# Patient Record
Sex: MALE | Race: WHITE | NOT HISPANIC OR LATINO | Employment: FULL TIME | URBAN - METROPOLITAN AREA
[De-identification: names, ages, dates, MRNs, and addresses within clinical notes are randomized per-mention and may not be internally consistent; named-entity substitution may affect disease eponyms.]

---

## 2017-01-16 NOTE — TELEPHONE ENCOUNTER
Pending Prescriptions:                       Disp   Refills    clonazePAM (KLONOPIN) 0.5 MG tablet       20 tab*0            Sig: Take one half to one tab before bed prn    Last ov was on 08/30/2016, start date 08/30/2016, Dispense #20, Refills 0    [facility]

## 2017-01-17 ENCOUNTER — MYC REFILL (OUTPATIENT)
Dept: SLEEP MEDICINE | Facility: CLINIC | Age: 47
End: 2017-01-17

## 2017-01-18 RX ORDER — CLONAZEPAM 0.5 MG/1
TABLET ORAL
Qty: 20 TABLET | Refills: 0 | OUTPATIENT
Start: 2017-01-18 | End: 2024-08-08

## 2017-01-18 RX ORDER — CLONAZEPAM 0.5 MG/1
TABLET ORAL
Qty: 20 TABLET | Refills: 0 | Status: ON HOLD | OUTPATIENT
Start: 2017-01-18 | End: 2017-11-20

## 2017-01-18 NOTE — TELEPHONE ENCOUNTER
Message from [facility]:  Original authorizing provider: Lynette Antoine MD    Dimitris Carroll would like a refill of the following medications:  clonazePAM (KLONOPIN) 0.5 MG tablet [Lynette Antoine MD]    Preferred pharmacy: [facility] #[id number] - [facility], [address]    Comment:  I am out and should have asked earlier to have this filled. Please send the persription to [facility] in [place]. Is it possible to do this today? Thank you, Lobito [phone]

## 2017-02-06 ENCOUNTER — MYC MEDICAL ADVICE (OUTPATIENT)
Dept: SLEEP MEDICINE | Facility: CLINIC | Age: 47
End: 2017-02-06

## 2017-02-07 ENCOUNTER — MYC MEDICAL ADVICE (OUTPATIENT)
Dept: SLEEP MEDICINE | Facility: CLINIC | Age: 47
End: 2017-02-07

## 2017-02-07 ENCOUNTER — OFFICE VISIT (OUTPATIENT)
Dept: SLEEP MEDICINE | Facility: CLINIC | Age: 47
End: 2017-02-07
Attending: INTERNAL MEDICINE
Payer: COMMERCIAL

## 2017-02-07 VITALS
HEART RATE: 57 BPM | OXYGEN SATURATION: 97 % | WEIGHT: 211.7 LBS | DIASTOLIC BLOOD PRESSURE: 77 MMHG | HEIGHT: 75 IN | SYSTOLIC BLOOD PRESSURE: 115 MMHG | BODY MASS INDEX: 26.32 KG/M2 | RESPIRATION RATE: 16 BRPM

## 2017-02-07 DIAGNOSIS — G47.00 INSOMNIA, UNSPECIFIED TYPE: ICD-10-CM

## 2017-02-07 DIAGNOSIS — G47.33 OSA ON CPAP: Primary | ICD-10-CM

## 2017-02-07 PROCEDURE — 99211 OFF/OP EST MAY X REQ PHY/QHP: CPT | Mod: ZF

## 2017-02-07 RX ORDER — CLONAZEPAM 0.5 MG/1
0.5 TABLET ORAL
Qty: 30 TABLET | Refills: 3 | Status: ON HOLD | OUTPATIENT
Start: 2017-02-07 | End: 2017-11-20

## 2017-02-07 NOTE — NURSING NOTE
"Chief Complaint   Patient presents with     RECHECK     Follow up cpap       Initial /77 mmHg  Pulse 57  Resp 16  Ht 1.905 m (6' 3\")  Wt 96.026 kg (211 lb 11.2 oz)  BMI 26.46 kg/m2  SpO2 97% Estimated body mass index is 26.46 kg/(m^2) as calculated from the following:    Height as of this encounter: 1.905 m (6' 3\").    Weight as of this encounter: 96.026 kg (211 lb 11.2 oz).  Medication Reconciliation: complete     Carli CMA      "

## 2017-02-07 NOTE — PATIENT INSTRUCTIONS

## 2017-02-07 NOTE — MR AVS SNAPSHOT
After Visit Summary   2/7/2017    Dimitris Carroll    MRN: [id number]           Patient Information     Date Of Birth          1970        Visit Information        Provider Department      2/7/2017 9:00 AM Lynette Antoine MD [facility], [place], Sleep Study        Care Instructions      Your BMI is Body mass index is 26.46 kg/(m^2).  Weight management is a personal decision.  If you are interested in exploring weight loss strategies, the following discussion covers the approaches that may be successful. Body mass index (BMI) is one way to tell whether you are at a healthy weight, overweight, or obese. It measures your weight in relation to your height.  A BMI of 18.5 to 24.9 is in the healthy range. A person with a BMI of 25 to 29.9 is considered overweight, and someone with a BMI of 30 or greater is considered obese. More than two-thirds of American adults are considered overweight or obese.  Being overweight or obese increases the risk for further weight gain. Excess weight may lead to heart disease and diabetes.  Creating and following plans for healthy eating and physical activity may help you improve your health.  Weight control is part of healthy lifestyle and includes exercise, emotional health, and healthy eating habits. Careful eating habits lifelong are the mainstay of weight control. Though there are significant health benefits from weight loss, long-term weight loss with diet alone may be very difficult to achieve- studies show long-term success with dietary management in less than 10% of people. Attaining a healthy weight may be especially difficult to achieve in those with severe obesity. In some cases, medications, devices and surgical management might be considered.  What can you do?  If you are overweight or obese and are interested in methods for weight loss, you should discuss this with your provider.     Consider reducing daily calorie intake by 500  calories.     Keep a food journal.     Avoiding skipping meals, consider cutting portions instead.    Diet combined with exercise helps maintain muscle while optimizing fat loss. Strength training is particularly important for building and maintaining muscle mass. Exercise helps reduce stress, increase energy, and improves fitness. Increasing exercise without diet control, however, may not burn enough calories to loose weight.       Start walking three days a week 10-20 minutes at a time    Work towards walking thirty minutes five days a week     Eventually, increase the speed of your walking for 1-2 minutes at time    In addition, we recommend that you review healthy lifestyles and methods for weight loss available through the National Institutes of Health patient information sites:  http://win.niddk.nih.gov/publications/index.htm    And look into health and wellness programs that may be available through your health insurance provider, employer, local community center, or saima club.    Weight management plan: Patient was referred to their PCP to discuss a diet and exercise plan.            Follow-ups after your visit        Who to contact     If you have questions or need follow up information about today's clinic visit or your schedule please contact [facility] directly at [phone].  Normal or non-critical lab and imaging results will be communicated to you by MyChart, letter or phone within 4 business days after the clinic has received the results. If you do not hear from us within 7 days, please contact the clinic through AppLabshart or phone. If you have a critical or abnormal lab result, we will notify you by phone as soon as possible.  Submit refill requests through M87 or call your pharmacy and they will forward the refill request to us. Please allow 3 business days for your refill to be completed.          Additional Information About Your Visit        MyChart Information     OneEyeAntt  "gives you secure access to your electronic health record. If you see a primary care provider, you can also send messages to your care team and make appointments. If you have questions, please call your primary care clinic.  If you do not have a primary care provider, please call [phone] and they will assist you.        Care EveryWhere ID     This is your Care EveryWhere ID. This could be used by other organizations to access your [facility] medical records  [id number]        Your Vitals Were     Pulse Respirations Height BMI (Body Mass Index) Pulse Oximetry       57 16 1.905 m (6' 3\") 26.46 kg/m2 97%        Blood Pressure from Last 3 Encounters:   02/07/17 115/77   08/30/16 113/71   07/18/16 107/59    Weight from Last 3 Encounters:   02/07/17 96.026 kg (211 lb 11.2 oz)   08/30/16 92.987 kg (205 lb)   07/18/16 94.802 kg (209 lb)              Today, you had the following     No orders found for display       Primary Care Provider Office Phone # Fax #    Lenin Bermudez [phone]       [facility] [address]  [place] [address]        Thank you!     Thank you for choosing [facility], [facility]  for your care. Our goal is always to provide you with excellent care. Hearing back from our patients is one way we can continue to improve our services. Please take a few minutes to complete the written survey that you may receive in the mail after your visit with us. Thank you!             Your Updated Medication List - Protect others around you: Learn how to safely use, store and throw away your medicines at www.disposemymeds.org.          This list is accurate as of: 2/7/17  9:50 AM.  Always use your most recent med list.                   Brand Name Dispense Instructions for use    aspirin EC 81 MG EC tablet      Take 1 tablet (81 mg) by mouth daily       * clonazePAM 0.5 MG tablet    klonoPIN    30 tablet    Take one half to one tab before bed       * clonazePAM 0.5 MG " tablet    klonoPIN    20 tablet    Take one half to one tab before bed prn       metoprolol 25 MG 24 hr tablet    TOPROL XL    90 tablet    Take 1 tablet (25 mg) by mouth daily       order for DME      Equipment being ordered: CPAPPatient Dimitris Carroll was set up at [place] on December 17, 2015. Patient received a Resmed AirSense 10 Auto. Pressures were set at Auto 7 - 12 cm H2O.   Patient?s ramp is 5 cm H2O for Auto and FLEX/EPR is A Flex.  Patient received a [employer] Mask name: SIMPLUS FFmask Size Large, heated tubing and heated humidifier.  Patient is enrolled in the STM Program and does need to meet compliance. Patient has a follow up on TBD with Sole Oquendo NP.  Hailey Perdomo       propafenone 300 MG tablet    RYTHMOL    20 tablet    Take 2 tablets (600 mg) by mouth once as needed For recurrence of atrial fibrillation. One dose per 24 hours as needed.       * Notice:  This list has 2 medication(s) that are the same as other medications prescribed for you. Read the directions carefully, and ask your doctor or other care provider to review them with you.

## 2017-02-07 NOTE — PROGRESS NOTES
DICTATED THE SLEEP CLINIC VISIT NOTE FOR TODAY.  Lynette Antoine MD   of Medicine,  Division of Pulmonary/Sleep Medicine  [facility].

## 2017-02-08 NOTE — PROGRESS NOTES
Chief complaint/purpose of visit: F/u MILAGROS and insomnia    HPI: Mr. Carroll is a 46-year-old  male who presents to sleep clinic for f/u of MILAGROS and  insomnia.   He reports using the CPAP device regularly during sleep. There have not been any concerns about snoring with the device. He needs new supplies for his CPAP.  Compliance DL for the past month was reviewed that showed adequate compliance with the device and residual AHI was <5 per hr.  There have not been any recurrent episodes of Atrial fibrillation since he was seen last seen at the sleep clinic.  He denies any concerns about drowsiness while driving.   He has followed up with  Psychologist,   and found the visit was useful.  He reports that he is typically is getting about 6 hours a night currently.  His sleep quality and duration have improved compared to before.  Even though he wakes up approximately 4 hours after falling asleep he is able to fall back asleep and getting couple more hours of sleep. He has work related stress, with the school and some personal stressors. He is worried about his children.   He has been taking Clonazepam 0.5 mg , one tab at bed time which has been helpful. He tolerates the medication well.      Current meds:  Current Outpatient Prescriptions   Medication Sig Dispense Refill     clonazePAM (KLONOPIN) 0.5 MG tablet Take one half to one tab before bed prn 20 tablet 0     clonazePAM (KLONOPIN) 0.5 MG tablet Take one half to one tab before bed 30 tablet 0     propafenone (RYTHMOL) 300 MG tablet Take 2 tablets (600 mg) by mouth once as needed For recurrence of atrial fibrillation. One dose per 24 hours as needed. 20 tablet 0     metoprolol (TOPROL XL) 25 MG 24 hr tablet Take 1 tablet (25 mg) by mouth daily 90 tablet 3     aspirin EC 81 MG tablet Take 1 tablet (81 mg) by mouth daily       order for DME Equipment being ordered: CPAPPatient Dimitris Carroll was set up at [place] on December 17, 2015. Patient received a  "Resmed AirSense 10 Auto. Pressures were set at Auto 7 - 12 cm H2O.   Patient s ramp is 5 cm H2O for Auto and FLEX/EPR is A Flex.  Patient received a [employer] Mask name: SIMPLUS FFmask Size Large, heated tubing and heated humidifier.  Patient is enrolled in the STM Program and does need to meet compliance. Patient has a follow up on TBD with Sole Oquendo NP.   Hailey Perdomo       Past medical history:  Past Medical History   Diagnosis Date     Atrial fibrillation (H)      Sleep apnea      Patient Active Problem List   Diagnosis     Paroxysmal atrial fibrillation (H)     MILAGROS (obstructive sleep apnea)     Insomnia due to psychological stress       Past surgical history:No past surgical history on file.     Allergies:No Known Allergies     Physical Exam:  /77 mmHg  Pulse 57  Resp 16  Ht 1.905 m (6' 3\")  Wt 96.026 kg (211 lb 11.2 oz)  BMI 26.46 kg/m2  SpO2 97%  General appearance:  in no apparent distress  Pt is dressed casually, cooperative with good eye contact.   Speech is spontaneous with regular rate and volume.   Mood: euthymic; affect congruent with full range and intensity.   Sensorium: awake, alert and oriented to person, place, time, and situation.      ASSESSMENT/PLAN:   1. MILAGROS: Pt reports adequate compliance with CPAP and reports positive benefits from continued CPAP usage and based on compliance DL, the MILAGROS is adequately controlled with the CPAP at the current pressure settings. Recommended him to continue using the CPAP regularly during sleep and getting the supplies for the CPAP replaced regularly. He was provided with replacement supplies for his CPAP by our DME provider today.    2. Chronic insomnia. We discussed f/u with Dr. Grisgby and practice relaxation techniques including meditation and yoga, before bed. We also discussed exercise regularly. Prescription refill was provided for Clonazepam which has been helpful in control of anxiety as well as promoting sleep, which he has been " tolerating well.      He was instructed not to drive if drowsy or sleepy and to  pull over if drowsy.    He will f/u at Sleep clinic in one year or sooner if any concerns.    Twenty five minutes spent with patient> 50 % spent counseling and coordinating plan of care.  Lynette Antoine MD   of Medicine,  Division of Pulmonary/Sleep Medicine  [facility].

## 2017-03-28 ENCOUNTER — MYC MEDICAL ADVICE (OUTPATIENT)
Dept: CARDIOLOGY | Facility: CLINIC | Age: 47
End: 2017-03-28

## 2017-03-28 DIAGNOSIS — I48.0 PAROXYSMAL ATRIAL FIBRILLATION (H): ICD-10-CM

## 2017-03-28 RX ORDER — METOPROLOL SUCCINATE 25 MG/1
25 TABLET, EXTENDED RELEASE ORAL DAILY
Qty: 90 TABLET | Refills: 0 | Status: SHIPPED | OUTPATIENT
Start: 2017-03-28 | End: 2017-04-28

## 2017-04-27 ENCOUNTER — MYC MEDICAL ADVICE (OUTPATIENT)
Dept: CARDIOLOGY | Facility: CLINIC | Age: 47
End: 2017-04-27

## 2017-04-27 DIAGNOSIS — I48.0 PAROXYSMAL ATRIAL FIBRILLATION (H): ICD-10-CM

## 2017-04-28 RX ORDER — METOPROLOL SUCCINATE 25 MG/1
25 TABLET, EXTENDED RELEASE ORAL DAILY
Qty: 30 TABLET | Refills: 0 | Status: SHIPPED | OUTPATIENT
Start: 2017-04-28 | End: 2017-05-31

## 2017-04-28 NOTE — TELEPHONE ENCOUNTER
Called and spoke with patient. He has not read past [employer] messages requesting him to see the nurse practitioner for additional refills. Will refill for another month. Scheduling him to see Ami Jones NP on 5/24 at 10:30 for annual follow up and refill authorizations.    Patient verbalized understanding and is in agreement to the plan.

## 2017-05-30 ENCOUNTER — PRE VISIT (OUTPATIENT)
Dept: CARDIOLOGY | Facility: CLINIC | Age: 47
End: 2017-05-30

## 2017-05-30 DIAGNOSIS — I48.0 PAROXYSMAL ATRIAL FIBRILLATION (H): Primary | ICD-10-CM

## 2017-05-31 ENCOUNTER — OFFICE VISIT (OUTPATIENT)
Dept: CARDIOLOGY | Facility: CLINIC | Age: 47
End: 2017-05-31
Attending: NURSE PRACTITIONER
Payer: COMMERCIAL

## 2017-05-31 VITALS
OXYGEN SATURATION: 95 % | WEIGHT: 212.5 LBS | HEART RATE: 63 BPM | DIASTOLIC BLOOD PRESSURE: 68 MMHG | HEIGHT: 75 IN | SYSTOLIC BLOOD PRESSURE: 102 MMHG | BODY MASS INDEX: 26.42 KG/M2

## 2017-05-31 DIAGNOSIS — I48.0 PAROXYSMAL ATRIAL FIBRILLATION (H): ICD-10-CM

## 2017-05-31 PROCEDURE — 99212 OFFICE O/P EST SF 10 MIN: CPT | Mod: ZF

## 2017-05-31 PROCEDURE — 93010 ELECTROCARDIOGRAM REPORT: CPT | Mod: ZP | Performed by: INTERNAL MEDICINE

## 2017-05-31 PROCEDURE — 93005 ELECTROCARDIOGRAM TRACING: CPT | Mod: ZF

## 2017-05-31 PROCEDURE — 99213 OFFICE O/P EST LOW 20 MIN: CPT | Mod: 25 | Performed by: NURSE PRACTITIONER

## 2017-05-31 RX ORDER — METOPROLOL SUCCINATE 25 MG/1
25 TABLET, EXTENDED RELEASE ORAL DAILY
Qty: 30 TABLET | Refills: 11 | Status: SHIPPED | OUTPATIENT
Start: 2017-05-31 | End: 2018-07-13

## 2017-05-31 ASSESSMENT — ENCOUNTER SYMPTOMS
DISTURBANCES IN COORDINATION: 0
TINGLING: 0
HALLUCINATIONS: 0
DECREASED APPETITE: 0
SEIZURES: 0
NIGHT SWEATS: 0
NERVOUS/ANXIOUS: 0
POLYDIPSIA: 0
ALTERED TEMPERATURE REGULATION: 0
DEPRESSION: 0
FATIGUE: 1
WEIGHT LOSS: 0
PANIC: 0
DECREASED CONCENTRATION: 1
FEVER: 0
SPEECH CHANGE: 0
TREMORS: 0
HEADACHES: 0
INCREASED ENERGY: 0
NUMBNESS: 1
MEMORY LOSS: 1
INSOMNIA: 1
DIZZINESS: 0
PARALYSIS: 0
CHILLS: 0
WEAKNESS: 0
WEIGHT GAIN: 0
POLYPHAGIA: 0
LOSS OF CONSCIOUSNESS: 0

## 2017-05-31 ASSESSMENT — PAIN SCALES - GENERAL: PAINLEVEL: NO PAIN (0)

## 2017-05-31 NOTE — LETTER
5/31/2017      RE: Dimitris Carroll  [address] [facility] [address]       Dear Colleague,    Thank you for the opportunity to participate in the care of your patient, Dimitris Carroll, at the [facility]. Please see a copy of my visit note below.    HPI:   Mr. Carroll is a 47 year old male who has a past medical history significant for PAF (CHADSVASC 0) and MILAGROS (uses CPAP). He was first diagnosed with A.fib in October 2015 when he suddenly developed palpitations, dizziness, and dyspnea on 10/10/15. He presented to the urgent care and ultimately was sent to the ER on 10/12/15 where he was diagnosed with atrial fibrillation and underwent DCCV x4. The first 3 shocks (200 J) were followed by return of AF and he was subsequently loaded with 300 mg IV Amiodarone and cardioverted again which resulted in sustained NSR. His initial Echo showed LVEF 40-45%. He was started on Pradaxa, Metoprolol XL 25 mg,and a short course of amiodarone.He was also noted to have elevated TSH He was referred to EP for further management.   EP Visit 11/18/15: presents for further management. His amiodarone was discontinued on 10/28/15. He denied any recurrence of atrial fibrillation. He offers that he snores considerably and has had poor sleep and daytime fatigue. He attributes some of these symptoms to stress at work. He reports increased fatigue since starting metoprolol.He was sent for MILAGROS evaluation, cardiac MRI to evaluate for structural heart disease, and cardiac event monitor.   Ep Visit 1/28/16: He presents today for follow up. His cardiac MRI done in November 2015 showed mild non-ischemic cardiomyopathy with LVEF of 53% and no DE. A stress echo done in December 2015 showed LVEF 55-60%. Cardiac event monitoring showed sinus throughout with rare PACs and PVCs. 9 triggered events all showed NSR some was PACs.  He underwent a sleep study which demonstrated sleep apnea for  which he is now treating with home CPAP.Patient reports feeling well with no further A.fib episodes. Presenting 12 lead ECG shows SB Vent Rate 47 bpm,  ms, QRS 96 ms, QTc 385 ms. Current cardiac medication include: Metoprolol XL and ASA. \    He had ER visit on 5/13/16 for AF with RVR. He was given 600 mg Rythmol and offered DCCV; however, patient declined. He was started on Xarelto with plan for outpatient DCCV if AF did not self convert. He did eventually convert and did not require DCCV.     He presents today for follow. He reports feeling well. He denies any known episodes of AF since 5/13/16. He has since stopped Xarelto given no need for intervention. He denies any chest pain, dizziness, lightheadedness, shortness of breath, palpitations, or syncopal symptoms. Presenting 12 lead ECG shows SB Vent Rate 55 bpm, Pr 156 ms, QRS 86 ms, QTc 397 ms. Current cardiac medications include: Toprol XL, ASA, and Propafenone pill-in-the pocket. He is requesting refill on his Toprol XL.   PAST MEDICAL HISTORY:  Past Medical History:   Diagnosis Date     Atrial fibrillation (H)      Sleep apnea        CURRENT MEDICATIONS:  Current Outpatient Prescriptions   Medication Sig Dispense Refill     metoprolol (TOPROL XL) 25 MG 24 hr tablet Take 1 tablet (25 mg) by mouth daily 30 tablet 11     [DISCONTINUED] metoprolol (TOPROL XL) 25 MG 24 hr tablet Take 1 tablet (25 mg) by mouth daily 30 tablet 0     clonazePAM (KLONOPIN) 0.5 MG tablet Take 1 tablet (0.5 mg) by mouth nightly as needed for anxiety (Patient not taking: Reported on 5/31/2017) 30 tablet 3     clonazePAM (KLONOPIN) 0.5 MG tablet Take one half to one tab before bed prn (Patient not taking: Reported on 5/31/2017) 20 tablet 0     clonazePAM (KLONOPIN) 0.5 MG tablet Take one half to one tab before bed (Patient not taking: Reported on 5/31/2017) 30 tablet 0     propafenone (RYTHMOL) 300 MG tablet Take 2 tablets (600 mg) by mouth once as needed For recurrence of atrial  "fibrillation. One dose per 24 hours as needed. (Patient not taking: Reported on 5/31/2017) 20 tablet 0     aspirin EC 81 MG tablet Take 1 tablet (81 mg) by mouth daily       order for DME Equipment being ordered: CPAPPatient Dimitris Carroll was set up at [place] on December 17, 2015. Patient received a Resmed AirSense 10 Auto. Pressures were set at Auto 7 - 12 cm H2O.   Patient s ramp is 5 cm H2O for Auto and FLEX/EPR is A Flex.  Patient received a [employer] Mask name: SIMPLUS FFmask Size Large, heated tubing and heated humidifier.  Patient is enrolled in the STM Program and does need to meet compliance. Patient has a follow up on TBD with Sole Oquendo NP.   Hailey Perdomo         PAST SURGICAL HISTORY:  No past surgical history on file.    ALLERGIES:   No Known Allergies    FAMILY HISTORY:  No family history on file.    SOCIAL HISTORY:  Social History   Substance Use Topics     Smoking status: Never Smoker     Smokeless tobacco: Not on file     Alcohol use No       ROS:   A comprehensive 10 point review of systems other than as mentioned in HPI.  Exam:  /68 (BP Location: Left arm, Patient Position: Chair, Cuff Size: Adult Large)  Pulse 63  Ht 1.905 m (6' 3\")  Wt 96.4 kg (212 lb 8 oz)  SpO2 95%  BMI 26.56 kg/m2  GENERAL APPEARANCE: healthy, alert and no distress  HEENT: no icterus, no xanthelasmas, normal pupil size and reaction, normal palate, mucosa moist, no central cyanosis  NECK: no adenopathy, no asymmetry, masses, or scars, thyroid normal to palpation and no bruits, JVP not elevated  RESPIRATORY: lungs clear to auscultation - no rales, rhonchi or wheezes, no use of accessory muscles, no retractions, respirations are unlabored, normal respiratory rate  CARDIOVASCULAR: regular rhythm, normal S1 with physiologic split S2, no S3 or S4 and no murmur, click or rub, precordium quiet with normal PMI.  ABDOMEN: soft, non tender, without hepatosplenomegaly, no masses palpable, bowel sounds " normal, aorta not enlarged by palpation, no abdominal bruits  EXTREMITIES: peripheral pulses normal, no edema, no bruits  NEURO: alert and oriented to person/place/time, normal speech, gait and affect  VASC: Radial, femoral, dorsalis pedis and posterior tibialis pulses are normal in volumes and symmetric bilaterally. No bruits are heard.  SKIN: no ecchymoses, no rashes    Labs:  Reviewed.     Procedures:  12/11/15 STRESS ECHOCARDIOGRAM:   Interpretation Summary  There is 1 mm ST segment depression in the Inferior leads.  No stress induced regional wall motion abnormalities. Normal resting LV  function with EF of approximately 60-65%; normal response to exercise with  increase to approximately 70-75%. Normal functional capacity.   11/2015 CARDIAC MRI:   1. The left ventricle is mildly dilated. The wall thickness is normal.  The global systolic function is mildly reduced. The quantitative LV  ejection fraction is 53%. There is mild global hypokinesis.  LV volumes absolute and indexed to BSA with age and gender-matched  normal values in parentheses are listed below:  ED volume: 223 ml (101 - 236 ml)  ED volume index: 104 ml/m2 (52 - 112 ml/m2)  ES volume: 105 ml (28 - 93 ml)  ES volume index: 49 ml/m2  2. The right ventricle is mildly dilated in cavity size with mildly  reduced global systolic function. The quantitative RV ejection  fraction is 51%.  RV volumes absolute and indexed to BSA with age and gender-matched  normal values in parentheses are listed below:  ED volume: 274 ml (110 - 243 ml)  ED volume index: 128 ml/m2 (58 - 115 ml/m2)  ES volume: 134 ml (46 - 112 ml)  ES volume index: 63 ml/m2  3. There is moderate biatrial enlargement.  4. The aortic valve is trileaflet in morphology. There is no  significant aortic valve stenosis or regurgitation.   5. There is no other significant valvular disease.   6. Delayed enhancement imaging demonstrates no evidence of myocardial  infarction, fibrosis or infiltrative  disease. This is consistent with  a non-ischemic cardiomyopathy.  IMPRESSION:  Mild non-ischemic cardiomyopathy with LVEF of 53%.    Assessment and Plan:   Mr. Carroll is a 47 year old male who has a past medical history significant for PAF (CHADSVASC 0) and MILAGROS. He presents today for follow up. He presents today for follow. He reports feeling well. He denies any known episodes of AF since 16. Presenting 12 lead ECG shows SB Vent Rate 55 bpm, Pr 156 ms, QRS 86 ms, QTc 397 ms. Current cardiac medications include: Toprol XL, ASA, and Propafenone pill-in-the pocket. He is requesting refill on his Toprol XL.     We discussed in detail with the patient management/treatment options for A.fib includin. Stroke Prophylaxis:  CHADSVASC=  0, corresponding to a 0.2-0.5% annual stroke / systemic emolism event rate. Indicating no need for long term oral anticoagulation.    2. Rate Control: Continue Toprol XL 25 mg daily.   3. Rhythm Control: Cardioversion, Antiarrhythmics and/or ablation are options for rhythm control. He has not had any further recurrences of A.fib. We would not elect for rhythm control strategy at this time. Can consider addition of AATs for significant recurrences. Ablation would be consider if failure or intolerance of AATs.   4. Risk Factor Management: maintain tight BP control, continued MILAGROS treatment with CPAP.     Follow up in 1 year.  The patient states understanding and is agreeable with plan.   This patient was seen and evaluated with Dr. Stern. The above note reflects our joint assessment and plan.   ARIANE Whitfield CNP  Pager: [phone]      CC  KATHRYN OCASIO

## 2017-05-31 NOTE — MR AVS SNAPSHOT
After Visit Summary   5/31/2017    Dimitris Carroll    MRN: [id number]           Patient Information     Date Of Birth          1970        Visit Information        Provider Department      5/31/2017 10:30 AM Ami Jones APRN [facility]        Today's Diagnoses     Paroxysmal atrial fibrillation (H)          Care Instructions    Cardiology Provider you saw in clinic today: Ami Jones NP    Medication Changes:  We refilled your metoprolol today     Follow-up Visit:  1 year        Please contact us via [employer] for questions or concerns.    Dora Granados RN   Electrophysiology Nurse Coordinator.  [phone]    If your question concerns the schedule/appointment times, contact:  [facility]   [phone]    Device Clinic (Pacemakers, ICD, Loop Recorders)   [phone]      You will receive all normal lab and testing results via [employer] or mail if not reviewed in clinic today.   If you need a medication refill please contact your pharmacy.    As always, thank you for trusting us with your health care needs!            Follow-ups after your visit        Follow-up notes from your care team     Return in about 1 year (around 5/31/2018) for Afib, Ami Jones NP.      Who to contact     If you have questions or need follow up information about today's clinic visit or your schedule please contact [facility] directly at [phone].  Normal or non-critical lab and imaging results will be communicated to you by [employer], letter or phone within 4 business days after the clinic has received the results. If you do not hear from us within 7 days, please contact the clinic through [employer] or phone. If you have a critical or abnormal lab result, we will notify you by phone as soon as possible.  Submit refill requests through [employer] or call your pharmacy and they will forward the refill request to us. Please allow 3 business days for your refill to  "be completed.          Additional Information About Your Visit        [employer] Information     [employer] gives you secure access to your electronic health record. If you see a primary care provider, you can also send messages to your care team and make appointments. If you have questions, please call your primary care clinic.  If you do not have a primary care provider, please call [phone] and they will assist you.        Care EveryWhere ID     This is your Care EveryWhere ID. This could be used by other organizations to access your [place] medical records  [id number]        Your Vitals Were     Pulse Height Pulse Oximetry BMI (Body Mass Index)          63 1.905 m (6' 3\") 95% 26.56 kg/m2         Blood Pressure from Last 3 Encounters:   05/31/17 102/68   02/07/17 115/77   08/30/16 113/71    Weight from Last 3 Encounters:   05/31/17 96.4 kg (212 lb 8 oz)   02/07/17 96 kg (211 lb 11.2 oz)   08/30/16 93 kg (205 lb)              We Performed the Following     EKG 12-lead, tracing only (Future)          Where to get your medicines      These medications were sent to [facility]/pharmacy #[id number] - [place], [place] - [address] [address]     Phone:  [phone]     metoprolol 25 MG 24 hr tablet          Primary Care Provider Office Phone # Fax #    Lenin Bermudez [phone]       [facility] [address]  [facility] [address]        Thank you!     Thank you for choosing [facility]  for your care. Our goal is always to provide you with excellent care. Hearing back from our patients is one way we can continue to improve our services. Please take a few minutes to complete the written survey that you may receive in the mail after your visit with us. Thank you!             Your Updated Medication List - Protect others around you: Learn how to safely use, store and throw away your medicines at www.disposemymeds.org.          This " list is accurate as of: 5/31/17 11:59 PM.  Always use your most recent med list.                   Brand Name Dispense Instructions for use    aspirin EC 81 MG EC tablet      Take 1 tablet (81 mg) by mouth daily       * clonazePAM 0.5 MG tablet    klonoPIN    30 tablet    Take one half to one tab before bed       * clonazePAM 0.5 MG tablet    klonoPIN    20 tablet    Take one half to one tab before bed prn       * clonazePAM 0.5 MG tablet    klonoPIN    30 tablet    Take 1 tablet (0.5 mg) by mouth nightly as needed for anxiety       metoprolol 25 MG 24 hr tablet    TOPROL XL    30 tablet    Take 1 tablet (25 mg) by mouth daily       order for DME      Equipment being ordered: CPAPPatient Dimitris Carroll was set up at [facility] on December 17, 2015. Patient received a [employer] AirSense 10 Auto. Pressures were set at Auto 7 - 12 cm H2O.   Patient?s ramp is 5 cm H2O for Auto and FLEX/EPR is A Flex.  Patient received a [employer] Mask name: SIMPLUS FFmask Size Large, heated tubing and heated humidifier.  Patient is enrolled in the STM Program and does need to meet compliance. Patient has a follow up on TBD with Sole Oquendo NP.  Hailey Perdomo       propafenone 300 MG tablet    RYTHMOL    20 tablet    Take 2 tablets (600 mg) by mouth once as needed For recurrence of atrial fibrillation. One dose per 24 hours as needed.       * Notice:  This list has 3 medication(s) that are the same as other medications prescribed for you. Read the directions carefully, and ask your doctor or other care provider to review them with you.

## 2017-05-31 NOTE — NURSING NOTE
Chief Complaint   Patient presents with     Follow Up For     1.5 yr f/u p. AF. Refill medications. EKG

## 2017-05-31 NOTE — PATIENT INSTRUCTIONS
Cardiology Provider you saw in clinic today: Ami Jones NP    Medication Changes:  We refilled your metoprolol today     Follow-up Visit:  1 year        Please contact us via [employer] for questions or concerns.    Dora Granados RN   Electrophysiology Nurse Coordinator.  [phone]    If your question concerns the schedule/appointment times, contact:  [facility]   [phone]    Device Clinic (Pacemakers, ICD, Loop Recorders)   [phone]      You will receive all normal lab and testing results via [employer] or mail if not reviewed in clinic today.   If you need a medication refill please contact your pharmacy.    As always, thank you for trusting us with your health care needs!

## 2017-05-31 NOTE — PROGRESS NOTES
HPI:   Mr. Carroll is a 47 year old male who has a past medical history significant for PAF (CHADSVASC 0) and MILAGROS (uses CPAP). He was first diagnosed with A.fib in October 2015 when he suddenly developed palpitations, dizziness, and dyspnea on 10/10/15. He presented to the urgent care and ultimately was sent to the ER on 10/12/15 where he was diagnosed with atrial fibrillation and underwent DCCV x4. The first 3 shocks (200 J) were followed by return of AF and he was subsequently loaded with 300 mg IV Amiodarone and cardioverted again which resulted in sustained NSR. His initial Echo showed LVEF 40-45%. He was started on Pradaxa, Metoprolol XL 25 mg,and a short course of amiodarone.He was also noted to have elevated TSH He was referred to EP for further management.   EP Visit 11/18/15: presents for further management. His amiodarone was discontinued on 10/28/15. He denied any recurrence of atrial fibrillation. He offers that he snores considerably and has had poor sleep and daytime fatigue. He attributes some of these symptoms to stress at work. He reports increased fatigue since starting metoprolol.He was sent for MILAGROS evaluation, cardiac MRI to evaluate for structural heart disease, and cardiac event monitor.   Ep Visit 1/28/16: He presents today for follow up. His cardiac MRI done in November 2015 showed mild non-ischemic cardiomyopathy with LVEF of 53% and no DE. A stress echo done in December 2015 showed LVEF 55-60%. Cardiac event monitoring showed sinus throughout with rare PACs and PVCs. 9 triggered events all showed NSR some was PACs.  He underwent a sleep study which demonstrated sleep apnea for which he is now treating with home CPAP.Patient reports feeling well with no further A.fib episodes. Presenting 12 lead ECG shows SB Vent Rate 47 bpm,  ms, QRS 96 ms, QTc 385 ms. Current cardiac medication include: Metoprolol XL and ASA. \    He had ER visit on 5/13/16 for AF with RVR. He was given 600 mg  Rythmol and offered DCCV; however, patient declined. He was started on Xarelto with plan for outpatient DCCV if AF did not self convert. He did eventually convert and did not require DCCV.     He presents today for follow. He reports feeling well. He denies any known episodes of AF since 5/13/16. He has since stopped Xarelto given no need for intervention. He denies any chest pain, dizziness, lightheadedness, shortness of breath, palpitations, or syncopal symptoms. Presenting 12 lead ECG shows SB Vent Rate 55 bpm, Pr 156 ms, QRS 86 ms, QTc 397 ms. Current cardiac medications include: Toprol XL, ASA, and Propafenone pill-in-the pocket. He is requesting refill on his Toprol XL.   PAST MEDICAL HISTORY:  Past Medical History:   Diagnosis Date     Atrial fibrillation (H)      Sleep apnea        CURRENT MEDICATIONS:  Current Outpatient Prescriptions   Medication Sig Dispense Refill     metoprolol (TOPROL XL) 25 MG 24 hr tablet Take 1 tablet (25 mg) by mouth daily 30 tablet 11     [DISCONTINUED] metoprolol (TOPROL XL) 25 MG 24 hr tablet Take 1 tablet (25 mg) by mouth daily 30 tablet 0     clonazePAM (KLONOPIN) 0.5 MG tablet Take 1 tablet (0.5 mg) by mouth nightly as needed for anxiety (Patient not taking: Reported on 5/31/2017) 30 tablet 3     clonazePAM (KLONOPIN) 0.5 MG tablet Take one half to one tab before bed prn (Patient not taking: Reported on 5/31/2017) 20 tablet 0     clonazePAM (KLONOPIN) 0.5 MG tablet Take one half to one tab before bed (Patient not taking: Reported on 5/31/2017) 30 tablet 0     propafenone (RYTHMOL) 300 MG tablet Take 2 tablets (600 mg) by mouth once as needed For recurrence of atrial fibrillation. One dose per 24 hours as needed. (Patient not taking: Reported on 5/31/2017) 20 tablet 0     aspirin EC 81 MG tablet Take 1 tablet (81 mg) by mouth daily       order for DME Equipment being ordered: CPAPPatient Dimitris Carroll was set up at [facility] on December 17, 2015. Patient received a  "[employer] AirSense 10 Auto. Pressures were set at Auto 7 - 12 cm H2O.   Patient s ramp is 5 cm H2O for Auto and FLEX/EPR is A Flex.  Patient received a [employer] Mask name: SIMPLUS FFmask Size Large, heated tubing and heated humidifier.  Patient is enrolled in the STM Program and does need to meet compliance. Patient has a follow up on TBD with Sole Oquendo NP.   Hailey Perdomo         PAST SURGICAL HISTORY:  No past surgical history on file.    ALLERGIES:   No Known Allergies    FAMILY HISTORY:  No family history on file.    SOCIAL HISTORY:  Social History   Substance Use Topics     Smoking status: Never Smoker     Smokeless tobacco: Not on file     Alcohol use No       ROS:   A comprehensive 10 point review of systems other than as mentioned in HPI.  Exam:  /68 (BP Location: Left arm, Patient Position: Chair, Cuff Size: Adult Large)  Pulse 63  Ht 1.905 m (6' 3\")  Wt 96.4 kg (212 lb 8 oz)  SpO2 95%  BMI 26.56 kg/m2  GENERAL APPEARANCE: healthy, alert and no distress  HEENT: no icterus, no xanthelasmas, normal pupil size and reaction, normal palate, mucosa moist, no central cyanosis  NECK: no adenopathy, no asymmetry, masses, or scars, thyroid normal to palpation and no bruits, JVP not elevated  RESPIRATORY: lungs clear to auscultation - no rales, rhonchi or wheezes, no use of accessory muscles, no retractions, respirations are unlabored, normal respiratory rate  CARDIOVASCULAR: regular rhythm, normal S1 with physiologic split S2, no S3 or S4 and no murmur, click or rub, precordium quiet with normal PMI.  ABDOMEN: soft, non tender, without hepatosplenomegaly, no masses palpable, bowel sounds normal, aorta not enlarged by palpation, no abdominal bruits  EXTREMITIES: peripheral pulses normal, no edema, no bruits  NEURO: alert and oriented to person/place/time, normal speech, gait and affect  VASC: Radial, femoral, dorsalis pedis and posterior tibialis pulses are normal in volumes and symmetric " bilaterally. No bruits are heard.  SKIN: no ecchymoses, no rashes    Labs:  Reviewed.     Procedures:  12/11/15 STRESS ECHOCARDIOGRAM:   Interpretation Summary  There is 1 mm ST segment depression in the Inferior leads.  No stress induced regional wall motion abnormalities. Normal resting LV  function with EF of approximately 60-65%; normal response to exercise with  increase to approximately 70-75%. Normal functional capacity.   11/2015 CARDIAC MRI:   1. The left ventricle is mildly dilated. The wall thickness is normal.  The global systolic function is mildly reduced. The quantitative LV  ejection fraction is 53%. There is mild global hypokinesis.  LV volumes absolute and indexed to BSA with age and gender-matched  normal values in parentheses are listed below:  ED volume: 223 ml (101 - 236 ml)  ED volume index: 104 ml/m2 (52 - 112 ml/m2)  ES volume: 105 ml (28 - 93 ml)  ES volume index: 49 ml/m2  2. The right ventricle is mildly dilated in cavity size with mildly  reduced global systolic function. The quantitative RV ejection  fraction is 51%.  RV volumes absolute and indexed to BSA with age and gender-matched  normal values in parentheses are listed below:  ED volume: 274 ml (110 - 243 ml)  ED volume index: 128 ml/m2 (58 - 115 ml/m2)  ES volume: 134 ml (46 - 112 ml)  ES volume index: 63 ml/m2  3. There is moderate biatrial enlargement.  4. The aortic valve is trileaflet in morphology. There is no  significant aortic valve stenosis or regurgitation.   5. There is no other significant valvular disease.   6. Delayed enhancement imaging demonstrates no evidence of myocardial  infarction, fibrosis or infiltrative disease. This is consistent with  a non-ischemic cardiomyopathy.  IMPRESSION:  Mild non-ischemic cardiomyopathy with LVEF of 53%.    Assessment and Plan:   Mr. Carroll is a 47 year old male who has a past medical history significant for PAF (CHADSVASC 0) and MILAGROS. He presents today for follow up. He presents  today for follow. He reports feeling well. He denies any known episodes of AF since 16. Presenting 12 lead ECG shows SB Vent Rate 55 bpm, Pr 156 ms, QRS 86 ms, QTc 397 ms. Current cardiac medications include: Toprol XL, ASA, and Propafenone pill-in-the pocket. He is requesting refill on his Toprol XL.     We discussed in detail with the patient management/treatment options for A.fib includin. Stroke Prophylaxis:  CHADSVASC=  0, corresponding to a 0.2-0.5% annual stroke / systemic emolism event rate. Indicating no need for long term oral anticoagulation.    2. Rate Control: Continue Toprol XL 25 mg daily.   3. Rhythm Control: Cardioversion, Antiarrhythmics and/or ablation are options for rhythm control. He has not had any further recurrences of A.fib. We would not elect for rhythm control strategy at this time. Can consider addition of AATs for significant recurrences. Ablation would be consider if failure or intolerance of AATs.   4. Risk Factor Management: maintain tight BP control, continued MILAGROS treatment with CPAP.     Follow up in 1 year.  The patient states understanding and is agreeable with plan.   This patient was seen and evaluated with Dr. Stern. The above note reflects our joint assessment and plan.   ARIANE Whitfield CNP  Pager: [phone]      CC  KATHRYN OCASIO

## 2017-06-01 LAB — INTERPRETATION ECG - MUSE: NORMAL

## 2017-11-19 ENCOUNTER — HOSPITAL ENCOUNTER (INPATIENT)
Facility: CLINIC | Age: 47
LOS: 1 days | Discharge: HOME | End: 2017-11-20
Attending: EMERGENCY MEDICINE | Admitting: INTERNAL MEDICINE
Payer: COMMERCIAL

## 2017-11-19 DIAGNOSIS — I48.0 PAROXYSMAL ATRIAL FIBRILLATION (H): ICD-10-CM

## 2017-11-19 DIAGNOSIS — I48.91 A-FIB (H): ICD-10-CM

## 2017-11-19 LAB
ANION GAP SERPL CALCULATED.3IONS-SCNC: 8 MMOL/L (ref 3–14)
BASOPHILS # BLD AUTO: 0 10E9/L (ref 0–0.2)
BASOPHILS NFR BLD AUTO: 0.3 %
BUN SERPL-MCNC: 22 MG/DL (ref 7–30)
CALCIUM SERPL-MCNC: 8.3 MG/DL (ref 8.5–10.1)
CHLORIDE SERPL-SCNC: 109 MMOL/L (ref 94–109)
CO2 SERPL-SCNC: 26 MMOL/L (ref 20–32)
CREAT SERPL-MCNC: 1.1 MG/DL (ref 0.66–1.25)
DIFFERENTIAL METHOD BLD: NORMAL
EOSINOPHIL # BLD AUTO: 0.2 10E9/L (ref 0–0.7)
EOSINOPHIL NFR BLD AUTO: 3 %
ERYTHROCYTE [DISTWIDTH] IN BLOOD BY AUTOMATED COUNT: 12.7 % (ref 10–15)
GFR SERPL CREATININE-BSD FRML MDRD: 71 ML/MIN/1.7M2
GLUCOSE SERPL-MCNC: 145 MG/DL (ref 70–99)
HCT VFR BLD AUTO: 46.1 % (ref 40–53)
HGB BLD-MCNC: 15.7 G/DL (ref 13.3–17.7)
IMM GRANULOCYTES # BLD: 0 10E9/L (ref 0–0.4)
IMM GRANULOCYTES NFR BLD: 0.1 %
LYMPHOCYTES # BLD AUTO: 2.9 10E9/L (ref 0.8–5.3)
LYMPHOCYTES NFR BLD AUTO: 36.8 %
MAGNESIUM SERPL-MCNC: 2.1 MG/DL (ref 1.6–2.3)
MCH RBC QN AUTO: 31.6 PG (ref 26.5–33)
MCHC RBC AUTO-ENTMCNC: 34.1 G/DL (ref 31.5–36.5)
MCV RBC AUTO: 93 FL (ref 78–100)
MONOCYTES # BLD AUTO: 0.6 10E9/L (ref 0–1.3)
MONOCYTES NFR BLD AUTO: 7.1 %
NEUTROPHILS # BLD AUTO: 4.1 10E9/L (ref 1.6–8.3)
NEUTROPHILS NFR BLD AUTO: 52.7 %
NRBC # BLD AUTO: 0 10*3/UL
NRBC BLD AUTO-RTO: 0 /100
PLATELET # BLD AUTO: 266 10E9/L (ref 150–450)
POTASSIUM SERPL-SCNC: 3.9 MMOL/L (ref 3.4–5.3)
RBC # BLD AUTO: 4.97 10E12/L (ref 4.4–5.9)
SODIUM SERPL-SCNC: 143 MMOL/L (ref 133–144)
WBC # BLD AUTO: 7.8 10E9/L (ref 4–11)

## 2017-11-19 PROCEDURE — 93005 ELECTROCARDIOGRAM TRACING: CPT | Performed by: EMERGENCY MEDICINE

## 2017-11-19 PROCEDURE — 99223 1ST HOSP IP/OBS HIGH 75: CPT | Mod: GC | Performed by: INTERNAL MEDICINE

## 2017-11-19 PROCEDURE — 80048 BASIC METABOLIC PNL TOTAL CA: CPT | Performed by: EMERGENCY MEDICINE

## 2017-11-19 PROCEDURE — 93010 ELECTROCARDIOGRAM REPORT: CPT | Mod: Z6 | Performed by: EMERGENCY MEDICINE

## 2017-11-19 PROCEDURE — 83735 ASSAY OF MAGNESIUM: CPT | Performed by: EMERGENCY MEDICINE

## 2017-11-19 PROCEDURE — 85025 COMPLETE CBC W/AUTO DIFF WBC: CPT | Performed by: EMERGENCY MEDICINE

## 2017-11-19 PROCEDURE — 99285 EMERGENCY DEPT VISIT HI MDM: CPT | Mod: 25 | Performed by: EMERGENCY MEDICINE

## 2017-11-19 PROCEDURE — 99285 EMERGENCY DEPT VISIT HI MDM: CPT | Performed by: EMERGENCY MEDICINE

## 2017-11-19 ASSESSMENT — ENCOUNTER SYMPTOMS
PALPITATIONS: 1
SHORTNESS OF BREATH: 1

## 2017-11-19 NOTE — LETTER
Transition Communication Hand-off for Care Transitions to Next Level of Care Provider    Name: Dimitris Carroll  MRN #: [id number]  Primary Care Provider: Lenin Bermudez     Primary Clinic: [address] [address]     Reason for Hospitalization:  A-fib (H) [I48.91]  Admit Date/Time: 11/19/2017  9:13 PM  Discharge Date: 11/20/17  Payor Source: Payor: BCBS / Plan: [employer]BS [place] / Product Type: Indemnity   Readmission Assessment Measure (MELISSA) Risk Score/category: low  Reason for Communication Hand-off Referral: Multiple providers/specialties  Discharge Plan:    New Xarelto. S/P successful direct current cardioversion with 150J biphasic shock  Concern for non-adherence with plan of care: no  Discharge Needs Assessment:  Follow-up specialty is recommended: Yes             Key Recommendations:  Post hospitalization follow up.     BETO ROB RN CC

## 2017-11-19 NOTE — IP AVS SNAPSHOT
MRN:[id number]                      After Visit Summary   11/19/2017    Dimitris Carroll    MRN: [id number]           Thank you!     Thank you for choosing [facility] for your care. Our goal is always to provide you with excellent care. Hearing back from our patients is one way we can continue to improve our services. Please take a few minutes to complete the written survey that you may receive in the mail after you visit with us. Thank you!        Patient Information     Date Of Birth          1970        Designated Caregiver       Most Recent Value    Caregiver    Will someone help with your care after discharge? no      About your hospital stay     You were admitted on:  November 20, 2017 You last received care in the:  [address] [facility]    You were discharged on:  November 20, 2017        Reason for your hospital stay       You were hospitalized due to your atrial fibrillation. Since you are so uncomfortable the abnormal rhythm you received a cardioversion (a shock) to restore your hearts normal rhythm. Since you had been in the abnormal rhythm for some time you received a trans-esophageal echo (RYDER) to ensure you did not have any clots in your heart prior to the shock. Everything on the RYDER looked fine and you responded very well to the cardioversion. You should continue to take anticoagulation for appoximately least two months and follow-up with electrophysiology in a month to discuss on-going plans for this arrhythmia.                  Who to Call     For medical emergencies, please call 911.  For non-urgent questions about your medical care, please call your primary care provider or clinic, [phone]  For questions related to your surgery, please call your surgery clinic        Attending Provider     Provider Specialty    Loc Ridley MD Emergency Medicine    [facility]       Primary Care Provider Office Phone # Fax #    Lenin Bermudez,  [phone]       When to contact your care team       If you develop recurrent symptoms of A fib, or have any significant trauma while on warfarin (particularly any head trauma).                  After Care Instructions     Activity       Your activity upon discharge: activity as tolerated            Diet       Follow this diet upon discharge: Orders Placed This Encounter      Regular Diet Adult            Discharge Instructions       Please continue to take the xarelto (blood-thinner) for two months. You will follow-up with electrophysiology in approximately one month.                  Follow-up Appointments     Adult [facility] Follow-up and recommended labs and tests       Follow-up with cardiology (electrophysiology).     Appointments on [place] and/or [facility] (with [facility] or [facility] provider or service). Call [phone] if you haven't heard regarding these appointments within 7 days of discharge.                  Pending Results     Date and Time Order Name Status Description    11/20/2017 1309 EKG 12-lead, complete Preliminary     11/20/2017 0949 EKG 12-lead, tracing only Preliminary     11/20/2017 0941 Cardioversion In process             Statement of Approval     Ordered          11/20/17 1044  I have reviewed and agree with all the recommendations and orders detailed in this document.  EFFECTIVE NOW     Approved and electronically signed by:  Siddharth Baca MD             Admission Information     Date & Time Provider Department Dept. Phone    11/19/2017 Siddharth Baca MD [address] [facility] [phone]      Your Vitals Were     Blood Pressure Pulse Temperature Respirations Height Weight    109/76 68 97.3  F (36.3  C) (Oral) 18 1.905 m (6' 3\") 97.2 kg (214 lb 4.8 oz)    Pulse Oximetry BMI (Body Mass Index)                96% 26.79 kg/m2          MyChart Information     [email or web address] gives you secure access to your electronic health record. If you see a primary care " provider, you can also send messages to your care team and make appointments. If you have questions, please call your primary care clinic.  If you do not have a primary care provider, please call [phone] and they will assist you.        Care EveryWhere ID     This is your Care EveryWhere ID. This could be used by other organizations to access your [facility] medical records  [id number]        Equal Access to Services     ROLANDO FRAZIER : Hadii aad ku hadasho Soomaali, waaxda luqadaha, qaybta kaalmada adeegyada, waxay idiin hayaan adeeg drew laariana . So [facility] [phone].    ATENCIÓN: Si habla español, tiene a meza disposición servicios gratuitos de asistencia lingüística. Patrick al [phone].    We comply with applicable federal civil rights laws and [place] laws. We do not discriminate on the basis of race, color, national origin, age, disability, sex, sexual orientation, or gender identity.               Review of your medicines      START taking        Dose / Directions    rivaroxaban ANTICOAGULANT 20 MG Tabs tablet   Commonly known as:  XARELTO   Used for:  Paroxysmal atrial fibrillation (H)        Dose:  20 mg   Take 1 tablet (20 mg) by mouth daily (with dinner)   Quantity:  60 tablet   Refills:  0         CONTINUE these medicines which have NOT CHANGED        Dose / Directions    metoprolol 25 MG 24 hr tablet   Commonly known as:  TOPROL XL   Used for:  Paroxysmal atrial fibrillation (H)        Dose:  25 mg   Take 1 tablet (25 mg) by mouth daily   Quantity:  30 tablet   Refills:  11       order for DME        Equipment being ordered: CPAPPatient Dimitris Carroll was set up at [facility] on December 17, 2015. Patient received a Resmed AirSense 10 Auto. Pressures were set at Auto 7 - 12 cm H2O.   Patient?s ramp is 5 cm H2O for Auto and FLEX/EPR is A Flex.  Patient received a [employer] Mask name: SIMPLUS FFmask Size Large, heated tubing and heated humidifier.  Patient is enrolled in the [facility]  Program and does need to meet compliance. Patient has a follow up on TBD with Sole Oquendo NP.  [facility] Perdomo   Refills:  0       propafenone 300 MG tablet   Commonly known as:  RYTHMOL   Used for:  Paroxysmal atrial fibrillation (H)        Dose:  600 mg   Take 2 tablets (600 mg) by mouth once as needed For recurrence of atrial fibrillation. One dose per 24 hours as needed.   Quantity:  20 tablet   Refills:  0         STOP taking     AMIODARONE HCL PO           aspirin EC 81 MG EC tablet           clonazePAM 0.5 MG tablet   Commonly known as:  klonoPIN                Where to get your medicines      These medications [id number] [place]  [address] [facility]  [address] [place] [address]     Phone:  [phone]     rivaroxaban ANTICOAGULANT 20 MG Tabs tablet                Protect others around you: Learn how to safely use, store and throw away your medicines at www.disposemymeds.org.             Medication List: This is a list of all your medications and when to take them. Check marks below indicate your daily home schedule. Keep this list as a reference.      Medications           Morning Afternoon Evening Bedtime As Needed    metoprolol 25 MG 24 hr tablet   Commonly known as:  TOPROL XL   Take 1 tablet (25 mg) by mouth daily   Last time this was given:  25 mg on 11/20/2017  7:55 AM                                order for DME   Equipment being ordered: CPAPPatient Dimitris Carroll was set up at [facility] on December 17, 2015. Patient received a Resmed AirSense 10 Auto. Pressures were set at Auto 7 - 12 cm H2O.   Patient?s ramp is 5 cm H2O for Auto and FLEX/EPR is A Flex.  Patient received a [employer] Mask name: SIMPLUS FFmask Size Large, heated tubing and heated humidifier.  Patient is enrolled in the [facility] Program and does need to meet compliance. Patient has a follow up on TBD with Sole Oquendo NP.  [facility] Perdomo                                 propafenone 300 MG tablet   Commonly known as:  RYTHMOL   Take 2 tablets (600 mg) by mouth once as needed For recurrence of atrial fibrillation. One dose per 24 hours as needed.                                rivaroxaban ANTICOAGULANT 20 MG Tabs tablet   Commonly known as:  XARELTO   Take 1 tablet (20 mg) by mouth daily (with dinner)   Last time this was given:  20 mg on 11/20/2017 11:27 AM

## 2017-11-19 NOTE — IP AVS SNAPSHOT
[address] [address]    Phone:  [phone]                                       After Visit Summary   11/19/2017    Dimitris Carroll    MRN: [id number]           After Visit Summary Signature Page     I have received my discharge instructions, and my questions have been answered. I have discussed any challenges I see with this plan with the nurse or doctor.    ..........................................................................................................................................  Patient/Patient Representative Signature      ..........................................................................................................................................  Patient Representative Print Name and Relationship to Patient    ..................................................               ................................................  Date                                            Time    ..........................................................................................................................................  Reviewed by Signature/Title    ...................................................              ..............................................  Date                                                            Time

## 2017-11-20 ENCOUNTER — APPOINTMENT (OUTPATIENT)
Dept: CARDIOLOGY | Facility: CLINIC | Age: 47
End: 2017-11-20
Attending: NURSE PRACTITIONER
Payer: COMMERCIAL

## 2017-11-20 ENCOUNTER — APPOINTMENT (OUTPATIENT)
Dept: CARDIOLOGY | Facility: CLINIC | Age: 47
End: 2017-11-20
Payer: COMMERCIAL

## 2017-11-20 ENCOUNTER — ANESTHESIA (OUTPATIENT)
Dept: SURGERY | Facility: CLINIC | Age: 47
End: 2017-11-20
Payer: COMMERCIAL

## 2017-11-20 ENCOUNTER — ANESTHESIA EVENT (OUTPATIENT)
Dept: SURGERY | Facility: CLINIC | Age: 47
End: 2017-11-20
Payer: COMMERCIAL

## 2017-11-20 VITALS
DIASTOLIC BLOOD PRESSURE: 76 MMHG | TEMPERATURE: 97.3 F | HEIGHT: 75 IN | HEART RATE: 68 BPM | WEIGHT: 214.3 LBS | BODY MASS INDEX: 26.64 KG/M2 | SYSTOLIC BLOOD PRESSURE: 109 MMHG | OXYGEN SATURATION: 96 % | RESPIRATION RATE: 18 BRPM

## 2017-11-20 PROBLEM — I48.91 ATRIAL FIBRILLATION (H): Status: ACTIVE | Noted: 2017-11-20

## 2017-11-20 LAB
INR PPP: 0.96 (ref 0.86–1.14)
INTERPRETATION ECG - MUSE: NORMAL
MAGNESIUM SERPL-MCNC: 2.1 MG/DL (ref 1.6–2.3)
POTASSIUM SERPL-SCNC: 4.3 MMOL/L (ref 3.4–5.3)

## 2017-11-20 PROCEDURE — 93325 DOPPLER ECHO COLOR FLOW MAPG: CPT | Mod: 26 | Performed by: INTERNAL MEDICINE

## 2017-11-20 PROCEDURE — 92960 CARDIOVERSION ELECTRIC EXT: CPT | Mod: 59 | Performed by: NURSE PRACTITIONER

## 2017-11-20 PROCEDURE — 93325 DOPPLER ECHO COLOR FLOW MAPG: CPT

## 2017-11-20 PROCEDURE — 25000125 ZZHC RX 250: Performed by: INTERNAL MEDICINE

## 2017-11-20 PROCEDURE — 92960 CARDIOVERSION ELECTRIC EXT: CPT

## 2017-11-20 PROCEDURE — 83735 ASSAY OF MAGNESIUM: CPT | Performed by: INTERNAL MEDICINE

## 2017-11-20 PROCEDURE — 84132 ASSAY OF SERUM POTASSIUM: CPT | Performed by: INTERNAL MEDICINE

## 2017-11-20 PROCEDURE — 36415 COLL VENOUS BLD VENIPUNCTURE: CPT | Performed by: INTERNAL MEDICINE

## 2017-11-20 PROCEDURE — 93312 ECHO TRANSESOPHAGEAL: CPT | Mod: 26 | Performed by: INTERNAL MEDICINE

## 2017-11-20 PROCEDURE — 5A2204Z RESTORATION OF CARDIAC RHYTHM, SINGLE: ICD-10-PCS | Performed by: NURSE PRACTITIONER

## 2017-11-20 PROCEDURE — 25000128 H RX IP 250 OP 636: Performed by: INTERNAL MEDICINE

## 2017-11-20 PROCEDURE — 25000125 ZZHC RX 250: Performed by: NURSE ANESTHETIST, CERTIFIED REGISTERED

## 2017-11-20 PROCEDURE — 21400006 ZZH R&B CCU INTERMEDIATE UMMC

## 2017-11-20 PROCEDURE — 99222 1ST HOSP IP/OBS MODERATE 55: CPT | Performed by: INTERNAL MEDICINE

## 2017-11-20 PROCEDURE — 25000132 ZZH RX MED GY IP 250 OP 250 PS 637: Performed by: INTERNAL MEDICINE

## 2017-11-20 PROCEDURE — 37000008 ZZH ANESTHESIA TECHNICAL FEE, 1ST 30 MIN

## 2017-11-20 PROCEDURE — 93005 ELECTROCARDIOGRAM TRACING: CPT

## 2017-11-20 PROCEDURE — 93320 DOPPLER ECHO COMPLETE: CPT | Mod: 26 | Performed by: INTERNAL MEDICINE

## 2017-11-20 PROCEDURE — 93010 ELECTROCARDIOGRAM REPORT: CPT | Mod: 76 | Performed by: INTERNAL MEDICINE

## 2017-11-20 PROCEDURE — 85610 PROTHROMBIN TIME: CPT | Performed by: INTERNAL MEDICINE

## 2017-11-20 PROCEDURE — 99238 HOSP IP/OBS DSCHRG MGMT 30/<: CPT | Mod: 25 | Performed by: INTERNAL MEDICINE

## 2017-11-20 PROCEDURE — 99152 MOD SED SAME PHYS/QHP 5/>YRS: CPT

## 2017-11-20 RX ORDER — PROPAFENONE HYDROCHLORIDE 300 MG/1
600 TABLET, COATED ORAL
Qty: 20 TABLET | Refills: 0 | Status: SHIPPED | OUTPATIENT
Start: 2017-11-20 | End: 2021-01-05

## 2017-11-20 RX ORDER — LIDOCAINE 40 MG/G
CREAM TOPICAL
Status: DISCONTINUED | OUTPATIENT
Start: 2017-11-20 | End: 2017-11-20 | Stop reason: HOSPADM

## 2017-11-20 RX ORDER — SODIUM CHLORIDE 9 MG/ML
INJECTION, SOLUTION INTRAVENOUS CONTINUOUS PRN
Status: DISCONTINUED | OUTPATIENT
Start: 2017-11-20 | End: 2017-11-20 | Stop reason: HOSPADM

## 2017-11-20 RX ORDER — POTASSIUM CHLORIDE 750 MG/1
20-40 TABLET, EXTENDED RELEASE ORAL
Status: DISCONTINUED | OUTPATIENT
Start: 2017-11-20 | End: 2017-11-20 | Stop reason: HOSPADM

## 2017-11-20 RX ORDER — ASPIRIN 81 MG/1
81 TABLET ORAL DAILY
Status: DISCONTINUED | OUTPATIENT
Start: 2017-11-20 | End: 2017-11-20 | Stop reason: HOSPADM

## 2017-11-20 RX ORDER — POTASSIUM CHLORIDE 7.45 MG/ML
10 INJECTION INTRAVENOUS
Status: DISCONTINUED | OUTPATIENT
Start: 2017-11-20 | End: 2017-11-20 | Stop reason: HOSPADM

## 2017-11-20 RX ORDER — POTASSIUM CHLORIDE 750 MG/1
40 TABLET, EXTENDED RELEASE ORAL
Status: DISCONTINUED | OUTPATIENT
Start: 2017-11-20 | End: 2017-11-20 | Stop reason: HOSPADM

## 2017-11-20 RX ORDER — FLUMAZENIL 0.1 MG/ML
0.2 INJECTION, SOLUTION INTRAVENOUS
Status: DISCONTINUED | OUTPATIENT
Start: 2017-11-20 | End: 2017-11-20 | Stop reason: HOSPADM

## 2017-11-20 RX ORDER — ACETAMINOPHEN 325 MG/1
650 TABLET ORAL EVERY 4 HOURS PRN
Status: DISCONTINUED | OUTPATIENT
Start: 2017-11-20 | End: 2017-11-20 | Stop reason: HOSPADM

## 2017-11-20 RX ORDER — POTASSIUM CHLORIDE 750 MG/1
20 TABLET, EXTENDED RELEASE ORAL
Status: DISCONTINUED | OUTPATIENT
Start: 2017-11-20 | End: 2017-11-20 | Stop reason: HOSPADM

## 2017-11-20 RX ORDER — ACETAMINOPHEN 650 MG/1
650 SUPPOSITORY RECTAL EVERY 4 HOURS PRN
Status: DISCONTINUED | OUTPATIENT
Start: 2017-11-20 | End: 2017-11-20 | Stop reason: HOSPADM

## 2017-11-20 RX ORDER — POTASSIUM CHLORIDE 1.5 G/1.58G
20-40 POWDER, FOR SOLUTION ORAL
Status: DISCONTINUED | OUTPATIENT
Start: 2017-11-20 | End: 2017-11-20 | Stop reason: HOSPADM

## 2017-11-20 RX ORDER — ALUMINA, MAGNESIA, AND SIMETHICONE 2400; 2400; 240 MG/30ML; MG/30ML; MG/30ML
30 SUSPENSION ORAL EVERY 4 HOURS PRN
Status: DISCONTINUED | OUTPATIENT
Start: 2017-11-20 | End: 2017-11-20 | Stop reason: HOSPADM

## 2017-11-20 RX ORDER — METOPROLOL SUCCINATE 25 MG/1
25 TABLET, EXTENDED RELEASE ORAL DAILY
Status: DISCONTINUED | OUTPATIENT
Start: 2017-11-20 | End: 2017-11-20 | Stop reason: HOSPADM

## 2017-11-20 RX ORDER — DOCUSATE SODIUM 100 MG/1
100 CAPSULE, LIQUID FILLED ORAL 2 TIMES DAILY
Status: DISCONTINUED | OUTPATIENT
Start: 2017-11-20 | End: 2017-11-20 | Stop reason: HOSPADM

## 2017-11-20 RX ORDER — LANOLIN ALCOHOL/MO/W.PET/CERES
3-6 CREAM (GRAM) TOPICAL
Status: DISCONTINUED | OUTPATIENT
Start: 2017-11-20 | End: 2017-11-20 | Stop reason: HOSPADM

## 2017-11-20 RX ORDER — MAGNESIUM SULFATE HEPTAHYDRATE 40 MG/ML
4 INJECTION, SOLUTION INTRAVENOUS EVERY 4 HOURS PRN
Status: DISCONTINUED | OUTPATIENT
Start: 2017-11-20 | End: 2017-11-20 | Stop reason: HOSPADM

## 2017-11-20 RX ORDER — POTASSIUM CHLORIDE 29.8 MG/ML
20 INJECTION INTRAVENOUS
Status: DISCONTINUED | OUTPATIENT
Start: 2017-11-20 | End: 2017-11-20 | Stop reason: HOSPADM

## 2017-11-20 RX ORDER — FENTANYL CITRATE 50 UG/ML
25-50 INJECTION, SOLUTION INTRAMUSCULAR; INTRAVENOUS
Status: DISCONTINUED | OUTPATIENT
Start: 2017-11-20 | End: 2017-11-20 | Stop reason: HOSPADM

## 2017-11-20 RX ORDER — NALOXONE HYDROCHLORIDE 0.4 MG/ML
.1-.4 INJECTION, SOLUTION INTRAMUSCULAR; INTRAVENOUS; SUBCUTANEOUS
Status: DISCONTINUED | OUTPATIENT
Start: 2017-11-20 | End: 2017-11-20 | Stop reason: HOSPADM

## 2017-11-20 RX ORDER — POTASSIUM CL/LIDO/0.9 % NACL 10MEQ/0.1L
10 INTRAVENOUS SOLUTION, PIGGYBACK (ML) INTRAVENOUS
Status: DISCONTINUED | OUTPATIENT
Start: 2017-11-20 | End: 2017-11-20 | Stop reason: HOSPADM

## 2017-11-20 RX ORDER — FENTANYL CITRATE 50 UG/ML
50-100 INJECTION, SOLUTION INTRAMUSCULAR; INTRAVENOUS
Status: DISCONTINUED | OUTPATIENT
Start: 2017-11-20 | End: 2017-11-20 | Stop reason: HOSPADM

## 2017-11-20 RX ADMIN — FENTANYL CITRATE 25 MCG: 50 INJECTION, SOLUTION INTRAMUSCULAR; INTRAVENOUS at 12:19

## 2017-11-20 RX ADMIN — MIDAZOLAM 1.5 MG: 1 INJECTION INTRAMUSCULAR; INTRAVENOUS at 12:24

## 2017-11-20 RX ADMIN — METOPROLOL SUCCINATE 25 MG: 25 TABLET, EXTENDED RELEASE ORAL at 07:55

## 2017-11-20 RX ADMIN — LIDOCAINE HYDROCHLORIDE 30 ML: 20 SOLUTION ORAL; TOPICAL at 12:09

## 2017-11-20 RX ADMIN — METHOHEXITAL SODIUM 50 MG: 500 INJECTION, POWDER, LYOPHILIZED, FOR SOLUTION INTRAMUSCULAR; INTRAVENOUS; RECTAL at 12:53

## 2017-11-20 RX ADMIN — SODIUM CHLORIDE 350 ML: 9 INJECTION, SOLUTION INTRAVENOUS at 13:11

## 2017-11-20 RX ADMIN — ASPIRIN 81 MG: 81 TABLET, COATED ORAL at 07:55

## 2017-11-20 RX ADMIN — POTASSIUM CHLORIDE 20 MEQ: 750 TABLET, EXTENDED RELEASE ORAL at 07:55

## 2017-11-20 RX ADMIN — RIVAROXABAN 20 MG: 20 TABLET, FILM COATED ORAL at 11:27

## 2017-11-20 RX ADMIN — TOPICAL ANESTHETIC 0.5 ML: 200 SPRAY DENTAL; PERIODONTAL at 12:09

## 2017-11-20 ASSESSMENT — ENCOUNTER SYMPTOMS: DYSRHYTHMIAS: 1

## 2017-11-20 NOTE — PLAN OF CARE
Problem: Patient Care Overview  Goal: Plan of Care/Patient Progress Review  Outcome: No Change    D: Pt with hx paroxysmal a fib admitted 11/19 with palpitations, SOB- found to be in A fib.  I/A: Rates varied 's, longest R-R 2.3 sec. Other VSS. Pt denies SOB, angina, lightheadedness, nausea, diaphoresis. Resting between cares without complaint. NPO after MN for possible RYDER/DCCV or other procedures. Educated pt on pathphys of A fib and possible treatment plan options.  P: Awaiting primary team POC and EP consult. Keep NPO for tests. Monitor and assess pt condition and contact treatment team with questions or concerns.

## 2017-11-20 NOTE — PROGRESS NOTES
Pt arrived in ECHO lab for scheduled TEEand cardioversion    Procedure explained, consent form signed by pt and MD, and discharge instructions discussed with patient.     Throat numbed at 1210, pt informed to stay NPO until 4 hours after     Pt given 1.5 mg IV versed and 25mcg IV fentanyl for conscious sedation. Also 50mg IV Brevitol per anesthesia given.    Probe number 51 used for procedure    Patient cardioverted and shocked at 150 joules. Patient tolerated well. Post ekg ordered    Pt denied chest pain and throat pain after procedure completed. Post monitoring completed and vitals stable throughout.     Pt discharged instructions reviewed    Report called to Charlotte PORTILLO

## 2017-11-20 NOTE — ED PROVIDER NOTES
"  History     Chief Complaint   Patient presents with     Irregular Heart Beat     HPI  Dimitris Carroll is a 47 year old male with a history of paroxysmal  atrial fibrillation (on metoprolol and aspirin) who presents today with irregular heart beat. Per review of his chart, he was last seen in Cardiology on 05/31/17 for atrial fibrillation, and it was noted his last episode of a fib was on 05/13/16. Patient states he first noticed his irregular heart beat last night. He states it feels similar to previous episodes of atrial fibrillation. He states he has been taking amiodarone instead of his metoprolol, 2 tabs 200 mg. He does complain of some shortness of breath, but otherwise currently denies chest pain.     PAST MEDICAL HISTORY  Past Medical History:   Diagnosis Date     Atrial fibrillation (H)      Sleep apnea      PAST SURGICAL HISTORY  No past surgical history on file.  FAMILY HISTORY  No family history on file.  SOCIAL HISTORY  Social History   Substance Use Topics     Smoking status: Never Smoker     Smokeless tobacco: Not on file     Alcohol use No     MEDICATIONS  No current facility-administered medications for this encounter.      Current Outpatient Prescriptions   Medication     AMIODARONE HCL PO     metoprolol (TOPROL XL) 25 MG 24 hr tablet     clonazePAM (KLONOPIN) 0.5 MG tablet     clonazePAM (KLONOPIN) 0.5 MG tablet     clonazePAM (KLONOPIN) 0.5 MG tablet     propafenone (RYTHMOL) 300 MG tablet     aspirin EC 81 MG tablet     order for DME     ALLERGIES  No Known Allergies    I have reviewed the Medications, Allergies, Past Medical and Surgical History, and Social History in the Epic system.    Review of Systems   Respiratory: Positive for shortness of breath.    Cardiovascular: Positive for palpitations. Negative for chest pain.   All other systems reviewed and are negative.      Physical Exam   BP: 117/79  Pulse: 71  Heart Rate: 99  Temp: 98  F (36.7  C)  Resp: 18  Height: 190.5 cm (6' 3\")  SpO2: " 97 %      Physical Exam   Constitutional: He is oriented to person, place, and time. He appears well-developed and well-nourished. No distress.   HENT:   Head: Normocephalic and atraumatic.   Eyes: No scleral icterus.   Neck: Normal range of motion. Neck supple.   Cardiovascular: Normal rate.  An irregularly irregular rhythm present.   Neurological: He is alert and oriented to person, place, and time.   Skin: Skin is warm and dry. No rash noted. He is not diaphoretic. No erythema. No pallor.       ED Course     ED Course     Procedures   9:22 PM  The patient was seen and examined by Dr. Ridley in Room 4.                EKG Interpretation:      Interpreted by Dr. Ridley  Time reviewed: 9:24 PM  Symptoms at time of EKG: Irregular heart beat  Rhythm: atrial fibrillation - controlled  Rate: Tachycardia  Axis: Normal  Ectopy: none  Conduction: normal  ST Segments/ T Waves: No ST-T wave changes  Q Waves: none  Comparison to prior: 05/31/17 Sinus bradycardia instead of atrial fibrillation.     Clinical Impression: Known atrial fibrillation.         Critical Care time:  none           Labs Ordered and Resulted from Time of ED Arrival Up to the Time of Departure from the ED   BASIC METABOLIC PANEL - Abnormal; Notable for the following:        Result Value    Glucose 145 (*)     Calcium 8.3 (*)     All other components within normal limits   CBC WITH PLATELETS DIFFERENTIAL   MAGNESIUM            Assessments & Plan (with Medical Decision Making)   This is 47-year-old male coming into emergency room.  With sudden onset of tachycardia and the feeling of going into atrial fibrillation.  He has a history of this.  He took some amiodarone with no improvement of his symptoms.  Currently he is hemodynamically stable.  EKG does show atrial fibrillation with a rate of 80-90.  His symptomatic at this time.  He denies any chest pain.  I did review his previous cardiology note which shows that he is currently on aspirin and metoprolol.  I  discussed the case with on-call cardiology and at this time they will admit to their service for continued care and management.  There is no need for additional cardioversion here in the emergency room.  Patient understands and agrees with admission.    I have reviewed the nursing notes.    I have reviewed the findings, diagnosis, plan and need for follow up with the patient.    New Prescriptions    No medications on file       Final diagnoses:   A-fib (H)     IHardy, am serving as a trained medical scribe to document services personally performed by Loc Ridley MD, based on the provider's statements to me.      ILoc MD, was physically present and have reviewed and verified the accuracy of this note documented by Hardy Ellsworth.     11/19/2017   [facility], [place], EMERGENCY DEPARTMENT     Loc Ridley MD  11/20/17 0055

## 2017-11-20 NOTE — OP NOTE
CARDIOVERSION    PROCEDURE:  direct current cardioversion  PROCEDURE DATE: 11/20/2017     Pre-procedure diagnosis:  Atrial fibrillation  Post-procedure diagnosis: s/p direct current cardioversion.  Complications:  none    BRIEF CLINICAL HISTORY:  Please refer to EP consult note from today for details. Mr. Carroll is a 47 year old male who has a past medical history significant for PAF (CHADSVASC 0) s/p DCCV 10/2015 and recurrent episode 5/16 that self converted after Rythmol, and MILAGROS (uses CPAP). He presented with recurrent AF. He has been restarted on Xarelto.      PROCEDURE:  The patient arrived at the Echo Laboratory in a fasting, non-sedated state.  Informed consent was previously obtained from patient, who understood indications, risks, and benefits of the procedure. He was started on Xarelto today ~2hrs prior to DCCV. Transesophageal echo was performed by the echo lab team to rule out intracardiac thrombus.  With help from Anesthesia, patient was deeply sedated.  150J of synchronized biphasic shock was delivered through the cardiac monitor, and the patient was successfully converted to normal sinus rhythm.  After sedation wore off, patient awoke and remained neurologically intact.  The patient tolerated the procedure well from a hemodynamic and respiratory standpoint without any complications.  He was observed in the Echo Laboratory and then transferred back to inpatient unit after sedation wore off and he was ambulatory.    IMPRESSION:  1.  Successful direct current cardioversion with 150J biphasic shock.    RECOMMENDATIONS/PLANS:  1.   Transfer back to inpatient unit   2.   Continue anticoagulation for 1 month post DCCV then ok to stop    ARIANE Whitfield CNP  Electrophysiology Consult Service  Pager: [phone]

## 2017-11-20 NOTE — ANESTHESIA CARE TRANSFER NOTE
Patient: Dimitris Carroll    Procedure(s):  Cardioversion     Diagnosis: Atrial Fibrillation   Diagnosis Additional Information: No value filed.    Anesthesia Type:   No value filed.     Note:  Airway :Nasal Cannula  Patient transferred to:Telemetry/Step Down Unit  Comments: Pt cardioverted x1 Afib to SR.  Pt alert and breathing comfortably.  VSS.  Report to Echo RN with care transfer.Handoff Report: Identifed the Patient, Identified the Reponsible Provider, Reviewed the pertinent medical history, Discussed the surgical course, Reviewed Intra-OP anesthesia mangement and issues during anesthesia, Set expectations for post-procedure period and Allowed opportunity for questions and acknowledgement of understanding      Vitals: (Last set prior to Anesthesia Care Transfer)    CRNA VITALS  11/20/2017 1233 - 11/20/2017 1303      11/20/2017             NIBP: 97/66    Pulse: 71    SpO2: 97 %    Resp Rate (observed): 10    EKG: Sinus rhythm                Electronically Signed By: ARIANE Chavez CRNA  November 20, 2017  1:03 PM

## 2017-11-20 NOTE — CONSULTS
Electrophysiology Consultation Note   EP Attending: .   Reason for consultation: AF.   Provider requesting consultation: , Cardiology I Service.  Date of Service: 11/20/2017      HPI:   Mr. Carroll is a 47 year old male who has a past medical history significant for PAF (CHADSVASC 0) s/p DCCV 10/2015 and MILAGROS (uses CPAP). He was first diagnosed with A.fib in October 2015 when he suddenly developed palpitations, dizziness, and dyspnea on 10/10/15. He presented to the urgent care and ultimately was sent to the ER on 10/12/15 where he was diagnosed with atrial fibrillation and underwent DCCV x4. The first 3 shocks (200 J) were followed by return of AF and he was subsequently loaded with 300 mg IV Amiodarone and cardioverted again which resulted in sustained NSR. His initial Echo showed LVEF 40-45%. He was started on Pradaxa, Metoprolol XL 25 mg,and a short course of amiodarone.He was also noted to have elevated TSH He was referred to EP for further management. He saw EP in 11/2015. His amiodarone had already been discontinued on 10/28/15. He denied any recurrence of atrial fibrillation. He had a CMRI done in November 2015 showed mild non-ischemic cardiomyopathy with LVEF of 53% and no DE. A stress echo done in December 2015 showed LVEF 55-60%. Cardiac event monitoring showed sinus throughout with rare PACs and PVCs. 9 triggered events all showed NSR some was PACs.  He underwent a sleep study which demonstrated sleep apnea for which he was started on home CPAP. He then had ER visit on 5/13/16 for AF with RVR. He was given 600 mg Rythmol and offered DCCV; however, patient declined. He was started on Xarelto with plan for outpatient DCCV if AF did not self convert. He did eventually convert and did not require DCCV. He then stopped his Xarelto given no need for intervention.      He then presented to [facility] on 11/19/17 with his AF symptoms (palpitations, dizziness, and SANCHEZ). He reports onset of symptoms on  11/18/17 in the afternoon/evening. He was on a pill-in-the-pocket approach with Rythmol; however, he accidentally thought it was amiodarone and took an amiodarone pill. He took another one the morning on 11/19/17 and when symptoms persisted he came into [facility] for evaluation. He was found in AF and was admitted for further management. He continues to have palpitations and SANCHEZ. He reports having more alcohol than usual this weekend about 5 drinks in a 6 hour period. He also feels like he is coming down with a cold. He denies any chest pain/pressures, dizziness, lightheadedness, leg/ankle swelling, PND, orthopnea,or syncopal symptoms. Telemetry shows AF 's. Presenting 12 lead ECG shows AF Vent Rate 99 bpm, QRS 86 ms, QTc 431 ms. Stable renal function and electrolytes. Current cardiac medications include: Toprol XL, ASA, and Propafenone pill-in-the pocket.  Past Medical History:   Past Medical History:   Diagnosis Date     Atrial fibrillation (H)      Sleep apnea      Past Surgical History:   No past surgical history on file.  Allergies: Per MAR     Allergies   Allergen Reactions     Milk Protein Extract GI Disturbance     Wheat Gluten [Gluten Meal] GI Disturbance     Medications:   Per MAR current outpatient cardiovascular medications include: Prescriptions Prior to Admission   Medication Sig Dispense Refill Last Dose     AMIODARONE HCL PO Take 200 mg by mouth 2 times daily as needed   11/19/2017 at Unknown time     metoprolol (TOPROL XL) 25 MG 24 hr tablet Take 1 tablet (25 mg) by mouth daily 30 tablet 11      clonazePAM (KLONOPIN) 0.5 MG tablet Take 1 tablet (0.5 mg) by mouth nightly as needed for anxiety (Patient not taking: Reported on 5/31/2017) 30 tablet 3 Not Taking     clonazePAM (KLONOPIN) 0.5 MG tablet Take one half to one tab before bed prn (Patient not taking: Reported on 5/31/2017) 20 tablet 0 Not Taking     clonazePAM (KLONOPIN) 0.5 MG tablet Take one half to one tab before bed (Patient not taking:  "Reported on 5/31/2017) 30 tablet 0 Not Taking     propafenone (RYTHMOL) 300 MG tablet Take 2 tablets (600 mg) by mouth once as needed For recurrence of atrial fibrillation. One dose per 24 hours as needed. (Patient not taking: Reported on 5/31/2017) 20 tablet 0 Not Taking     aspirin EC 81 MG tablet Take 1 tablet (81 mg) by mouth daily   Not Taking     order for DME Equipment being ordered: CPAPPatient Dimitris Carroll was set up at [place] on December 17, 2015. Patient received a Resmed AirSense 10 Auto. Pressures were set at Auto 7 - 12 cm H2O.   Patient s ramp is 5 cm H2O for Auto and FLEX/EPR is A Flex.  Patient received a [employer] Mask name: SIMPLUS FFmask Size Large, heated tubing and heated humidifier.  Patient is enrolled in the STM Program and does need to meet compliance. Patient has a follow up on TBD with Sole Oquendo NP.   Hailey Perdomo   Not Taking     No current outpatient prescriptions on file.     Current Facility-Administered Medications   Medication Dose Route Frequency     aspirin EC  81 mg Oral Daily     metoprolol  25 mg Oral Daily     sodium chloride (PF)  3 mL Intracatheter Q8H     docusate sodium  100 mg Oral BID     Family History:   No family history on file.  Social History:   Social History   Substance Use Topics     Smoking status: Never Smoker     Smokeless tobacco: Not on file     Alcohol use No       ROS:   A comprehensive 10 point ROS was negative other than as mentioned in HPI.    Physical Examination:   VITALS: /79  Pulse 60  Temp 97.5  F (36.4  C) (Oral)  Resp 16  Ht 1.905 m (6' 3\")  Wt 97.2 kg (214 lb 4.8 oz)  SpO2 95%  BMI 26.79 kg/m2  GENERAL APPEARANCE: AxO, NAD   HEENT: NCAT, EOMI, MMM.   NECK: Supple.  CHEST: CTAB   CARDIOVASCULAR: Irregularly irregular, no murmur.    ABDOMEN: BS+, soft, NT, ND.   EXTREMITIES: No pedal edema. Distal pulses intact.   NEURO: Grossly nonfocal.   PSYCH: Normal affect.  SKIN: Warm and dry.   Data:   Labs:  BMP  Recent " Labs  Lab 17  2204      POTASSIUM 3.9   CHLORIDE 109   CLIFF 8.3*   CO2 26   BUN 22   CR 1.10   *     CBC  Recent Labs  Lab 17   WBC 7.8   RBC 4.97   HGB 15.7   HCT 46.1   MCV 93   MCH 31.6   MCHC 34.1   RDW 12.7        INRNo lab results found in last 7 days.  No results found for: CKTOTAL, CKMB, TROPN  No results found for: CHOL, CHOLHDLRATIO, HDL, LDL  EK/2015 CMRI:  1. The left ventricle is mildly dilated. The wall thickness is normal.  The global systolic function is mildly reduced. The quantitative LV  ejection fraction is 53%. There is mild global hypokinesis.     LV volumes absolute and indexed to BSA with age and gender-matched  normal values in parentheses are listed below:     ED volume: 223 ml (101 - 236 ml)  ED volume index: 104 ml/m2 (52 - 112 ml/m2)  ES volume: 105 ml (28 - 93 ml)  ES volume index: 49 ml/m2     2. The right ventricle is mildly dilated in cavity size with mildly  reduced global systolic function. The quantitative RV ejection  fraction is 51%.     RV volumes absolute and indexed to BSA with age and gender-matched  normal values in parentheses are listed below:     ED volume: 274 ml (110 - 243 ml)  ED volume index: 128 ml/m2 (58 - 115 ml/m2)  ES volume: 134 ml (46 - 112 ml)  ES volume index: 63 ml/m2     3. There is moderate biatrial enlargement.     4. The aortic valve is trileaflet in morphology. There is no  significant aortic valve stenosis or regurgitation.      5. There is no other significant valvular disease.      6. Delayed enhancement imaging demonstrates no evidence of myocardial  infarction, fibrosis or infiltrative disease. This is consistent with  a non-ischemic cardiomyopathy.     IMPRESSION:     Mild non-ischemic cardiomyopathy with LVEF of 53%.    2015 STRESS ECHO:   Interpretation Summary  There is 1 mm ST segment depression in the Inferior leads.  No stress induced regional wall motion abnormalities. Normal resting  LV  function with EF of approximately 60-65%; normal response to exercise with  increase to approximately 70-75%. Normal functional capacity.  Assessment:   Mr. Carroll is a 47 year old male who has a past medical history significant for PAF (CHADSVASC 0) s/p DCCV 10/2015 and MILAGROS (uses CPAP). He was first diagnosed with A.fib in 2015 and ultimately underwent DCCV. He had recurrent AF in May 2016 and was given 600 mg Rythmol and offered DCCV; however, patient declined. He was started on Xarelto with plan for outpatient DCCV if AF did not self convert. He did eventually convert and did not require DCCV. He then stopped his Xarelto given no need for intervention. He is now readmitted with recurrent AF that onset 17 in the afternoon/evening. He was on a pill-in-the-pocket approach with Rythmol; however, he accidentally thought it was amiodarone and took an amiodarone pill. He took another one the morning on 17 and when symptoms persisted he came into [facility] for evaluation. He was found in AF and was admitted for further management. He continues to have palpitations and SANCHEZ. He does report feeling like coming down with a cold and having increased ETOH consumption this weekend.     EP Recommendations:  We discussed in detail with the patient management/treatment options for A.fib includin. Stroke Prophylaxis:  CHADSVASC=  0, corresponding to a 0.2-0.5% annual stroke / systemic emolism event rate. indicating no need for long term oral anticoagulation. We would start him on Xarelto for 1 month in order to do DCCV (as below).  2. Rate Control: Continue Toprol XL.   3. Rhythm Control: Cardioversion, Antiarrhythmics and/or ablation are options for rhythm control. He had DCCV in 10/2015, recurrence in 2016 and was given Rythmol and eventually converted without DCCV. He has been instructed to use Rythmol as pill-in-the-pocket for any AF. He mistakenly took amiodarone instead of Rythmol. We have  re-educated him on Rythmol for pill-in-the-pocket approach. He can take Propafenone 600 mg at onset of AF symptoms.  He has been instructed to call if he remains in AF for 24 hours or come in for evaluation immediately if he is unwell. Given he is now been in AF for >48 hours, we would recommend RYDER/DCCV with 1 month course of anticoagulation. If he develops more frequent AF episodes, we would consider use of daily AAT and ablation for refractory AF.   4. Risk Factor Management: maintain tight BP control and continued MILAGROS treatment.    Follow up as outpatient.     The patient states understanding and is agreeable with plan.   Thank you for allowing us to participate in the care of this patient.     The patient was discussed w/ Dr. Stern.  The above note reflects our joint plan.    ARIANE Whitfield CNP  Electrophysiology Consult Service  Pager: [phone]    EP STAFF NOTE  I have seen and examined the patient as part of a shared visit with ALL Whitfield NP.  I agree with the note above. I reviewed today's vital signs and medications. I have reviewed and discussed with the advanced practice provider their physical examination, assessment, and plan   Briefly, PAF. First recurrence in a long time after some ETOH.  My key history/exam findings are: Afib.   The key management decisions made by me: DCCV. F/u as outpatient.    Calvin Stern MD [facility]  Cardiology - Electrophysiology

## 2017-11-20 NOTE — ANESTHESIA PREPROCEDURE EVALUATION
Anesthesia Evaluation     . Pt has had prior anesthetic.     No history of anesthetic complications          ROS/MED HX    ENT/Pulmonary:     (+)sleep apnea, uses CPAP , . .    Neurologic:       Cardiovascular: Comment: S/p cardioversion x4 unsuccessfully. Amio loaded and on metoprolol with successful cardioversion. Now for repeat due to reverting back to a-fib again.    (+) ----. : . . . :. dysrhythmias (paroxysmal) a-fib, .       METS/Exercise Tolerance:     Hematologic:         Musculoskeletal:         GI/Hepatic:         Renal/Genitourinary:         Endo:         Psychiatric:         Infectious Disease:         Malignancy:         Other:                   Procedure: Procedure(s):  Cardioversion     HPI: Dimitris Carroll is a 47 year old male with a-fib for above procedure    PMHx/PSHx:  Past Medical History:   Diagnosis Date     Atrial fibrillation (H)      Sleep apnea        No past surgical history on file.      No current facility-administered medications on file prior to encounter.   Current Outpatient Prescriptions on File Prior to Encounter:  metoprolol (TOPROL XL) 25 MG 24 hr tablet Take 1 tablet (25 mg) by mouth daily   clonazePAM (KLONOPIN) 0.5 MG tablet Take 1 tablet (0.5 mg) by mouth nightly as needed for anxiety (Patient not taking: Reported on 5/31/2017)   clonazePAM (KLONOPIN) 0.5 MG tablet Take one half to one tab before bed prn (Patient not taking: Reported on 5/31/2017)   clonazePAM (KLONOPIN) 0.5 MG tablet Take one half to one tab before bed (Patient not taking: Reported on 5/31/2017)   propafenone (RYTHMOL) 300 MG tablet Take 2 tablets (600 mg) by mouth once as needed For recurrence of atrial fibrillation. One dose per 24 hours as needed. (Patient not taking: Reported on 5/31/2017)   aspirin EC 81 MG tablet Take 1 tablet (81 mg) by mouth daily   order for DME Equipment being ordered: CPAPPatient Dimitris Carroll was set up at [place] on December 17, 2015. Patient received a Resmed  AirSense 10 Auto. Pressures were set at Auto 7 - 12 cm H2O.   Patient s ramp is 5 cm H2O for Auto and FLEX/EPR is A Flex.  Patient received a [employer] Mask name: SIMPLUS FFmask Size Large, heated tubing and heated humidifier.  Patient is enrolled in the STM Program and does need to meet compliance. Patient has a follow up on TBD with Sole Oquendo NP. Hailey Perdomo       Social Hx:   Social History   Substance Use Topics     Smoking status: Never Smoker     Smokeless tobacco: Not on file     Alcohol use No       Allergies:   Allergies   Allergen Reactions     Milk Protein Extract GI Disturbance     Wheat Gluten [Gluten Meal] GI Disturbance         NPO Status: Per ASA Guidelines    Labs:    Blood Bank:  No results found for: ABO, RH, AS  BMP:  Recent Labs   Lab Test  11/20/17   1007  11/19/17   2204   NA   --   143   POTASSIUM  4.3  3.9   CHLORIDE   --   109   CO2   --   26   BUN   --   22   CR   --   1.10   GLC   --   145*   CLIFF   --   8.3*     CBC:   Recent Labs   Lab Test  11/19/17   2204   WBC  7.8   RBC  4.97   HGB  15.7   HCT  46.1   MCV  93   MCH  31.6   MCHC  34.1   RDW  12.7   PLT  266     Coags:  Recent Labs   Lab Test  11/20/17   1007  05/13/16   0759   INR  0.96  1.07   PTT   --   30                        Anesthesia Plan      History & Physical Review  History and physical reviewed and following examination; no interval change.    ASA Status:  2 .    NPO Status:  > 6 hours    Plan for General with Intravenous induction.     Cardioversion under general anesthesia with brevital      Postoperative Care      Consents  Anesthetic plan, risks, benefits and alternatives discussed with:  Patient..            History and physical assessed; Patient examined.   Risks and alternatives presented and discussed. Patient and family agree. All questions answered.      Jasper Blancas MD  Staff Anesthesiologist  *[phone]

## 2017-11-20 NOTE — ED NOTES
Patient presents to triage c/o possible afib starting last night. Patient took two PRN amiodarone doses, one last night and one today (200 mg each) w/o relief. Denies CP, slight SOB. Heart rate ranges in triage from .

## 2017-11-20 NOTE — PROGRESS NOTES
D: Admit 11/19 for dyspnea and palpitations dx: A Fib  I/A: VSS. Denies pain. A Fib 130s, rhythm slowed following am Metoprolol dose. Patient reports not taking Metoprolol dose at set times; education provided about medications given at scheduled times throughout daytime. Up ind. NPO for RYDER and cardiovert today; completed and returned to unit at 1315. Instructions provided by Echo RN for: okay to eat at 1500, needs someone with patient when discharges for 24 hours, and no alcohol consumption. Writer reviewed instructions with patient; per CARDS 1 provider, will ensure no concerns with eating and potential discharge after 1500.   P: Update CARDS 1 if questions/concerns.   Charlotte Melendez RN

## 2017-11-20 NOTE — ANESTHESIA POSTPROCEDURE EVALUATION
Patient: Dimitris Carroll    Procedure(s):  Cardioversion     Diagnosis:Atrial Fibrillation   Diagnosis Additional Information: No value filed.    Anesthesia Type:  No value filed.    Note:  Anesthesia Post Evaluation    Patient location during evaluation: Bedside  Patient participation: Able to fully participate in evaluation  Level of consciousness: awake and alert  Pain management: adequate  Airway patency: patent  Cardiovascular status: acceptable  Respiratory status: acceptable  Hydration status: acceptable  PONV: none     Anesthetic complications: None          Last vitals:  Vitals:    11/20/17 1303 11/20/17 1306 11/20/17 1312   BP: 98/72 106/72 103/71   Pulse: 68 68 68   Resp: 19 18 18   Temp:      SpO2: 94% 96% 98%         Electronically Signed By: Jasper Blancas MD  November 20, 2017  1:31 PM

## 2017-11-20 NOTE — PROGRESS NOTES
Care Coordinator- Discharge Planning     Admission Date/Time:  11/19/2017  Attending MD:  Siddharth Baca   Data  Chart reviewed, discussed with interdisciplinary team.   Patient was admitted for:   1. A-fib (H)    2. Paroxysmal atrial fibrillation (H)    Assessment  Concerns with insurance coverage for discharge needs: None.  Current Living Situation: Patient lives with spouse. Pt said that he is independent with his own care.   Support System: Supportive and Involved wife.   Services Involved: none currently.   Transportation: Family or Friend will provide    Coordination of Care and Referrals: No home care needs per pt and pt's wife.       Plan  Anticipated Discharge Date:  11/20/17  Anticipated Discharge Plan:  Discharge     CTS Handoff completed:  YES  BETO ROB RN  BSN  Care Coordinator   [phone]

## 2017-11-20 NOTE — H&P
Cardiology History and Physical  Dimitris Carroll MRN: [id number]  Age: 47 year old, : 1970  Primary care provider: Lenin Bermudez            Assessment and Plan:     Mr. Dimitris Carroll is a 48 yo male patient with a PMH notable for paroxysmal atrial fibrillation (CHADSVASC 0), MILAGROS, insomnia who presents today with complaints of palpitations and dyspnea, found to be in atrial fibrillation.     #Paroxysmal Atrial Fibrillation  First diagnosed in 10/2015. Underwent DCCVx4 on 10/12/2015 which were unsuccessful. He was subsequently loaded with amiodarone and then cardioverted again which resulted in sustained NSR. He was startd on pradaxa, metoprolol, short course of amiodarone. He follows with EP, last seen 2017. At that time, he had not had any episodes of AF since 2016; patient confirms that today.   Patient reports recent URI over past few weeks, increased EtOH consumption this weekend. Otherwise no other health changes. Denies chest pain, hemodynamically stable and rate controlled on admission.   - continue toprol XL 25mg daily  - continue PTA ASA 81mg QD   - EP consult placed   - NPO at midnight for possible procedure     #Insomnia clonazepam is on outpatient prescription list, but patient not taking. Per wife, patient has not been sleeping lately. Melatonin PRN available.   #MILAGROS - continue home cpap      FEN:  - NPO at [place]   - replete lytes to K 4, Mg 2  - no mIVF   PPX: SCDs  Code Status: Full      Patient discussed with cardiology fellow Dr. Fraser. To be formally staffed in AM.     Bertha Villarreal MD   Internal Medicine PGY2  Cardiology Service  Pager: [phone]            Chief Complaint:     Palpitations, shortness of breath           History of Present Illness:     History is obtained from the patient     Mr. Dimitris Carroll is a 48 yo male patient with a PMH notable for paroxysmal atrial fibrillation (CHADSVASC 0), MILAGROS, insomnia who presents today with complaints of palpitations.  "    Patient reports he had been in his usual state of health until last night. Noticed sudden onset of palpitations in the evening, can't remember what time specifically. He reports he was told to use amiodarone to \"prevent a trip to the ED.\" He took a dose last night and this morning, but the symptoms persisted so he came in to the ED. He has some mild shortness of breath but otherwise denies dizziness,  chest pain, diaphoresis,orthopnea, LE edema. He has noticed some nasal congestion recently and some general \"cold\" like symptoms -but denies fevers, myalgias, productive cough. Additionally notes he had more alcohol than usual this weekend - reporting ~5 alcoholic beverages over 6h time frame which is more than usual. Otherwise no changes in his diet. Remainder ROS including headaches, vision changes,sore throat, difficulty swallowing, abdominal pain, constipation, diarrhea, dysuria, polyuria, rashes, joint pain.     In the ED, patient was afebrile, HR 71, /79, saturating well on room air. CBC and BMP wnl. EKG was notable for atrial fibrillation. Admitted for further management.           Past Medical History:     Past Medical History:   Diagnosis Date     Atrial fibrillation (H)      Sleep apnea               Past Surgical History:      No past surgical history on file.           Social History:     No history of smoking  Drinks EtOH \"more than he should lately\"- reports increased alcohol consumption this past weekend in particular; wife notes weekly ~2 at baseline  No other drugs           Family History:     Family history reviewed  No family history of heart disease  Father with atrial fibrillation in 80s  Grandmother with brain aneurysm/stroke - sudden death   Sister with brain aneurysm           Allergies:     No Known Allergies           Medications:     No current facility-administered medications for this encounter.      Current Outpatient Prescriptions   Medication Sig     AMIODARONE HCL PO Take 200 " mg by mouth 2 times daily as needed     metoprolol (TOPROL XL) 25 MG 24 hr tablet Take 1 tablet (25 mg) by mouth daily     clonazePAM (KLONOPIN) 0.5 MG tablet Take 1 tablet (0.5 mg) by mouth nightly as needed for anxiety (Patient not taking: Reported on 5/31/2017)     clonazePAM (KLONOPIN) 0.5 MG tablet Take one half to one tab before bed prn (Patient not taking: Reported on 5/31/2017)     clonazePAM (KLONOPIN) 0.5 MG tablet Take one half to one tab before bed (Patient not taking: Reported on 5/31/2017)     propafenone (RYTHMOL) 300 MG tablet Take 2 tablets (600 mg) by mouth once as needed For recurrence of atrial fibrillation. One dose per 24 hours as needed. (Patient not taking: Reported on 5/31/2017)     aspirin EC 81 MG tablet Take 1 tablet (81 mg) by mouth daily     order for DME Equipment being ordered: CPAPPatient Dimitris Carroll was set up at [place] on December 17, 2015. Patient received a Resmed AirSense 10 Auto. Pressures were set at Auto 7 - 12 cm H2O.   Patient s ramp is 5 cm H2O for Auto and FLEX/EPR is A Flex.  Patient received a [employer] Mask name: SIMPLUS FFmask Size Large, heated tubing and heated humidifier.  Patient is enrolled in the STM Program and does need to meet compliance. Patient has a follow up on TBD with Sole Oquendo NP.   Hailey Perdomo                    Physical Exam:     Temp:  [98  F (36.7  C)] 98  F (36.7  C)  Pulse:  [71] 71  Heart Rate:  [87-99] 87  Resp:  [14-18] 14  BP: ()/(78-83) 102/80  SpO2:  [96 %-100 %] 100 %    Gen: Resting comfortably in bed, NAD   HEENT:AT/ NC, PERRL b/l, EOM grossly intact, MMM   PULM/THORAX: Normal effort on RA. Clear to auscultation bilaterally, no rales/rhonchi/wheezes  CV: Irregularly irregular. HR ranging from normal to low 100s during exam. S1 and S2 appreciated, no extra heart sounds, murmurs or rub auscultated. No JVD  ABD: soft, nontender, nondistended. Normoactive bowel sounds.   EXT: Warm, well perfused. No LE edema  noted.  NEURO: AOx4. Speech fluent and articulate. No focal deficits appreciated.             Data:     Na 143   Cl 109     BUN 22  K 3.9      CO2 26     Cr 1.10    WBC 7.8 // Hgb 15.7 // Plt 266        Most Recent Imagin2017 EKG        2015 Echo Stress   Interpretation Summary  There is 1 mm ST segment depression in the Inferior leads.  No stress induced regional wall motion abnormalities. Normal resting LV  function with EF of approximately 60-65%; normal response to exercise with  increase to approximately 70-75%. Normal functional capacity.     2015 [place]      2015 Cardiac MRI  1. The left ventricle is mildly dilated. The wall thickness is normal.  The global systolic function is mildly reduced. The quantitative LV  ejection fraction is 53%. There is mild global hypokinesis.     LV volumes absolute and indexed to BSA with age and gender-matched  normal values in parentheses are listed below:     ED volume: 223 ml (101 - 236 ml)  ED volume index: 104 ml/m2 (52 - 112 ml/m2)  ES volume: 105 ml (28 - 93 ml)  ES volume index: 49 ml/m2     2. The right ventricle is mildly dilated in cavity size with mildly  reduced global systolic function. The quantitative RV ejection  fraction is 51%.     RV volumes absolute and indexed to BSA with age and gender-matched  normal values in parentheses are listed below:     ED volume: 274 ml (110 - 243 ml)  ED volume index: 128 ml/m2 (58 - 115 ml/m2)  ES volume: 134 ml (46 - 112 ml)  ES volume index: 63 ml/m2     3. There is moderate biatrial enlargement.     4. The aortic valve is trileaflet in morphology. There is no  significant aortic valve stenosis or regurgitation.      5. There is no other significant valvular disease.      6. Delayed enhancement imaging demonstrates no evidence of myocardial  infarction, fibrosis or infiltrative disease. This is consistent with  a non-ischemic cardiomyopathy.

## 2017-11-20 NOTE — PROGRESS NOTES
DISCHARGE   Discharged to: Home  Via: Automobile  Accompanied by: Family/Wife  Discharge Instructions: diet, activity, medications, follow up appointments, when to call the MD, and what to watchout for (i.e. s/s of infection, increasing SOB, palpitations, chest pain,).   Patient instructions included to take Metoprolol at same time every morning starting tomorrow (11/21 am) and to start other meds tomorrow, as were previously given. Instructions to also have someone with him for next 24 hours and not to drink alcohol tonight and follow up with primary care provider about new medications and follow up. Follow up with EP set for 1 month.   Prescriptions: To be filled by home pharmacy per pt's request; Rythmol and Xeralto both sent to home pharmacy to  tomorrow. medication list reviewed & sent with pt  Follow Up Appointments: arranged; information given  Belongings: All sent with pt  IV: out  Telemetry: off  Pt exhibits understanding of above discharge instructions; all questions answered.  Charlotte Melnedez RN

## 2017-11-20 NOTE — PROGRESS NOTES
Admission    Pt admitted from ED via litter   Reason for admission: Atrial Fibrillation   Family aware of admission  Belongings: Pt declines sending any belongings/valuables to security at this time. Has clothing, cell phone, shoes, coat in room. Brought metoprolol pills but pt states he will send them home with wife when she arrives.   Access: PIV, SL   Tele placed on pt  Height/weight/VS obtained  Admission profile completed  Teaching: Oriented pt to room, use of call light, unit routine. Educated pt on importance of safety, infection prevention, when to call RN (angina, SOB, lightheadedness/dizziness, etc). Pt verbalized understanding.

## 2017-11-21 ENCOUNTER — CARE COORDINATION (OUTPATIENT)
Dept: CARE COORDINATION | Facility: CLINIC | Age: 47
End: 2017-11-21

## 2017-11-21 LAB
INTERPRETATION ECG - MUSE: NORMAL
INTERPRETATION ECG - MUSE: NORMAL

## 2017-11-22 NOTE — DISCHARGE SUMMARY
[facility] Discharge Summary    Dimitris Carroll MRN# [id number]   Age: 47 year old YOB: 1970     Date of Admission:  11/19/2017  Date of Discharge::  11/20/2017  5:43 PM  Admitting Physician:  Jeremie Correa MD  Discharge Physician:  Hailee Estrella MD            Admission Diagnoses:   A-fib (H) [I48.91]          Discharge Diagnosis:   Patient Active Problem List    Diagnosis Date Noted     Atrial fibrillation (H) 11/20/2017     Priority: Medium     Insomnia due to psychological stress 08/22/2016     Priority: Medium     MILAGROS (obstructive sleep apnea) 01/26/2016     Priority: Medium     Paroxysmal atrial fibrillation (H) 11/17/2015     Priority: Medium             Procedures:     RYDER and cardioversion         Discharge Medications:     Discharge Medication List as of 11/20/2017  4:44 PM      START taking these medications    Details   rivaroxaban ANTICOAGULANT (XARELTO) 20 MG TABS tablet Take 1 tablet (20 mg) by mouth daily (with dinner), Disp-60 tablet, R-0, E-Prescribe         CONTINUE these medications which have NOT CHANGED    Details   metoprolol (TOPROL XL) 25 MG 24 hr tablet Take 1 tablet (25 mg) by mouth daily, Disp-30 tablet, R-11, E-Prescribe      propafenone (RYTHMOL) 300 MG tablet Take 2 tablets (600 mg) by mouth once as needed For recurrence of atrial fibrillation. One dose per 24 hours as needed., Disp-20 tablet, R-0, E-Prescribe      order for DME Equipment being ordered: CPAPPatient Dimitris Carroll was set up at [place] on December 17, 2015. Patient received a Resmed AirSense 10 Auto. Pressures were set at Auto 7 - 12 cm H2O.   Patient s ramp is 5 cm H2O for Auto and FLEX/EPR is A Flex.  Bertin hansen received a [employer] Mask name: SIMPLUS FFmask Size Large, heated tubing and heated humidifier.  Patient is enrolled in the STM Program and does need to meet compliance. Patient has a follow up on TBD with Sole Oquendo NP.   Hailey aldrich        "  STOP taking these medications       AMIODARONE HCL PO Comments:   Reason for Stopping:         clonazePAM (KLONOPIN) 0.5 MG tablet Comments:   Reason for Stopping:         clonazePAM (KLONOPIN) 0.5 MG tablet Comments:   Reason for Stopping:         clonazePAM (KLONOPIN) 0.5 MG tablet Comments:   Reason for Stopping:         aspirin EC 81 MG tablet Comments:   Reason for Stopping:                     Consultations:   Electrophysiology          Brief History of Illness:   Mr. Dimitris Carroll is a 48 yo male patient with a PMH notable for paroxysmal atrial fibrillation (CHADSVASC 0), MILAGROS, insomnia who presents today with complaints of palpitations.      Patient reports he had been in his usual state of health until last night. Noticed sudden onset of palpitations in the evening, can't remember what time specifically. He reports he was told to use amiodarone to \"prevent a trip to the ED.\" He took a dose last night and this morning, but the symptoms persisted so he came in to the ED. He has some mild shortness of breath but otherwise denies dizziness,  chest pain, diaphoresis,orthopnea, LE edema. He has noticed some nasal congestion recently and some general \"cold\" like symptoms -but denies fevers, myalgias, productive cough. Additionally notes he had more alcohol than usual this weekend - reporting ~5 alcoholic beverages over 6h time frame which is more than usual. Otherwise no changes in his diet. Remainder ROS including headaches, vision changes,sore throat, difficulty swallowing, abdominal pain, constipation, diarrhea, dysuria, polyuria, rashes, joint pain.      In the ED, patient was afebrile, HR 71, /79, saturating well on room air. CBC and BMP wnl. EKG was notable for atrial fibrillation. Admitted for further management.           Hospital Course:   #Paroxysmal Atrial Fibrillation  First diagnosed in 10/2015. Underwent DCCVx4 on 10/12/2015 which were unsuccessful. He was subsequently loaded with amiodarone " and then cardioverted again which resulted in sustained NSR. He was started on pradaxa, metoprolol, short course of amiodarone. He follows with EP, last seen 5/2017. At that time, he had not had any episodes of AF since 5/13/2016. He was also started on propafenone as a 'pill in the pocket' therapy, however, he had mistaken amiodarone for this and had therefore taken 2 tablets of amiodarone prior to admission. EP was consulted and since patient's symptoms could not be confirmed to be less than 48 hours he was started on xarelto and underwent RYDER and successful cardioversion. He will be continued on xarelto for at least 8 weeks and plan to follow-up with EP as an outpatient to discuss on-going management if needed. He was advised to not consume any more alcohol. It also appears he was noncompliant with his metoprolol and was encouraged to take this regularly.     Day of discharge exam:   General: Comfortably resting in bed, no apparent distress noted.   Lungs: CTA b/l  CV: RRR following DCCV, no JVD  Extremities: No edema  Neuro: A&O x 4          Discharge Instructions and Follow-Up:   Discharge diet: Regular   Discharge activity: Activity as tolerated   Discharge follow-up: Follow-up with electrophysiology in 4 weeks.            Discharge Disposition:   Discharged to home      Patient discussed with Dr. Irineo Correa the day of discharge.     Hailee Estrella MD

## 2017-11-28 ENCOUNTER — CARE COORDINATION (OUTPATIENT)
Dept: CARDIOLOGY | Facility: CLINIC | Age: 47
End: 2017-11-28

## 2017-11-28 NOTE — PROGRESS NOTES
Patient was inpatient for AF from 11/19/17 to 11/20/17. This is a post hospitalization phone call.     Patient responses in bold. Caller's communications in italics.      STATUS SINCE DISCHARGE  Been doing well, no AF recurrence    MEDICATIONS  **Told patient he only needs to be on Xarelto 4 weeks post cardioversion per post cardioversion guidelines.** He has chadsvasc of 0.            FOLLOW UP  You are scheduled to see Ami Jones NP on 12/20/17 for AF at 1030.        OTHER CONCERNS:  NA

## 2017-12-20 ENCOUNTER — OFFICE VISIT (OUTPATIENT)
Dept: CARDIOLOGY | Facility: CLINIC | Age: 47
End: 2017-12-20
Attending: NURSE PRACTITIONER
Payer: COMMERCIAL

## 2017-12-20 ENCOUNTER — PRE VISIT (OUTPATIENT)
Dept: CARDIOLOGY | Facility: CLINIC | Age: 47
End: 2017-12-20

## 2017-12-20 ENCOUNTER — TELEPHONE (OUTPATIENT)
Dept: SLEEP MEDICINE | Facility: CLINIC | Age: 47
End: 2017-12-20

## 2017-12-20 VITALS
DIASTOLIC BLOOD PRESSURE: 73 MMHG | BODY MASS INDEX: 26.31 KG/M2 | HEIGHT: 75 IN | SYSTOLIC BLOOD PRESSURE: 116 MMHG | HEART RATE: 55 BPM | OXYGEN SATURATION: 98 % | WEIGHT: 211.6 LBS

## 2017-12-20 DIAGNOSIS — I48.0 PAROXYSMAL ATRIAL FIBRILLATION (H): Primary | ICD-10-CM

## 2017-12-20 PROCEDURE — 93010 ELECTROCARDIOGRAM REPORT: CPT | Mod: ZP | Performed by: INTERNAL MEDICINE

## 2017-12-20 PROCEDURE — 99213 OFFICE O/P EST LOW 20 MIN: CPT | Mod: 25 | Performed by: NURSE PRACTITIONER

## 2017-12-20 ASSESSMENT — PAIN SCALES - GENERAL: PAINLEVEL: NO PAIN (0)

## 2017-12-20 NOTE — PATIENT INSTRUCTIONS
Stop Xarelto     Start aspirin 81 mg daily    Can take propafenone as needed if A.fib episode. Please let us know if you develop further A.fib episodes.   Cardiovascular Clinic:   [address] [place] [address]. [place] [address]  Your Care Team:  EP Cardiology   Telephone Number     Ami Jones NP [phone]   Dora Granados RN [phone]     For scheduling appts or procedures:    Little Carranza   [phone]     As always, Thank you for trusting us with your health care needs!

## 2017-12-20 NOTE — MR AVS SNAPSHOT
After Visit Summary   12/20/2017    Dimitris Carroll    MRN: [id number]           Patient Information     Date Of Birth          1970        Visit Information        Provider Department      12/20/2017 10:30 AM Ami Jones APRN CNP [facility]        Today's Diagnoses     Paroxysmal atrial fibrillation (H)    -  1      Care Instructions        Stop Xarelto     Start aspirin 81 mg daily    Can take propafenone as needed if A.fib episode. Please let us know if you develop further A.fib episodes.   Cardiovascular Clinic:   [address] [place] [address]. [place] [address]  Your Care Team:  EP Cardiology   Telephone Number     Ami Jones NP [phone]   Dora Granados RN [phone]     For scheduling appts or procedures:    Little Carranza   [phone]     As always, Thank you for trusting us with your health care needs!            Follow-ups after your visit        Additional Services     Follow-Up with EP Cardiac Advanced Practice Cardiologist - 1 Year                 Future tests that were ordered for you today     Open Future Orders        Priority Expected Expires Ordered    Follow-Up with EP Cardiac Advanced Practice Cardiologist - 1 Year Routine 12/20/2018 12/21/2018 12/20/2017            Who to contact     If you have questions or need follow up information about today's clinic visit or your schedule please contact [facility] directly at [phone].  Normal or non-critical lab and imaging results will be communicated to you by MyChart, letter or phone within 4 business days after the clinic has received the results. If you do not hear from us within 7 days, please contact the clinic through MyChart or phone. If you have a critical or abnormal lab result, we will notify you by phone as soon as possible.  Submit refill requests through [employer] or call your pharmacy and they will forward the refill request to us. Please allow 3 business days for your refill to  "be completed.          Additional Information About Your Visit        [employer] Information     [employer] gives you secure access to your electronic health record. If you see a primary care provider, you can also send messages to your care team and make appointments. If you have questions, please call your primary care clinic.  If you do not have a primary care provider, please call [phone] and they will assist you.        Care EveryWhere ID     This is your Care EveryWhere ID. This could be used by other organizations to access your [facility] medical records  [id number]        Your Vitals Were     Pulse Height Pulse Oximetry BMI (Body Mass Index)          55 1.905 m (6' 3\") 98% 26.45 kg/m2         Blood Pressure from Last 3 Encounters:   12/20/17 116/73   11/20/17 109/76   05/31/17 102/68    Weight from Last 3 Encounters:   12/20/17 96 kg (211 lb 9.6 oz)   11/20/17 97.2 kg (214 lb 4.8 oz)   05/31/17 96.4 kg (212 lb 8 oz)              We Performed the Following     EKG 12-lead, tracing only (Same Day)          Today's Medication Changes          These changes are accurate as of: 12/20/17 11:48 AM.  If you have any questions, ask your nurse or doctor.               Start taking these medicines.        Dose/Directions    aspirin 81 MG tablet   Commonly known as:  ASPIRIN LOW DOSE   Used for:  Paroxysmal atrial fibrillation (H)   Started by:  Ami Sullivan APRN CNP        Dose:  81 mg   Take 1 tablet (81 mg) by mouth daily   Quantity:  30 tablet   Refills:  11         Stop taking these medicines if you haven't already. Please contact your care team if you have questions.     rivaroxaban ANTICOAGULANT 20 MG Tabs tablet   Commonly known as:  XARELTO   Stopped by:  Ami Sullivan APRN CNP                Where to get your medicines      These medications were sent to [facility]/pharmacy #[id number] - [place], [place] - [address] [place] [address]     Phone:  " [phone]     aspirin 81 MG tablet                Primary Care Provider Office Phone # Fax #    Lenin Bermudez [phone]       [facility] [address] [address]        Equal Access to Services     ROLANDO KARIN : Hadii aad ku hadasho Soomaali, waaxda luqadaha, qaybta kaalmada adeegyada, waxay idiin hayaan adegeri khisha lathompsonn . So [facility] [phone].    ATENCIÓN: Si habla español, tiene a meza disposición servicios gratuitos de asistencia lingüística. Llame al [phone].    We comply with applicable federal civil rights laws and [place] laws. We do not discriminate on the basis of race, color, national origin, age, disability, sex, sexual orientation, or gender identity.            Thank you!     Thank you for choosing [facility]  for your care. Our goal is always to provide you with excellent care. Hearing back from our patients is one way we can continue to improve our services. Please take a few minutes to complete the written survey that you may receive in the mail after your visit with us. Thank you!             Your Updated Medication List - Protect others around you: Learn how to safely use, store and throw away your medicines at www.disposemymeds.org.          This list is accurate as of: 12/20/17 11:48 AM.  Always use your most recent med list.                   Brand Name Dispense Instructions for use Diagnosis    aspirin 81 MG tablet    ASPIRIN LOW DOSE    30 tablet    Take 1 tablet (81 mg) by mouth daily    Paroxysmal atrial fibrillation (H)       metoprolol 25 MG 24 hr tablet    TOPROL XL    30 tablet    Take 1 tablet (25 mg) by mouth daily    Paroxysmal atrial fibrillation (H)       order for DME      Equipment being ordered: CPAPPatient Dimitris Paulinoion was set up at [place] on December 17, 2015. Patient received a Resmed AirSense 10 Auto. Pressures were set at Auto 7 - 12 cm H2O.   Patient?s ramp is 5 cm H2O for Auto and FLEX/EPR  is A Flex.  Patient received a [employer] Mask name: SIMPLUS FFmask Size Large, heated tubing and heated humidifier.  Patient is enrolled in the STM Program and does need to meet compliance. Patient has a follow up on TBD with Sole Oquendo NP.  Hailey Perdomo        propafenone 300 MG tablet    RYTHMOL    20 tablet    Take 2 tablets (600 mg) by mouth once as needed For recurrence of atrial fibrillation. One dose per 24 hours as needed.    Paroxysmal atrial fibrillation (H)

## 2017-12-20 NOTE — PROGRESS NOTES
Electrophysiology Clinic Note  HPI:   Mr. Carroll is a 47 year old male who has a past medical history significant for PAF (CHADSVASC 0) s/p DCCV 10/2015 and MILAGROS (uses CPAP). He was first diagnosed with A.fib in October 2015 when he suddenly developed palpitations, dizziness, and dyspnea on 10/10/15. He presented to the urgent care and ultimately was sent to the ER on 10/12/15 where he was diagnosed with atrial fibrillation and underwent DCCV x4. The first 3 shocks (200 J) were followed by return of AF and he was subsequently loaded with 300 mg IV Amiodarone and cardioverted again which resulted in sustained NSR. His initial Echo showed LVEF 40-45%. He was started on Pradaxa, Metoprolol XL 25 mg,and a short course of amiodarone.He was also noted to have elevated TSH He was referred to EP for further management. He saw EP in 11/2015. His amiodarone had already been discontinued on 10/28/15. He denied any recurrence of atrial fibrillation. He had a CMRI done in November 2015 showed mild non-ischemic cardiomyopathy with LVEF of 53% and no DE. A stress echo done in December 2015 showed LVEF 55-60%. Cardiac event monitoring showed sinus throughout with rare PACs and PVCs. 9 triggered events all showed NSR some was PACs.  He underwent a sleep study which demonstrated sleep apnea for which he was started on home CPAP. He then had ER visit on 5/13/16 for AF with RVR. He was given 600 mg Rythmol and offered DCCV; however, patient declined. He was started on Xarelto with plan for outpatient DCCV if AF did not self convert. He did eventually convert and did not require DCCV. He then stopped his Xarelto given no need for intervention.       He then presented to [facility] on 11/19/17 with his AF symptoms (palpitations, dizziness, and SANCHEZ). He reports onset of symptoms on 11/18/17 in the afternoon/evening. He was on a pill-in-the-pocket approach with Rythmol; however, he accidentally thought it was amiodarone and took an amiodarone  pill. He took another amiodarone in the morning on 11/19/17 and when symptoms persisted he came into [facility] for evaluation. He was found in AF and was admitted for further management. He reported having more alcohol than usual this weekend about 5 drinks in a 6 hour period. He was started on Xarelto and underwent RYDER/DCCV on 11/20/17.   He presents today for follow up. He reports feeling well. He has not had any further AF episodes. He denies any chest pain/pressures, dizziness, lightheadedness, leg/ankle swelling, PND, orthopnea,or syncopal symptoms. Presenting 12 lead ECG shows SB Vent rate 47 bpm,  ms, QRS 98 ms,  ms. Current cardiac medications include: Toprol XL, ASA, and Propafenone pill-in-the pocket.    PAST MEDICAL HISTORY:  Past Medical History:   Diagnosis Date     Atrial fibrillation (H)      Sleep apnea        CURRENT MEDICATIONS:  Current Outpatient Prescriptions   Medication Sig Dispense Refill     rivaroxaban ANTICOAGULANT (XARELTO) 20 MG TABS tablet Take 1 tablet (20 mg) by mouth daily (with dinner) 60 tablet 0     propafenone (RYTHMOL) 300 MG tablet Take 2 tablets (600 mg) by mouth once as needed For recurrence of atrial fibrillation. One dose per 24 hours as needed. 20 tablet 0     metoprolol (TOPROL XL) 25 MG 24 hr tablet Take 1 tablet (25 mg) by mouth daily 30 tablet 11     order for DME Equipment being ordered: CPAPPatient Dimitris Carroll was set up at [place] on December 17, 2015. Patient received a [employer] AirSense 10 Auto. Pressures were set at Auto 7 - 12 cm H2O.   Patient s ramp is 5 cm H2O for Auto and FLEX/EPR is A Flex.  Patient received a [employer] Mask name: SIMPLUS FFmask Size Large, heated tubing and heated humidifier.  Patient is enrolled in the STM Program and does need to meet compliance. Patient has a follow up on TBD with Sole Oquendo NP.   Hailey Perdomo         PAST SURGICAL HISTORY:  Past Surgical History:   Procedure Laterality Date     ANESTHESIA  CARDIOVERSION N/A 11/20/2017    Procedure: ANESTHESIA CARDIOVERSION;  Cardioversion ;  Surgeon: GENERIC ANESTHESIA PROVIDER;  Location:  OR       ALLERGIES:     Allergies   Allergen Reactions     Milk Protein Extract GI Disturbance     Wheat Gluten [Gluten Meal] GI Disturbance       FAMILY HISTORY:  No family history on file.    SOCIAL HISTORY:  Social History   Substance Use Topics     Smoking status: Never Smoker     Smokeless tobacco: Not on file     Alcohol use No       ROS:   A comprehensive 10 point review of systems negative other than as mentioned in HPI.  Exam:  There were no vitals taken for this visit.  GENERAL APPEARANCE: alert and no distress  HEENT: no icterus, no xanthelasmas, normal pupil size and reaction, normal palate, mucosa moist, no central cyanosis  NECK: no adenopathy, no asymmetry, masses, or scars, thyroid normal to palpation and no bruits, JVP not elevated  RESPIRATORY: lungs clear to auscultation - no rales, rhonchi or wheezes, no use of accessory muscles, no retractions, respirations are unlabored, normal respiratory rate  CARDIOVASCULAR: regular rhythm, normal S1 with physiologic split S2, no S3 or S4 and no murmur, click or rub, precordium quiet with normal PMI.  ABDOMEN: soft, non tender, bowel sounds normal, non-distended  EXTREMITIES: peripheral pulses normal, no edema  NEURO: alert and oriented to person/place/time, normal speech, gait and affect  SKIN: no ecchymoses, no rashes  PSYCH: normal affect, cooperative    Labs:  Reviewed.     Testing/Procedures:    12/2015 STRESS ECHOCARDIOGRAM:   Interpretation Summary  There is 1 mm ST segment depression in the Inferior leads.  No stress induced regional wall motion abnormalities. Normal resting LV  function with EF of approximately 60-65%; normal response to exercise with  increase to approximately 70-75%. Normal functional capacity.  ______________________________________________________________________________     Stress  Patient  was given 4ml mixture of 1.5ml Definity and 8.5ml saline.  There is 1 mm ST segment depression in the Inferior leads.  Limiting Sympton: fatigue.  Peak MVO2 33 ml/kg/min .  Percent predicted MVO2 112 %.  RPP 21889.  The maximum dose of atropine was 1.2mg.  Maximum workload 225 powers.  Target Heart Rate was achieved.  Exercise was stopped due to fatigue.  The patient did not exhibit any symptoms during exercise.  Normal blood pressure response with stress.  Normal heart rate response to exercise.     Rest  The baseline ECG displays normal sinus rhythm.     Stress Results                                       Maximum Predicted HR:  173 bpm            Target HR: 147 bpm        % Maximum Predicted HR:  89 %                      +--------+--------+----------+------+----+                   :  Stage :Duration:Heart Rate:  BPDos   e:                   :        : (mm:ss):   (bpm)  :      :    :                   +--------+--------+----------+------+----+                   :BASELINE:  0:00 55          :102/71:0.00:                   +--------+--------+----------+------+----+                   :  PEAK  :  15:23 1     54   :174/91:1.20:                   +--------+--------+----------+------+----+                        Stress Duration:  15:21 mm:ss *                    Maximum Stress HR:   154 bpm *        Left Ventricle  Left ventricular systolic function is normal.  Aortic Valve  The aortic valve is normal in structure and function.     Mitral Valve  The mitral valve is normal in structure and function.     Tricuspid Valve  The tricuspid valve is normal in structure and function.     Right Ventricle  The right ventricular systolic function is normal.     Pericardium  There is no pericardial effusion.      Assessment and Plan:   Mr. Carroll is a 47 year old male who has a past medical history significant for PAF (CHADSVASC 0) s/p DCCV 10/2015 and 11/20/2017 and MILAGROS (uses CPAP). He presents today for follow up after recent  DCCV. He is doing well without further AF.     We discussed in detail with the patient management/treatment options for Kenji includin. Stroke Prophylaxis:  CHADSVASC=  0, corresponding to a 0.2-0.5% annual stroke / systemic emolism event rate. indicating no need for long term oral anticoagulation. He has been on Xarleto for 1 month after DCCV. He can now stop given CHADSVASC 0. He will resume .  2. Rate Control: Continue Toprol XL.   3. Rhythm Control: Cardioversion, Antiarrhythmics and/or ablation are options for rhythm control. He had DCCV in 10/2015, recurrence in 2016 and was given Rythmol and eventually converted without DCCV. Then DCCV 17. He has been instructed to use Rythmol as pill-in-the-pocket for any AF.  He can take Propafenone 600 mg at onset of AF symptoms.  He has been instructed to call if he remains in AF for 24 hours or come in for evaluation immediately if he is unwell.   4. Risk Factor Management: maintain tight BP control and continued MILAGROS treatment (he is requesting a new mask which we will help arrange for him).    Follow up in 1 year.       The patient states understanding and is agreeable with plan.   ARIANE Whitfield CNP  Pager: [phone]

## 2017-12-20 NOTE — LETTER
12/20/2017      RE: Dimitris Carroll  [address]  [facility] [address]       Dear Colleague,    Thank you for the opportunity to participate in the care of your patient, Dimitris Carroll, at the [facility]. Please see a copy of my visit note below.    Electrophysiology Clinic Note  HPI:   Mr. Carroll is a 47 year old male who has a past medical history significant for PAF (CHADSVASC 0) s/p DCCV 10/2015 and MILAGROS (uses CPAP). He was first diagnosed with A.fib in October 2015 when he suddenly developed palpitations, dizziness, and dyspnea on 10/10/15. He presented to the urgent care and ultimately was sent to the ER on 10/12/15 where he was diagnosed with atrial fibrillation and underwent DCCV x4. The first 3 shocks (200 J) were followed by return of AF and he was subsequently loaded with 300 mg IV Amiodarone and cardioverted again which resulted in sustained NSR. His initial Echo showed LVEF 40-45%. He was started on Pradaxa, Metoprolol XL 25 mg,and a short course of amiodarone.He was also noted to have elevated TSH He was referred to EP for further management. He saw EP in 11/2015. His amiodarone had already been discontinued on 10/28/15. He denied any recurrence of atrial fibrillation. He had a CMRI done in November 2015 showed mild non-ischemic cardiomyopathy with LVEF of 53% and no DE. A stress echo done in December 2015 showed LVEF 55-60%. Cardiac event monitoring showed sinus throughout with rare PACs and PVCs. 9 triggered events all showed NSR some was PACs.  He underwent a sleep study which demonstrated sleep apnea for which he was started on home CPAP. He then had ER visit on 5/13/16 for AF with RVR. He was given 600 mg Rythmol and offered DCCV; however, patient declined. He was started on Xarelto with plan for outpatient DCCV if AF did not self convert. He did eventually convert and did not require DCCV. He then stopped his Xarelto given  no need for intervention.       He then presented to [facility] on 11/19/17 with his AF symptoms (palpitations, dizziness, and SANCHEZ). He reports onset of symptoms on 11/18/17 in the afternoon/evening. He was on a pill-in-the-pocket approach with Rythmol; however, he accidentally thought it was amiodarone and took an amiodarone pill. He took another amiodarone in the morning on 11/19/17 and when symptoms persisted he came into [facility] for evaluation. He was found in AF and was admitted for further management. He reported having more alcohol than usual this weekend about 5 drinks in a 6 hour period. He was started on Xarelto and underwent RYDER/DCCV on 11/20/17.   He presents today for follow up. He reports feeling well. He has not had any further AF episodes. He denies any chest pain/pressures, dizziness, lightheadedness, leg/ankle swelling, PND, orthopnea,or syncopal symptoms. Presenting 12 lead ECG shows SB Vent rate 47 bpm,  ms, QRS 98 ms,  ms. Current cardiac medications include: Toprol XL, ASA, and Propafenone pill-in-the pocket.    PAST MEDICAL HISTORY:  Past Medical History:   Diagnosis Date     Atrial fibrillation (H)      Sleep apnea        CURRENT MEDICATIONS:  Current Outpatient Prescriptions   Medication Sig Dispense Refill     rivaroxaban ANTICOAGULANT (XARELTO) 20 MG TABS tablet Take 1 tablet (20 mg) by mouth daily (with dinner) 60 tablet 0     propafenone (RYTHMOL) 300 MG tablet Take 2 tablets (600 mg) by mouth once as needed For recurrence of atrial fibrillation. One dose per 24 hours as needed. 20 tablet 0     metoprolol (TOPROL XL) 25 MG 24 hr tablet Take 1 tablet (25 mg) by mouth daily 30 tablet 11     order for DME Equipment being ordered: CPAPPatient Dimitris Carroll was set up at [place] on December 17, 2015. Patient received a [employer] AirSense 10 Auto. Pressures were set at Auto 7 - 12 cm H2O.   Patient s ramp is 5 cm H2O for Auto and FLEX/EPR is A Flex.  Patient received a [place]  Mask name: SIMPLUS FFmask Size Large, heated tubing and heated humidifier.  Patient is enrolled in the STM Program and does need to meet compliance. Patient has a follow up on TBD with Sole Oquendo NP.   Hailey Perdomo         PAST SURGICAL HISTORY:  Past Surgical History:   Procedure Laterality Date     ANESTHESIA CARDIOVERSION N/A 11/20/2017    Procedure: ANESTHESIA CARDIOVERSION;  Cardioversion ;  Surgeon: GENERIC ANESTHESIA PROVIDER;  Location: [facility]       ALLERGIES:     Allergies   Allergen Reactions     Milk Protein Extract GI Disturbance     Wheat Gluten [Gluten Meal] GI Disturbance       FAMILY HISTORY:  No family history on file.    SOCIAL HISTORY:  Social History   Substance Use Topics     Smoking status: Never Smoker     Smokeless tobacco: Not on file     Alcohol use No       ROS:   A comprehensive 10 point review of systems negative other than as mentioned in HPI.  Exam:  There were no vitals taken for this visit.  GENERAL APPEARANCE: alert and no distress  HEENT: no icterus, no xanthelasmas, normal pupil size and reaction, normal palate, mucosa moist, no central cyanosis  NECK: no adenopathy, no asymmetry, masses, or scars, thyroid normal to palpation and no bruits, JVP not elevated  RESPIRATORY: lungs clear to auscultation - no rales, rhonchi or wheezes, no use of accessory muscles, no retractions, respirations are unlabored, normal respiratory rate  CARDIOVASCULAR: regular rhythm, normal S1 with physiologic split S2, no S3 or S4 and no murmur, click or rub, precordium quiet with normal PMI.  ABDOMEN: soft, non tender, bowel sounds normal, non-distended  EXTREMITIES: peripheral pulses normal, no edema  NEURO: alert and oriented to person/place/time, normal speech, gait and affect  SKIN: no ecchymoses, no rashes  PSYCH: normal affect, cooperative    Labs:  Reviewed.     Testing/Procedures:    12/2015 STRESS ECHOCARDIOGRAM:   Interpretation Summary  There is 1 mm ST segment depression in the Inferior  leads.  No stress induced regional wall motion abnormalities. Normal resting LV  function with EF of approximately 60-65%; normal response to exercise with  increase to approximately 70-75%. Normal functional capacity.  ______________________________________________________________________________     Stress  Patient was given 4ml mixture of 1.5ml Definity and 8.5ml saline.  There is 1 mm ST segment depression in the Inferior leads.  Limiting Sympton: fatigue.  Peak MVO2 33 ml/kg/min .  Percent predicted MVO2 112 %.  RPP 29737.  The maximum dose of atropine was 1.2mg.  Maximum workload 225 powers.  Target Heart Rate was achieved.  Exercise was stopped due to fatigue.  The patient did not exhibit any symptoms during exercise.  Normal blood pressure response with stress.  Normal heart rate response to exercise.     Rest  The baseline ECG displays normal sinus rhythm.     Stress Results                                       Maximum Predicted HR:  173 bpm            Target HR: 147 bpm        % Maximum Predicted HR:  89 %                      +--------+--------+----------+------+----+                   :  Stage :Duration:Heart Rate:  BPDos   e:                   :        : (mm:ss):   (bpm)  :      :    :                   +--------+--------+----------+------+----+                   :BASELINE:  0:00 55          :102/71:0.00:                   +--------+--------+----------+------+----+                   :  PEAK  :  15:23 1     54   :174/91:1.20:                   +--------+--------+----------+------+----+                        Stress Duration:  15:21 mm:ss *                    Maximum Stress HR:   154 bpm *        Left Ventricle  Left ventricular systolic function is normal.  Aortic Valve  The aortic valve is normal in structure and function.     Mitral Valve  The mitral valve is normal in structure and function.     Tricuspid Valve  The tricuspid valve is normal in structure and function.     Right Ventricle  The right  ventricular systolic function is normal.     Pericardium  There is no pericardial effusion.      Assessment and Plan:   Mr. Carroll is a 47 year old male who has a past medical history significant for PAF (CHADSVASC 0) s/p DCCV 10/2015 and 2017 and MILAGROS (uses CPAP). He presents today for follow up after recent DCCV. He is doing well without further AF.     We discussed in detail with the patient management/treatment options for Kenji includin. Stroke Prophylaxis:  CHADSVASC=  0, corresponding to a 0.2-0.5% annual stroke / systemic emolism event rate. indicating no need for long term oral anticoagulation. He has been on Xarleto for 1 month after DCCV. He can now stop given CHADSVASC 0. He will resume .  2. Rate Control: Continue Toprol XL.   3. Rhythm Control: Cardioversion, Antiarrhythmics and/or ablation are options for rhythm control. He had DCCV in 10/2015, recurrence in 2016 and was given Rythmol and eventually converted without DCCV. Then DCCV 17. He has been instructed to use Rythmol as pill-in-the-pocket for any AF.  He can take Propafenone 600 mg at onset of AF symptoms.  He has been instructed to call if he remains in AF for 24 hours or come in for evaluation immediately if he is unwell.   4. Risk Factor Management: maintain tight BP control and continued MILAGROS treatment (he is requesting a new mask which we will help arrange for him).    Follow up in 1 year.       The patient states understanding and is agreeable with plan.   Ami Jones, ARIANE CNP  Pager: [phone]

## 2017-12-21 LAB — INTERPRETATION ECG - MUSE: NORMAL

## 2017-12-27 ENCOUNTER — TELEPHONE (OUTPATIENT)
Dept: SLEEP MEDICINE | Facility: CLINIC | Age: 47
End: 2017-12-27

## 2018-01-20 ENCOUNTER — TELEPHONE (OUTPATIENT)
Dept: CARDIOLOGY | Facility: CLINIC | Age: 48
End: 2018-01-20

## 2018-01-20 NOTE — TELEPHONE ENCOUNTER
Pharmacy requesting to refill xarelto 20 mg tab      Not on pt med list      [facility]   [address] [place] [id number]

## 2018-01-21 ENCOUNTER — MYC MEDICAL ADVICE (OUTPATIENT)
Dept: SLEEP MEDICINE | Facility: CLINIC | Age: 48
End: 2018-01-21

## 2018-01-22 NOTE — TELEPHONE ENCOUNTER
Called pt and spoke to him regarding his mask. He is going to contact [facility] to see about exchanging his mask due to high leak and noise.  Pt does not feel the mask leaking but his leak is quite high and he hears it and it keeps him awake.

## 2018-01-25 ENCOUNTER — HOSPITAL ENCOUNTER (OUTPATIENT)
Facility: CLINIC | Age: 48
End: 2018-01-25
Attending: INTERNAL MEDICINE | Admitting: INTERNAL MEDICINE

## 2018-01-25 ENCOUNTER — TELEPHONE (OUTPATIENT)
Dept: CARDIOLOGY | Facility: CLINIC | Age: 48
End: 2018-01-25

## 2018-01-25 ENCOUNTER — CARE COORDINATION (OUTPATIENT)
Dept: CARDIOLOGY | Facility: CLINIC | Age: 48
End: 2018-01-25

## 2018-01-25 DIAGNOSIS — I48.91 ATRIAL FIBRILLATION (H): Primary | ICD-10-CM

## 2018-01-25 DIAGNOSIS — I48.0 PAF (PAROXYSMAL ATRIAL FIBRILLATION) (H): Primary | ICD-10-CM

## 2018-01-25 RX ORDER — POTASSIUM CHLORIDE 1500 MG/1
20 TABLET, EXTENDED RELEASE ORAL
Status: CANCELLED | OUTPATIENT
Start: 2018-01-25

## 2018-01-25 RX ORDER — LIDOCAINE 40 MG/G
CREAM TOPICAL
Status: CANCELLED | OUTPATIENT
Start: 2018-01-25

## 2018-01-25 RX ORDER — MAGNESIUM SULFATE HEPTAHYDRATE 40 MG/ML
2 INJECTION, SOLUTION INTRAVENOUS
Status: CANCELLED | OUTPATIENT
Start: 2018-01-25

## 2018-01-25 RX ORDER — POTASSIUM CHLORIDE 1500 MG/1
40 TABLET, EXTENDED RELEASE ORAL
Status: CANCELLED | OUTPATIENT
Start: 2018-01-25

## 2018-01-25 NOTE — PROGRESS NOTES
Please see My Chart message.  Called and spoke with patient.  Propafenone is not working to convert patient to NSR.  Patient would like to schedule DCCV. Orders placed per Dr. Stern and scheduling. Called and gave patient time and instructions for prep.    Follow these instructions:    1. Report to the GOLD waiting room in the [facility] on: 700 am    2. DO NOT EAT OR DRINK ANYTHING FOR 6 HOURS PRIOR TO ARRIVAL.     3. The morning of your procedure you may take your scheduled medications with a SIP of water. If you take diabetic medications or a diuretic, you may hold these.     4. You will receive medication that makes you sleepy; you will need a  and someone to stay with you for 24 hours following this procedure.    You should not make any legal decisions for 24 hours following discharge.    Citlali Sosa RN, BSN  Cardiology Care Coordinator  [facility]  [email or web address]  [phone]

## 2018-01-25 NOTE — TELEPHONE ENCOUNTER
Patient called stating that he is in A fib and took a dose of Propafenone 600 mg last evening at 9 PM.  He states that when he goes into A fib, he feels his heart racing and a little anxious.  He is wondering if he is able to take Propafenone again.     Date: 1/25/2018    Time of Call: 10:16 AM     Diagnosis:  A fib      [ TORB ] Ordering provider: Ami Jin  Order: Ok to take Propafenone 30-60 min after initial dose     Order received by: Adair Hobson RN     Follow-up/additional notes: Discussed with patient recommendations from Ami Jin.  Instructed to take a dose this AM and call back in one hour with how his is feeling.  Dimitris states that he understands information provided and will call with any further questions or concerns.      Discussed with Ami further, patient may take another dose of Propafenone 30-60 min today after dose.  If he remains in A fib, ideally would like to set up him for a cardioversion 48 hours after onset of A fib.  Otherwise could do a RYDER/cardioversion with anticoagulation.

## 2018-02-08 ENCOUNTER — APPOINTMENT (OUTPATIENT)
Dept: SLEEP MEDICINE | Facility: CLINIC | Age: 48
End: 2018-02-08
Payer: COMMERCIAL

## 2018-03-01 ENCOUNTER — OFFICE VISIT (OUTPATIENT)
Dept: ORTHOPEDICS | Facility: CLINIC | Age: 48
End: 2018-03-01
Payer: COMMERCIAL

## 2018-03-01 ENCOUNTER — OFFICE VISIT (OUTPATIENT)
Dept: UROLOGY | Facility: CLINIC | Age: 48
End: 2018-03-01
Payer: COMMERCIAL

## 2018-03-01 ENCOUNTER — PRE VISIT (OUTPATIENT)
Dept: UROLOGY | Facility: CLINIC | Age: 48
End: 2018-03-01

## 2018-03-01 ENCOUNTER — RADIANT APPOINTMENT (OUTPATIENT)
Dept: GENERAL RADIOLOGY | Facility: CLINIC | Age: 48
End: 2018-03-01
Attending: PREVENTIVE MEDICINE
Payer: COMMERCIAL

## 2018-03-01 ENCOUNTER — RADIANT APPOINTMENT (OUTPATIENT)
Dept: ULTRASOUND IMAGING | Facility: CLINIC | Age: 48
End: 2018-03-01
Attending: UROLOGY
Payer: COMMERCIAL

## 2018-03-01 VITALS
WEIGHT: 210 LBS | BODY MASS INDEX: 26.25 KG/M2 | SYSTOLIC BLOOD PRESSURE: 115 MMHG | HEART RATE: 82 BPM | DIASTOLIC BLOOD PRESSURE: 80 MMHG | RESPIRATION RATE: 16 BRPM

## 2018-03-01 VITALS
BODY MASS INDEX: 26.11 KG/M2 | WEIGHT: 210 LBS | DIASTOLIC BLOOD PRESSURE: 80 MMHG | HEIGHT: 75 IN | SYSTOLIC BLOOD PRESSURE: 115 MMHG

## 2018-03-01 DIAGNOSIS — M54.50 LUMBAGO: ICD-10-CM

## 2018-03-01 DIAGNOSIS — M51.369 LUMBAR DEGENERATIVE DISC DISEASE: ICD-10-CM

## 2018-03-01 DIAGNOSIS — N50.89 TESTICULAR LUMP: ICD-10-CM

## 2018-03-01 DIAGNOSIS — M54.17 RADICULAR PAIN OF LUMBOSACRAL REGION: ICD-10-CM

## 2018-03-01 DIAGNOSIS — N50.89 LUMP IN TESTIS: Primary | ICD-10-CM

## 2018-03-01 DIAGNOSIS — M54.42 MIDLINE LOW BACK PAIN WITH LEFT-SIDED SCIATICA, UNSPECIFIED CHRONICITY: Primary | ICD-10-CM

## 2018-03-01 DIAGNOSIS — N50.89 TESTICULAR LUMP: Primary | ICD-10-CM

## 2018-03-01 RX ORDER — MELOXICAM 15 MG/1
15 TABLET ORAL DAILY
Qty: 30 TABLET | Refills: 1 | Status: SHIPPED | OUTPATIENT
Start: 2018-03-01 | End: 2019-01-16

## 2018-03-01 ASSESSMENT — PAIN SCALES - GENERAL: PAINLEVEL: NO PAIN (1)

## 2018-03-01 NOTE — TELEPHONE ENCOUNTER
Patient with history of testicular lump coming in for consult with Dr. Mccracken. Imaging prior. Patient chart reviewed, no need for call, all records available and ready for appointment.

## 2018-03-01 NOTE — LETTER
"3/1/2018       RE: Dimitris Carroll  [address]  [facility] [address]     Dear Colleague,    Thank you for referring your patient, Dimitris Carroll, to the [facility] UROLOGY AND INST FOR PROSTATE AND UROLOGIC CANCERS at [facility]. Please see a copy of my visit note below.    We are pleased to see . Dimitris Carroll in consultation at the request of Dr. Collazo for the evaluation of chief complaint listed below    Chief Complaint:    Testicular pain          History of Present Illness:   Dimitris Carroll is a(n) 48 year old male w/ PMH of paroxysmal afib (s/p cardioversion in the past, on metoprolol) and MILAGROS who presents for evaluation of a right testicular \"lump\" that he felt on self exam two months ago. It is not painful and not associated with dysuria/hematuria/sx of UTI. He denies history of STIs   Pain resolved now.    Testicular U/S today is read as follows:  Impression:   1. No worrisome intratesticular mass.  2. Small left appendix testis.  3. Small bilateral hydroceles.           Past Medical History:   As above           Past Surgical History:   None         Social History:    with  children  Smoking: never  Alcohol: social   IV Drug Use: None         Family History:   No urologic cancers in the family.         Allergies:     Allergies   Allergen Reactions     Milk Protein Extract GI Disturbance     Wheat Gluten [Gluten Meal] GI Disturbance            Medications:   Aspirin, metoprolol          REVIEW OF SYSTEMS:    See HPI for pertinent details.  Remainder of 10-point ROS negative.         PHYSICAL EXAM   /80 (BP Location: Left arm, Patient Position: Sitting, Cuff Size: Adult Large)  Pulse 82  Resp 16  Wt 95.3 kg (210 lb)  BMI 26.25 kg/m2  GENERAL: No acute distress. Well nourished.   HEENT:  Sclerae anicteric.  Conjunctivae pink.  Moist mucous membranes.  LUNGS:  Non-labored breathing  :  Phallus circumcised, meatus adequate, no " "plaques palpated. Testes descended bilaterally, no intratesticular masses.  Epididymes non-tender.  No varicoceles or inguinal hernias.  SKIN: No rashes.  Dry.     EXTREMITIES:  Warm, well perfused.   NEURO: normal gait, no focal deficits.           LABS AND IMAGING:   Imaging as above          ASSESSMENT:   Dimitris Carroll is a 48 year old male who presents for evaluation of a right testicular 'lump\" that he felt on self exam 2 months ago. US and exam are benign. He is asymptomatic               PLAN:   Reassurance    Jenny Joya MD MS  Urology Resident    Patient was seen and examined with Dr. Mccracken      I saw and examined the patient with the resident today.  I agree with the resident note and plan of care as above.  Reassured that there is no evidence of testis cancer or any pathology.  PRN urology follow-up.      Again, thank you for allowing me to participate in the care of your patient.      Sincerely,    Jamal Mccracken MD      "

## 2018-03-01 NOTE — PROGRESS NOTES
"We are pleased to see Mr. Dimitris Carroll in consultation at the request of Dr. Collazo for the evaluation of chief complaint listed below    Chief Complaint:    Testicular pain          History of Present Illness:   Dimitris Carroll is a(n) 48 year old male w/ PMH of paroxysmal afib (s/p cardioversion in the past, on metoprolol) and MILAGROS who presents for evaluation of a right testicular \"lump\" that he felt on self exam two months ago. It is not painful and not associated with dysuria/hematuria/sx of UTI. He denies history of STIs   Pain resolved now.    Testicular U/S today is read as follows:  Impression:   1. No worrisome intratesticular mass.  2. Small left appendix testis.  3. Small bilateral hydroceles.           Past Medical History:   As above           Past Surgical History:   None         Social History:    with  children  Smoking: never  Alcohol: social   IV Drug Use: None         Family History:   No urologic cancers in the family.         Allergies:     Allergies   Allergen Reactions     Milk Protein Extract GI Disturbance     Wheat Gluten [Gluten Meal] GI Disturbance            Medications:   Aspirin, metoprolol          REVIEW OF SYSTEMS:    See HPI for pertinent details.  Remainder of 10-point ROS negative.         PHYSICAL EXAM   /80 (BP Location: Left arm, Patient Position: Sitting, Cuff Size: Adult Large)  Pulse 82  Resp 16  Wt 95.3 kg (210 lb)  BMI 26.25 kg/m2  GENERAL: No acute distress. Well nourished.   HEENT:  Sclerae anicteric.  Conjunctivae pink.  Moist mucous membranes.  LUNGS:  Non-labored breathing  :  Phallus circumcised, meatus adequate, no plaques palpated. Testes descended bilaterally, no intratesticular masses.  Epididymes non-tender.  No varicoceles or inguinal hernias.  SKIN: No rashes.  Dry.     EXTREMITIES:  Warm, well perfused.   NEURO: normal gait, no focal deficits.           LABS AND IMAGING:   Imaging as above          ASSESSMENT:   Dimitris Carroll is " "a 48 year old male who presents for evaluation of a right testicular 'lump\" that he felt on self exam 2 months ago. US and exam are benign. He is asymptomatic               PLAN:   Reassurance    Jenny Joya MD MS  Urology Resident    Patient was seen and examined with Dr. Mccracken      I saw and examined the patient with the resident today.  I agree with the resident note and plan of care as above.  Reassured that there is no evidence of testis cancer or any pathology.  PRN urology follow-up.    Jamal Mccracken MD  Urology Staff   "

## 2018-03-01 NOTE — PROGRESS NOTES
HISTORY OF PRESENT ILLNESS  Mr. Carroll is a pleasant 48 year old year old male who presents to clinic today with recurrent low back pain and tingling in left foot  Left foot tingling occurs after sitting or standing too long, changes and improves with change in position.  Has a history of spondy at a young age  Dimitris explains that he has never had any imaging of his back, and has no pain in his foot currently  Location: left foot, low back  Quality:  achy pain    Severity: 4/10 at worst    Duration: many years  Timing: occurs intermittently  Modifying factors:  resting and non-use makes it better, movement and use makes it worse  Associated signs & symptoms: tingling in left foot  Previous similar pain: yes  Additional history: as documented    MEDICAL HISTORY  Patient Active Problem List   Diagnosis     Paroxysmal atrial fibrillation (H)     MILAGROS (obstructive sleep apnea)     Insomnia due to psychological stress     Atrial fibrillation (H)       Current Outpatient Prescriptions   Medication Sig Dispense Refill     meloxicam (MOBIC) 15 MG tablet Take 1 tablet (15 mg) by mouth daily 30 tablet 1     aspirin (ASPIRIN LOW DOSE) 81 MG tablet Take 1 tablet (81 mg) by mouth daily 30 tablet 11     propafenone (RYTHMOL) 300 MG tablet Take 2 tablets (600 mg) by mouth once as needed For recurrence of atrial fibrillation. One dose per 24 hours as needed. 20 tablet 0     metoprolol (TOPROL XL) 25 MG 24 hr tablet Take 1 tablet (25 mg) by mouth daily 30 tablet 11     order for DME Equipment being ordered: CPAPPatient Dimitris Carroll was set up at [facility] on December 17, 2015. Patient received a Resmed AirSense 10 Auto. Pressures were set at Auto 7 - 12 cm H2O.   Patient s ramp is 5 cm H2O for Auto and FLEX/EPR is A Flex.  Patient received a [employer] Mask name: SIMPLUS FFmask Size Large, heated tubing and heated humidifier.  Patient is enrolled in the STM Program and does need to meet compliance. Patient has a follow  "up on TBD with Sole Oquendo NP.   Hailey Perdomo         Allergies   Allergen Reactions     Milk Protein Extract GI Disturbance     Wheat Gluten [Gluten Meal] GI Disturbance       No family history on file.    Additional medical/Social/Surgical histories reviewed in [facility] and updated as appropriate.     REVIEW OF SYSTEMS (3/1/2018)  10 point ROS of systems including Constitutional, Eyes, Respiratory, Cardiovascular, Gastroenterology, Genitourinary, Integumentary, Musculoskeletal, Psychiatric were all negative except for pertinent positives noted in my HPI.     PHYSICAL EXAM  Vitals:    03/01/18 1623   BP: 115/80   Weight: 95.3 kg (210 lb)   Height: 1.905 m (6' 3\")     Vital Signs: /80  Ht 1.905 m (6' 3\")  Wt 95.3 kg (210 lb)  BMI 26.25 kg/m2 Patient declined being weighed. Body mass index is 26.25 kg/(m^2).    General  - normal appearance, in no obvious distress  CV  - normal peripheral perfusion  Pulm  - normal respiratory pattern, non-labored  Musculoskeletal - lumbar spine  - stance: normal gait without limp, no obvious leg length discrepancy, normal heel and toe walk  - inspection: normal bone and joint alignment, no obvious scoliosis  - palpation: no paravertebral or bony tenderness  - ROM: flexion exacerbates pain in low back, normal extension, sidebending, rotation all cause some low back pain  - strength: lower extremities 5/5 in all planes  - special tests:  (+) straight leg raise- left side low back pain  (+) slump test- low back pain  Neuro  - patellar and Achilles DTRs 2+ bilaterally, left lower extremity sensory deficit throughout distal L5 distribution, grossly normal coordination, normal muscle tone  Skin  - no ecchymosis, erythema, warmth, or induration, no obvious rash  Psych  - interactive, appropriate, normal mood and affect    ASSESSMENT & PLAN  47 yo male with left foot numbness/tingling due to lumbar ddd, disc herniation  Reviewed xray lumbar shows disc space destruction at L4/5 and " L5/s1  Reviewed pathophysiology, consider MRI if HEP doesn't improve  mobic PRN  F/u PRN    Herson Jenkins MD, CAQSM

## 2018-03-01 NOTE — MR AVS SNAPSHOT
After Visit Summary   3/1/2018    Dimitris Carroll    MRN: [id number]           Patient Information     Date Of Birth          1970        Visit Information        Provider Department      3/1/2018 4:20 PM Herson Jenkins MD [facility]        Today's Diagnoses     Midline low back pain with left-sided sciatica, unspecified chronicity    -  1    Radicular pain of lumbosacral region        Lumbar degenerative disc disease           Follow-ups after your visit        Your next 10 appointments already scheduled     Mar 21, 2018  7:35 AM CDT   (Arrive by 7:20 AM)   New Patient Visit with Herson Manjarrez MD   [facility] ([facility])    [address]  [address] [facility] [address] [phone]              Who to contact     Please call your clinic at [phone] to:    Ask questions about your health    Make or cancel appointments    Discuss your medicines    Learn about your test results    Speak to your doctor            Additional Information About Your Visit        [email or web address] Information     [employer] gives you secure access to your electronic health record. If you see a primary care provider, you can also send messages to your care team and make appointments. If you have questions, please call your primary care clinic.  If you do not have a primary care provider, please call [phone] and they will assist you.      [employer] is an electronic gateway that provides easy, online access to your medical records. With [employer], you can request a clinic appointment, read your test results, renew a prescription or communicate with your care team.     To access your existing account, please contact your [facility] or call [phone] for assistance.        Care EveryWhere ID     This is your Care EveryWhere ID. This could be used by other organizations to access your [place]  "medical records  [id number]        Your Vitals Were     Height BMI (Body Mass Index)                1.905 m (6' 3\") 26.25 kg/m2           Blood Pressure from Last 3 Encounters:   03/01/18 115/80   03/01/18 115/80   12/20/17 116/73    Weight from Last 3 Encounters:   03/01/18 95.3 kg (210 lb)   03/01/18 95.3 kg (210 lb)   12/20/17 96 kg (211 lb 9.6 oz)                 Today's Medication Changes          These changes are accurate as of 3/1/18  5:47 PM.  If you have any questions, ask your nurse or doctor.               Start taking these medicines.        Dose/Directions    meloxicam 15 MG tablet   Commonly known as:  MOBIC   Used for:  Radicular pain of lumbosacral region, Lumbar degenerative disc disease   Started by:  Herson Jenkins MD        Dose:  15 mg   Take 1 tablet (15 mg) by mouth daily   Quantity:  30 tablet   Refills:  1            Where to get your medicines      These medications were sent to [facility] [address] [place] [address] [address] AT [facility] [address]  [address] [facility] [address]     Phone:  [phone]     meloxicam 15 MG tablet                Primary Care Provider Office Phone # Fax #    Lenin Bermudez [phone]       [facility] [address]  [facility] [address]        Equal Access to Services     [facility] KARIN AH: Hadii aad ku hadasho Soomaali, waaxda luqadaha, qaybta kaalmada adeegyada, dai perry. So [facility] [phone].    ATENCIÓN: Si habla keila, tiene a meza disposición servicios gratuitos de asistencia lingüística. Llame al [phone].    We comply with applicable federal civil rights laws and [place] laws. We do not discriminate on the basis of race, color, national origin, age, disability, sex, sexual orientation, or gender identity.            Thank you!     Thank you for choosing [facility]  for your care. Our goal is " always to provide you with excellent care. Hearing back from our patients is one way we can continue to improve our services. Please take a few minutes to complete the written survey that you may receive in the mail after your visit with us. Thank you!             Your Updated Medication List - Protect others around you: Learn how to safely use, store and throw away your medicines at www.disposemymeds.org.          This list is accurate as of 3/1/18  5:47 PM.  Always use your most recent med list.                   Brand Name Dispense Instructions for use Diagnosis    aspirin 81 MG tablet    ASPIRIN LOW DOSE    30 tablet    Take 1 tablet (81 mg) by mouth daily    Paroxysmal atrial fibrillation (H)       meloxicam 15 MG tablet    MOBIC    30 tablet    Take 1 tablet (15 mg) by mouth daily    Radicular pain of lumbosacral region, Lumbar degenerative disc disease       metoprolol succinate 25 MG 24 hr tablet    TOPROL XL    30 tablet    Take 1 tablet (25 mg) by mouth daily    Paroxysmal atrial fibrillation (H)       order for DME      Equipment being ordered: CPAPPatient Dimitris VICTORIA Carly was set up at [facility] on December 17, 2015. Patient received a Resmed AirSense 10 Auto. Pressures were set at Auto 7 - 12 cm H2O.   Patient?s ramp is 5 cm H2O for Auto and FLEX/EPR is A Flex.  Patient received a [employer] Mask name: SIMPLUS FFmask Size Large, heated tubing and heated humidifier.  Patient is enrolled in the STM Program and does need to meet compliance. Patient has a follow up on TBD with Sole Oquendo NP.  Hailey Perdomo        propafenone 300 MG tablet    RYTHMOL    20 tablet    Take 2 tablets (600 mg) by mouth once as needed For recurrence of atrial fibrillation. One dose per 24 hours as needed.    Paroxysmal atrial fibrillation (H)

## 2018-03-01 NOTE — PROGRESS NOTES
SPORTS & ORTHOPEDIC WALK-IN VISIT 3/1/2018    Primary Care Physician: DAQUAN    Reason for visit:     What part of your body is injured / painful?  left foot    What caused the injury /pain? No inciting event     How long ago did your injury occur or pain begin? 1 year    What are your most bothersome symptoms? Pain, Numbness and Tingling    How would you characterize your symptom?  stabbing, sharp and shooting    What makes your symptoms better? Nothing    What makes your symptoms worse? Walking    Have you been previously seen for this problem? Yes, podiatrist RICKY 1/18     Medical History:    Any recent changes to your medical history? No    Any new medication prescribed since last visit? No    Have you had surgery on this body part before? No    Social History:    Occupation: Education     Handedness: Right    Exercise: None    Review of Systems:    Do you have fever, chills, weight loss? No    Do you have any vision problems? No    Do you have any chest pain or edema? No    Do you have any shortness of breath or wheezing?  No    Do you have stomach problems? No    Do you have any numbness or focal weakness? No    Do you have diabetes? No    Do you have problems with bleeding or clotting? No    Do you have an rashes or other skin lesions? No       Patient seen and examined. Agree with above.  Dr Jenkins

## 2018-03-01 NOTE — MR AVS SNAPSHOT
After Visit Summary   3/1/2018    Dimitris Carroll    MRN: [id number]           Patient Information     Date Of Birth          1970        Visit Information        Provider Department      3/1/2018 3:20 PM Jamal Mccracken MD [facility] Urology and [facility] for Prostate and Urologic Cancers        Today's Diagnoses     Lump in testis    -  1       Follow-ups after your visit        Follow-up notes from your care team     Return if symptoms worsen or fail to improve.      Your next 10 appointments already scheduled     Mar 21, 2018  7:35 AM CDT   (Arrive by 7:20 AM)   New Patient Visit with Herson Manjarrez MD   [facility] ([facility])    [address]  [address] [facility] [address] [phone]              Who to contact     Please call your clinic at [phone] to:    Ask questions about your health    Make or cancel appointments    Discuss your medicines    Learn about your test results    Speak to your doctor            Additional Information About Your Visit        [email or web address] Information     [employer] gives you secure access to your electronic health record. If you see a primary care provider, you can also send messages to your care team and make appointments. If you have questions, please call your primary care clinic.  If you do not have a primary care provider, please call [phone] and they will assist you.      [employer] is an electronic gateway that provides easy, online access to your medical records. With [employer], you can request a clinic appointment, read your test results, renew a prescription or communicate with your care team.     To access your existing account, please contact your [facility] or call [phone] for assistance.        Care EveryWhere ID     This is your Care EveryWhere ID. This could be used by other organizations to access your [place] medical records  [id number]         Your Vitals Were     Pulse Respirations BMI (Body Mass Index)             82 16 26.25 kg/m2          Blood Pressure from Last 3 Encounters:   03/01/18 115/80   03/01/18 115/80   12/20/17 116/73    Weight from Last 3 Encounters:   03/01/18 95.3 kg (210 lb)   03/01/18 95.3 kg (210 lb)   12/20/17 96 kg (211 lb 9.6 oz)              Today, you had the following     No orders found for display         Today's Medication Changes          These changes are accurate as of 3/1/18 11:02 PM.  If you have any questions, ask your nurse or doctor.               Start taking these medicines.        Dose/Directions    meloxicam 15 MG tablet   Commonly known as:  MOBIC   Used for:  Radicular pain of lumbosacral region, Lumbar degenerative disc disease   Started by:  Herson Jenkins MD        Dose:  15 mg   Take 1 tablet (15 mg) by mouth daily   Quantity:  30 tablet   Refills:  1            Where to get your medicines      [facility] [address] [place] [address] [address] AT [facility] [address]  [address] [facility] [address]     Phone:  [phone]     meloxicam 15 MG tablet                Primary Care Provider Office Phone # Fax #    Lenin Bermudez [phone]       [facility] [address]  [place] [address]        Equal Access to Services     [facility] AH: Hadii aad ku hadasho Soomaali, waaxda luqadaha, qaybta kaalmada adeegyada, waxay haroon skinner adegeri perry. So [facility] [phone].    ATENCIÓN: Si habla español, tiene a meza disposición servicios gratuitos de asistencia lingüística. Patrick al [phone].    We comply with applicable federal civil rights laws and [place] laws. We do not discriminate on the basis of race, color, national origin, age, disability, sex, sexual orientation, or gender identity.            Thank you!     Thank you for choosing [facility] UROLOGY AND [facility] FOR PROSTATE AND  UROLOGIC CANCERS  for your care. Our goal is always to provide you with excellent care. Hearing back from our patients is one way we can continue to improve our services. Please take a few minutes to complete the written survey that you may receive in the mail after your visit with us. Thank you!             Your Updated Medication List - Protect others around you: Learn how to safely use, store and throw away your medicines at www.disposemymeds.org.          This list is accurate as of 3/1/18 11:02 PM.  Always use your most recent med list.                   Brand Name Dispense Instructions for use Diagnosis    aspirin 81 MG tablet    ASPIRIN LOW DOSE    30 tablet    Take 1 tablet (81 mg) by mouth daily    Paroxysmal atrial fibrillation (H)       meloxicam 15 MG tablet    MOBIC    30 tablet    Take 1 tablet (15 mg) by mouth daily    Radicular pain of lumbosacral region, Lumbar degenerative disc disease       metoprolol succinate 25 MG 24 hr tablet    TOPROL XL    30 tablet    Take 1 tablet (25 mg) by mouth daily    Paroxysmal atrial fibrillation (H)       order for DME      Equipment being ordered: CPAPPatient Dimitris Carroll was set up at [place] on December 17, 2015. Patient received a Resmed AirSense 10 Auto. Pressures were set at Auto 7 - 12 cm H2O.   Patient?s ramp is 5 cm H2O for Auto and FLEX/EPR is A Flex.  Patient received a [employer] Mask name: SIMPLUS FFmask Size Large, heated tubing and heated humidifier.  Patient is enrolled in the STM Program and does need to meet compliance. Patient has a follow up on TBD with Sole Oquendo NP.  Hailey Perdomo        propafenone 300 MG tablet    RYTHMOL    20 tablet    Take 2 tablets (600 mg) by mouth once as needed For recurrence of atrial fibrillation. One dose per 24 hours as needed.    Paroxysmal atrial fibrillation (H)

## 2018-03-01 NOTE — LETTER
3/1/2018       RE: Dimitris Carroll  [address]  [facility] [address]     Dear Colleague,    Thank you for referring your patient, Dimitris Carroll, to the [facility] SPORTS AND ORTHOPAEDIC WALK IN CLINIC at [facility]. Please see a copy of my visit note below.          SPORTS & ORTHOPEDIC WALK-IN VISIT 3/1/2018    Primary Care Physician: DAQUAN    Reason for visit:     What part of your body is injured / painful?  left foot    What caused the injury /pain? No inciting event     How long ago did your injury occur or pain begin? 1 year    What are your most bothersome symptoms? Pain, Numbness and Tingling    How would you characterize your symptom?  stabbing, sharp and shooting    What makes your symptoms better? Nothing    What makes your symptoms worse? Walking    Have you been previously seen for this problem? Yes, podiatrist RICKY 1/18     Medical History:    Any recent changes to your medical history? No    Any new medication prescribed since last visit? No    Have you had surgery on this body part before? No    Social History:    Occupation: Education     Handedness: Right    Exercise: None    Review of Systems:    Do you have fever, chills, weight loss? No    Do you have any vision problems? No    Do you have any chest pain or edema? No    Do you have any shortness of breath or wheezing?  No    Do you have stomach problems? No    Do you have any numbness or focal weakness? No    Do you have diabetes? No    Do you have problems with bleeding or clotting? No    Do you have an rashes or other skin lesions? No       Patient seen and examined. Agree with above.  Dr Jenkins    HISTORY OF PRESENT ILLNESS  Mr. Carroll is a pleasant 48 year old year old male who presents to clinic today with recurrent low back pain and tingling in left foot  Left foot tingling occurs after sitting or standing too long, changes and improves with change in position.  Has a history of spondy at a  young age  Dimitris explains that he has never had any imaging of his back, and has no pain in his foot currently  Location: left foot, low back  Quality:  achy pain    Severity: 4/10 at worst    Duration: many years  Timing: occurs intermittently  Modifying factors:  resting and non-use makes it better, movement and use makes it worse  Associated signs & symptoms: tingling in left foot  Previous similar pain: yes  Additional history: as documented    MEDICAL HISTORY  Patient Active Problem List   Diagnosis     Paroxysmal atrial fibrillation (H)     MILAGROS (obstructive sleep apnea)     Insomnia due to psychological stress     Atrial fibrillation (H)       Current Outpatient Prescriptions   Medication Sig Dispense Refill     meloxicam (MOBIC) 15 MG tablet Take 1 tablet (15 mg) by mouth daily 30 tablet 1     aspirin (ASPIRIN LOW DOSE) 81 MG tablet Take 1 tablet (81 mg) by mouth daily 30 tablet 11     propafenone (RYTHMOL) 300 MG tablet Take 2 tablets (600 mg) by mouth once as needed For recurrence of atrial fibrillation. One dose per 24 hours as needed. 20 tablet 0     metoprolol (TOPROL XL) 25 MG 24 hr tablet Take 1 tablet (25 mg) by mouth daily 30 tablet 11     order for DME Equipment being ordered: CPAPPatient Dimitris Carroll was set up at [place] on December 17, 2015. Patient received a Resmed AirSense 10 Auto. Pressures were set at Auto 7 - 12 cm H2O.   Patient s ramp is 5 cm H2O for Auto and FLEX/EPR is A Flex.  Patient received a [employer] Mask name: SIMPLUS FFmask Size Large, heated tubing and heated humidifier.  Patient is enrolled in the STM Program and does need to meet compliance. Patient has a follow up on TBD with Sole Oquendo NP.   Hailey Perdomo         Allergies   Allergen Reactions     Milk Protein Extract GI Disturbance     Wheat Gluten [Gluten Meal] GI Disturbance       No family history on file.    Additional medical/Social/Surgical histories reviewed in [facility] and updated as appropriate.    "  REVIEW OF SYSTEMS (3/1/2018)  10 point ROS of systems including Constitutional, Eyes, Respiratory, Cardiovascular, Gastroenterology, Genitourinary, Integumentary, Musculoskeletal, Psychiatric were all negative except for pertinent positives noted in my HPI.     PHYSICAL EXAM  Vitals:    03/01/18 1623   BP: 115/80   Weight: 95.3 kg (210 lb)   Height: 1.905 m (6' 3\")     Vital Signs: /80  Ht 1.905 m (6' 3\")  Wt 95.3 kg (210 lb)  BMI 26.25 kg/m2 Patient declined being weighed. Body mass index is 26.25 kg/(m^2).    General  - normal appearance, in no obvious distress  CV  - normal peripheral perfusion  Pulm  - normal respiratory pattern, non-labored  Musculoskeletal - lumbar spine  - stance: normal gait without limp, no obvious leg length discrepancy, normal heel and toe walk  - inspection: normal bone and joint alignment, no obvious scoliosis  - palpation: no paravertebral or bony tenderness  - ROM: flexion exacerbates pain in low back, normal extension, sidebending, rotation all cause some low back pain  - strength: lower extremities 5/5 in all planes  - special tests:  (+) straight leg raise- left side low back pain  (+) slump test- low back pain  Neuro  - patellar and Achilles DTRs 2+ bilaterally, left lower extremity sensory deficit throughout distal L5 distribution, grossly normal coordination, normal muscle tone  Skin  - no ecchymosis, erythema, warmth, or induration, no obvious rash  Psych  - interactive, appropriate, normal mood and affect    ASSESSMENT & PLAN  47 yo male with left foot numbness/tingling due to lumbar ddd, disc herniation  Reviewed xray lumbar shows disc space destruction at L4/5 and L5/s1  Reviewed pathophysiology, consider MRI if HEP doesn't improve  mobic PRN  F/u PRN      Again, thank you for allowing me to participate in the care of your patient.      Sincerely,    Herson Jenkins MD      "

## 2018-03-21 ENCOUNTER — OFFICE VISIT (OUTPATIENT)
Dept: INTERNAL MEDICINE | Facility: CLINIC | Age: 48
End: 2018-03-21
Payer: COMMERCIAL

## 2018-03-21 VITALS
BODY MASS INDEX: 27.01 KG/M2 | WEIGHT: 216.1 LBS | SYSTOLIC BLOOD PRESSURE: 128 MMHG | DIASTOLIC BLOOD PRESSURE: 77 MMHG | HEART RATE: 65 BPM | OXYGEN SATURATION: 96 % | TEMPERATURE: 97.7 F

## 2018-03-21 DIAGNOSIS — I48.91 ATRIAL FIBRILLATION, UNSPECIFIED TYPE (H): Primary | ICD-10-CM

## 2018-03-21 ASSESSMENT — PAIN SCALES - GENERAL: PAINLEVEL: MILD PAIN (2)

## 2018-03-21 NOTE — PROGRESS NOTES
HPI:    Pt. Comes into establish care today. He has h/o PAF seen in Cardiology 12/20/2017 and is on propafenone and metoprolol. No current sxs. Seen by Dr. Mccracken, Urology 3/1/2018 and had unremarkable testicular U/S. Seen by Dr. Jenkins, ortho 3/1/2018  for back and L foot pain. He had some degenerative disc findings lumbar X-ray. He had meeting to attend today and could not wait.    PE:    No exam today    A/P:    I will see him this Saturday at 9 AM 3/24/2018    1. PAF he remains on current medications and no current sxs.  2. L foot and back complaints. He has concerns regarding neuropathy. May get plain X-ray foot and follow up  3. Will clarify colonoscopy status  4. Need to clarify immunization status  5. Future labs fasting lipids and PSA  6. Will discuss need for dermatology next visit    Total time spent 20 minutes.  More than 50% of the time spent with Mr. Carroll on counseling / coordinating his care

## 2018-03-21 NOTE — NURSING NOTE
Chief Complaint   Patient presents with     Establish Care     Patient here to establish care, foot problem.     Elaina Owen LPN at 7:23 AM on 3/21/2018.

## 2018-03-21 NOTE — MR AVS SNAPSHOT
After Visit Summary   3/21/2018    Dimitris Carroll    MRN: [id number]           Patient Information     Date Of Birth          1970        Visit Information        Provider Department      3/21/2018 7:35 AM Herson Manjarrez MD [facility]        Today's Diagnoses     Atrial fibrillation, unspecified type (H)    -  1       Follow-ups after your visit        Your next 10 appointments already scheduled     Mar 24, 2018  9:00 AM CDT   (Arrive by 8:45 AM)   Return Visit with Herson Manjarrez MD   [facility] ([facility])    [address] [place] [id number] [phone]              Who to contact     Please call your clinic at [phone] to:    Ask questions about your health    Make or cancel appointments    Discuss your medicines    Learn about your test results    Speak to your doctor            Additional Information About Your Visit        MyChart Information     [employer] gives you secure access to your electronic health record. If you see a primary care provider, you can also send messages to your care team and make appointments. If you have questions, please call your primary care clinic.  If you do not have a primary care provider, please call [phone] and they will assist you.      [employer] is an electronic gateway that provides easy, online access to your medical records. With [employer], you can request a clinic appointment, read your test results, renew a prescription or communicate with your care team.     To access your existing account, please contact your [facility] or call [phone] for assistance.        Care EveryWhere ID     This is your Care EveryWhere ID. This could be used by other organizations to access your [facility] medical records  [id number]        Your Vitals Were     Pulse Temperature Pulse Oximetry BMI (Body Mass Index)          65 97.7  F (36.5   C) (Oral) 96% 27.01 kg/m2         Blood Pressure from Last 3 Encounters:   03/21/18 128/77   03/01/18 115/80   03/01/18 115/80    Weight from Last 3 Encounters:   03/21/18 98 kg (216 lb 1.6 oz)   03/01/18 95.3 kg (210 lb)   03/01/18 95.3 kg (210 lb)              Today, you had the following     No orders found for display       Primary Care Provider Office Phone # Fax #    Lenin Bermudez [phone]       [facility] [address]  [place] [address]        Equal Access to Services     [facility]: Hadii aad ku hadasho Somonishaali, waaxda luqadaha, qaybta kaalmada adegeriyada, dai chamberlain . So [facility] [place] [phone].    ATENCIÓN: Si habla español, tiene a meza disposición servicios gratuitos de asistencia lingüística. [facility] [phone].    We comply with applicable federal civil rights laws and [place] laws. We do not discriminate on the basis of race, color, national origin, age, disability, sex, sexual orientation, or gender identity.            Thank you!     Thank you for choosing [facility]ARE CLINIC  for your care. Our goal is always to provide you with excellent care. Hearing back from our patients is one way we can continue to improve our services. Please take a few minutes to complete the written survey that you may receive in the mail after your visit with us. Thank you!             Your Updated Medication List - Protect others around you: Learn how to safely use, store and throw away your medicines at www.disposemymeds.org.          This list is accurate as of 3/21/18  5:06 PM.  Always use your most recent med list.                   Brand Name Dispense Instructions for use Diagnosis    aspirin 81 MG tablet    ASPIRIN LOW DOSE    30 tablet    Take 1 tablet (81 mg) by mouth daily    Paroxysmal atrial fibrillation (H)       meloxicam 15 MG tablet    MOBIC    30 tablet    Take 1 tablet (15 mg) by mouth daily    Radicular pain of  lumbosacral region, Lumbar degenerative disc disease       metoprolol succinate 25 MG 24 hr tablet    TOPROL XL    30 tablet    Take 1 tablet (25 mg) by mouth daily    Paroxysmal atrial fibrillation (H)       order for DME      Equipment being ordered: CPAPPatient Dimitris Carroll was set up at [place] on December 17, 2015. Patient received a Resmed AirSense 10 Auto. Pressures were set at Auto 7 - 12 cm H2O.   Patient?s ramp is 5 cm H2O for Auto and FLEX/EPR is A Flex.  Patient received a [employer] Mask name: SIMPLUS FFmask Size Large, heated tubing and heated humidifier.  Patient is enrolled in the STM Program and does need to meet compliance. Patient has a follow up on TBD with Sole Oquendo NP.  Hailey Perdomo        propafenone 300 MG tablet    RYTHMOL    20 tablet    Take 2 tablets (600 mg) by mouth once as needed For recurrence of atrial fibrillation. One dose per 24 hours as needed.    Paroxysmal atrial fibrillation (H)

## 2018-03-24 ENCOUNTER — OFFICE VISIT (OUTPATIENT)
Dept: INTERNAL MEDICINE | Facility: CLINIC | Age: 48
End: 2018-03-24
Payer: COMMERCIAL

## 2018-03-24 DIAGNOSIS — I48.91 ATRIAL FIBRILLATION, UNSPECIFIED TYPE (H): ICD-10-CM

## 2018-03-24 DIAGNOSIS — Z12.5 SCREENING FOR PROSTATE CANCER: Primary | ICD-10-CM

## 2018-03-24 NOTE — PROGRESS NOTES
HPI:    Pt. Comes in for follow today. He has h/o PAF seen in Cardiology 12/20/2017 and  metoprolol. He only takes propafenone if he has sxs. No current sxs. Seen by Dr. Mccracken, Urology 3/1/2018 and had unremarkable testicular U/S. Seen by rosario Man 3/1/2018  for back and L foot pain. He had some degenerative disc findings lumbar X-ray. No other HEENT, cardiopulmonary, abdominal, , neurological, systemic complaints. No skin complaints. He does not smoke nor abuse EtOH. He does exercise.     PE:    No vitals, gen nad cooperative, alert, HEENT oropharynx clear no exudate, neck supple nl rom, LCTA, B, RRR, S1, S2, no MRG, abdomen, benign, No tenderness to L foot. Good B tibialis pulses, no para-spinous tenderness to palpation. Neurological exam is normal.        A/P:      1. PAF he remains on current medications and no current sxs. He was seen in Cardiology 12/20/2017. He was discharged from Cardiology service 11/23/2017. He had RYDER 11/19/2017 with mild decreased EF 40-45%  2. L foot and back complaints. He has concerns regarding neuropathy. Seen by rosario Man. For now PT. If worse, X-ray foot, EMG and/or Lumbar MRI  3. He had colonoscopy several years ago and is not sure when he is due. May have had study at  Family Medicine?  4. Future labs fasting lipids and PSA ordered today  5. He saw Dr. Mccracken, Urology 3/1/2018 for testicular pain and had unremarkable testicular U/S 3/1/2018  6. MILAGROS he remains on CPAP. See sleep encounter note from 3/20/2018    I will see him back in about 3 months.     Total time spent 25 minutes.  More than 50% of the time spent with Mr. Carroll on counseling / coordinating his care

## 2018-03-24 NOTE — MR AVS SNAPSHOT
After Visit Summary   3/24/2018    Dimitris Carroll    MRN: [id number]           Patient Information     Date Of Birth          1970        Visit Information        Provider Department      3/24/2018 9:00 AM Herson Manjarrez MD [facility]        Today's Diagnoses     Screening for prostate cancer    -  1    Atrial fibrillation, unspecified type (H)           Follow-ups after your visit        Future tests that were ordered for you today     Open Future Orders        Priority Expected Expires Ordered    Comprehensive metabolic panel Routine 3/24/2018 4/7/2018 3/24/2018    CBC with platelets differential Routine 3/24/2018 4/7/2018 3/24/2018    Lipid panel reflex to direct LDL Fasting Routine  3/24/2019 3/24/2018    PSA screen Routine 3/24/2018 3/24/2019 3/24/2018            Who to contact     Please call your clinic at [phone] to:    Ask questions about your health    Make or cancel appointments    Discuss your medicines    Learn about your test results    Speak to your doctor            Additional Information About Your Visit        [employer] Information     [employer] gives you secure access to your electronic health record. If you see a primary care provider, you can also send messages to your care team and make appointments. If you have questions, please call your primary care clinic.  If you do not have a primary care provider, please call [phone] and they will assist you.      [employer] is an electronic gateway that provides easy, online access to your medical records. With [employer], you can request a clinic appointment, read your test results, renew a prescription or communicate with your care team.     To access your existing account, please contact your [facility] or call [phone] for assistance.        Care EveryWhere ID     This is your Care EveryWhere ID. This could be used by other organizations to access your [facility]  records  [id number]         Blood Pressure from Last 3 Encounters:   03/21/18 128/77   03/01/18 115/80   03/01/18 115/80    Weight from Last 3 Encounters:   03/21/18 98 kg (216 lb 1.6 oz)   03/01/18 95.3 kg (210 lb)   03/01/18 95.3 kg (210 lb)               Primary Care Provider Office Phone # Fax #    Lenin Bermudez [phone]       [facility] [address]  [facility] [address]        Equal Access to Services     [facility]: Hadii aad ku hadasho Soomaali, waaxda luqadaha, qaybta kaalmada adeegyada, dai patiño hayrosalie adegeri chamberlain . So [facility] [phone].    ATENCIÓN: Si habla español, tiene a meza disposición servicios gratuitos de asistencia lingüística. [facility] [phone].    We comply with applicable federal civil rights laws and [place] laws. We do not discriminate on the basis of race, color, national origin, age, disability, sex, sexual orientation, or gender identity.            Thank you!     Thank you for choosing [facility]  for your care. Our goal is always to provide you with excellent care. Hearing back from our patients is one way we can continue to improve our services. Please take a few minutes to complete the written survey that you may receive in the mail after your visit with us. Thank you!             Your Updated Medication List - Protect others around you: Learn how to safely use, store and throw away your medicines at www.disposemymeds.org.          This list is accurate as of 3/24/18  9:25 AM.  Always use your most recent med list.                   Brand Name Dispense Instructions for use Diagnosis    aspirin 81 MG tablet    ASPIRIN LOW DOSE    30 tablet    Take 1 tablet (81 mg) by mouth daily    Paroxysmal atrial fibrillation (H)       meloxicam 15 MG tablet    MOBIC    30 tablet    Take 1 tablet (15 mg) by mouth daily    Radicular pain of lumbosacral region, Lumbar degenerative disc disease       metoprolol succinate  25 MG 24 hr tablet    TOPROL XL    30 tablet    Take 1 tablet (25 mg) by mouth daily    Paroxysmal atrial fibrillation (H)       order for DME      Equipment being ordered: CPAPPatient Dimitris Carroll was set up at [place] on December 17, 2015. Patient received a Resmed AirSense 10 Auto. Pressures were set at Auto 7 - 12 cm H2O.   Patient?s ramp is 5 cm H2O for Auto and FLEX/EPR is A Flex.  Patient received a [employer] Mask name: SIMPLUS FFmask Size Large, heated tubing and heated humidifier.  Patient is enrolled in the STM Program and does need to meet compliance. Patient has a follow up on TBD with Sole Oquendo NP.  Hailey Perdomo        propafenone 300 MG tablet    RYTHMOL    20 tablet    Take 2 tablets (600 mg) by mouth once as needed For recurrence of atrial fibrillation. One dose per 24 hours as needed.    Paroxysmal atrial fibrillation (H)

## 2018-07-13 DIAGNOSIS — I48.0 PAROXYSMAL ATRIAL FIBRILLATION (H): ICD-10-CM

## 2018-07-16 RX ORDER — METOPROLOL SUCCINATE 25 MG/1
25 TABLET, EXTENDED RELEASE ORAL DAILY
Qty: 30 TABLET | Refills: 11 | Status: SHIPPED | OUTPATIENT
Start: 2018-07-16 | End: 2019-08-05

## 2018-10-28 ENCOUNTER — MYC MEDICAL ADVICE (OUTPATIENT)
Dept: SLEEP MEDICINE | Facility: CLINIC | Age: 48
End: 2018-10-28

## 2018-10-29 NOTE — TELEPHONE ENCOUNTER
From: Dimitris Carroll  To: Lynette Antoine MD  Sent: 10/28/2018 9:50 PM CDT  Subject: Question about an upcoming visit    Hello, I hope all of you are doing well. Can I come in soon for an appointment to try a nose version cpap attachment? I think I would like to switch, but I also would like to know if I am due for a change or if I would be charged for the change in equipment. Thank you, Lobito

## 2018-11-06 ENCOUNTER — MYC MEDICAL ADVICE (OUTPATIENT)
Dept: SLEEP MEDICINE | Facility: CLINIC | Age: 48
End: 2018-11-06

## 2018-11-06 ENCOUNTER — PATIENT OUTREACH (OUTPATIENT)
Dept: CARE COORDINATION | Facility: CLINIC | Age: 48
End: 2018-11-06

## 2018-11-07 NOTE — TELEPHONE ENCOUNTER
From: Dimitris Carroll  To: Lynette Antoine MD  Sent: 11/6/2018 7:04 AM CST  Subject: Question about an upcoming visit    Good Morning! Would you help me find filters for my CPAP machine? I'm not at home, and I forgot to look at the information on it, but I'm hoping you have it there. I've gone three nights without it, and I'd really like to get back on it tonight if possible. It is passed the point where I can use it for another night. I know - I need to change it more often. Thank you!  
Pt called and he states that he has found his filters.    ROSALBA Ramsey      
Yes

## 2018-11-09 ENCOUNTER — OFFICE VISIT (OUTPATIENT)
Dept: CARDIOLOGY | Facility: CLINIC | Age: 48
End: 2018-11-09
Attending: INTERNAL MEDICINE
Payer: COMMERCIAL

## 2018-11-09 VITALS
BODY MASS INDEX: 26.83 KG/M2 | HEIGHT: 75 IN | OXYGEN SATURATION: 95 % | WEIGHT: 215.8 LBS | SYSTOLIC BLOOD PRESSURE: 117 MMHG | DIASTOLIC BLOOD PRESSURE: 71 MMHG | HEART RATE: 64 BPM

## 2018-11-09 DIAGNOSIS — I48.91 ATRIAL FIBRILLATION, UNSPECIFIED TYPE (H): Primary | ICD-10-CM

## 2018-11-09 PROCEDURE — G0463 HOSPITAL OUTPT CLINIC VISIT: HCPCS | Mod: 25,ZF

## 2018-11-09 PROCEDURE — 93005 ELECTROCARDIOGRAM TRACING: CPT | Mod: ZF

## 2018-11-09 PROCEDURE — 93010 ELECTROCARDIOGRAM REPORT: CPT | Mod: ZP | Performed by: INTERNAL MEDICINE

## 2018-11-09 PROCEDURE — 99213 OFFICE O/P EST LOW 20 MIN: CPT | Mod: ZP | Performed by: INTERNAL MEDICINE

## 2018-11-09 ASSESSMENT — PAIN SCALES - GENERAL: PAINLEVEL: NO PAIN (0)

## 2018-11-09 NOTE — LETTER
11/9/2018      RE: Dimitris Carroll  [address]  [facility] [address]       Dear Colleague,    Thank you for the opportunity to participate in the care of your patient, Dimitris Carroll, at the [facility]. Please see a copy of my visit note below.    Electrophysiology Clinic Note  HPI:   Mr. Carroll is a 48 year old male who has a past medical history significant for PAF (CHADSVASC 0) s/p DCCV 10/2015 and 11/20/17, and MILAGROS (uses CPAP). He presents today for follow up.     He was first diagnosed with A.fib in October 2015 when he suddenly developed palpitations, dizziness, and dyspnea on 10/10/15. He presented to the urgent care and ultimately was sent to the ER on 10/12/15 where he was diagnosed with atrial fibrillation and underwent DCCV x4. The first 3 shocks (200 J) were followed by return of AF and he was subsequently loaded with 300 mg IV Amiodarone and cardioverted again which resulted in sustained NSR. His initial Echo showed LVEF 40-45%. He was started on Pradaxa, Metoprolol XL 25 mg,and a short course of amiodarone.He was also noted to have elevated TSH He was referred to EP for further management. He saw EP in 11/2015. His amiodarone had already been discontinued on 10/28/15. He denied any recurrence of atrial fibrillation. He had a CMRI done in November 2015 showed mild non-ischemic cardiomyopathy with LVEF of 53% and no DE. A stress echo done in December 2015 showed LVEF 55-60%. Cardiac event monitoring showed sinus throughout with rare PACs and PVCs. 9 triggered events all showed NSR some was PACs.  He underwent a sleep study which demonstrated sleep apnea for which he was started on home CPAP. He then had ER visit on 5/13/16 for AF with RVR. He was given 600 mg Rythmol and offered DCCV; however, patient declined. He was started on Xarelto with plan for outpatient DCCV if AF did not self convert. He did eventually convert and did  not require DCCV. He then stopped his Xarelto given no need for intervention.       He then presented to [facility] on 11/19/17 with his AF symptoms (palpitations, dizziness, and SANCHEZ). He reports onset of symptoms on 11/18/17 in the afternoon/evening. He was on a pill-in-the-pocket approach with Rythmol; however, he accidentally thought it was amiodarone and took an amiodarone pill. He took another amiodarone in the morning on 11/19/17 and when symptoms persisted he came into [facility] for evaluation. He was found in AF and was admitted for further management. He reported having more alcohol than usual this weekend about 5 drinks in a 6 hour period. He was started on Xarelto and underwent RYDER/DCCV on 11/20/17. He followed up with EP in 12/2017 and was doing well without any further AF episodes. He stopped Xarelto 1 month after his DCCV.   He presents today for follow up. He reports he has had to use his pill-in-the-pocket propafenone twice since last follow up. He took it in 1/2018 and 8/2018. Each time his symptoms abated and he did not come in for evaluation. His last RYDER did show LVEF 40-45% (in AF) and mild diffuse hypokinesis with mild MR and TR. He denies any chest pain/pressures, dizziness, lightheadedness, leg/ankle swelling, PND, orthopnea,or syncopal symptoms. Presenting 12 lead ECG shows unusual p wave axis Vent Rate 60 bpm,  ms, QRS 98 ms,  ms. Current cardiac medications include: Toprol XL, ASA, and Propafenone pill-in-the pocket.    PAST MEDICAL HISTORY:  Past Medical History:   Diagnosis Date     Atrial fibrillation (H)      Sleep apnea        CURRENT MEDICATIONS:  Current Outpatient Prescriptions   Medication Sig Dispense Refill     aspirin (ASPIRIN LOW DOSE) 81 MG tablet Take 1 tablet (81 mg) by mouth daily 30 tablet 11     metoprolol succinate (TOPROL XL) 25 MG 24 hr tablet Take 1 tablet (25 mg) by mouth daily 30 tablet 11     order for DME Equipment being ordered: CPAPPatient Dimitris Carroll was  "set up at [facility] on December 17, 2015. Patient received a Resmed AirSense 10 Auto. Pressures were set at Auto 7 - 12 cm H2O.   Patient s ramp is 5 cm H2O for Auto and FLEX/EPR is A Flex.  Patient received a [employer] Mask name: SIMPLUS FFmask Size Large, heated tubing and heated humidifier.  Patient is enrolled in the STM Program and does need to meet compliance. Patient has a follow up on TBD with Sole Oquendo NP.   Hailey Perdomo       propafenone (RYTHMOL) 300 MG tablet Take 2 tablets (600 mg) by mouth once as needed For recurrence of atrial fibrillation. One dose per 24 hours as needed. 20 tablet 0     meloxicam (MOBIC) 15 MG tablet Take 1 tablet (15 mg) by mouth daily (Patient not taking: Reported on 11/9/2018) 30 tablet 1       PAST SURGICAL HISTORY:  Past Surgical History:   Procedure Laterality Date     ANESTHESIA CARDIOVERSION N/A 11/20/2017    Procedure: ANESTHESIA CARDIOVERSION;  Cardioversion ;  Surgeon: GENERIC ANESTHESIA PROVIDER;  Location:  OR       ALLERGIES:     Allergies   Allergen Reactions     Milk Protein Extract GI Disturbance     Wheat Gluten [Gluten Meal] GI Disturbance       FAMILY HISTORY:  No family history on file.    SOCIAL HISTORY:  Social History   Substance Use Topics     Smoking status: Never Smoker     Smokeless tobacco: Never Used     Alcohol use No       ROS:   A comprehensive 10 point review of systems negative other than as mentioned in HPI.  Exam:  /71 (BP Location: Right arm, Patient Position: Chair, Cuff Size: Adult Large)  Pulse 64  Ht 1.905 m (6' 3\")  Wt 97.9 kg (215 lb 12.8 oz)  SpO2 95%  BMI 26.97 kg/m2  GENERAL APPEARANCE: alert and no distress  HEENT: no icterus, no xanthelasmas, normal pupil size and reaction, normal palate, mucosa moist, no central cyanosis  NECK: no adenopathy, no asymmetry, masses, or scars, thyroid normal to palpation and no bruits, JVP not elevated  RESPIRATORY: lungs clear to auscultation - no rales, rhonchi or wheezes, " no use of accessory muscles, no retractions, respirations are unlabored, normal respiratory rate  CARDIOVASCULAR: regular rhythm, normal S1 with physiologic split S2, no S3 or S4 and no murmur, click or rub, precordium quiet with normal PMI.  ABDOMEN: soft, non tender, bowel sounds normal, non-distended  EXTREMITIES: peripheral pulses normal, no edema  NEURO: alert and oriented to person/place/time, normal speech, gait and affect  SKIN: no ecchymoses, no rashes  PSYCH: normal affect, cooperative    Labs:  Reviewed.     Testing/Procedures:    12/2015 STRESS ECHOCARDIOGRAM:   Interpretation Summary  There is 1 mm ST segment depression in the Inferior leads.  No stress induced regional wall motion abnormalities. Normal resting LV  function with EF of approximately 60-65%; normal response to exercise with  increase to approximately 70-75%. Normal functional capacity.  ______________________________________________________________________________     Stress  Patient was given 4ml mixture of 1.5ml Definity and 8.5ml saline.  There is 1 mm ST segment depression in the Inferior leads.  Limiting Sympton: fatigue.  Peak MVO2 33 ml/kg/min .  Percent predicted MVO2 112 %.  RPP 01631.  The maximum dose of atropine was 1.2mg.  Maximum workload 225 powers.  Target Heart Rate was achieved.  Exercise was stopped due to fatigue.  The patient did not exhibit any symptoms during exercise.  Normal blood pressure response with stress.  Normal heart rate response to exercise.     Rest  The baseline ECG displays normal sinus rhythm.     Stress Results                                       Maximum Predicted HR:  173 bpm            Target HR: 147 bpm        % Maximum Predicted HR:  89 %                      +--------+--------+----------+------+----+                   :  Stage :Duration:Heart Rate:  BPDos   e:                   :        : (mm:ss):   (bpm)  :      :    :                   +--------+--------+----------+------+----+                    :BASELINE:  0:00 55          :102/71:0.00:                   +--------+--------+----------+------+----+                   :  PEAK  :  15:23 1     54   :174/91:1.20:                   +--------+--------+----------+------+----+                        Stress Duration:  15:21 mm:ss *                    Maximum Stress HR:   154 bpm *        Left Ventricle  Left ventricular systolic function is normal.  Aortic Valve  The aortic valve is normal in structure and function.     Mitral Valve  The mitral valve is normal in structure and function.     Tricuspid Valve  The tricuspid valve is normal in structure and function.     Right Ventricle  The right ventricular systolic function is normal.     Pericardium  There is no pericardial effusion.      Assessment and Plan:   Mr. Carroll is a 48 year old male who has a past medical history significant for PAF (CHADSVASC 0) s/p DCCV 10/2015 and 2017 and MILAGROS (uses CPAP). He presents today for follow up. He reports he has had to use his pill-in-the-pocket propafenone twice since last follow up. He took it in 2018 and 2018. Each time his symptoms abated and he did not come in for evaluation. His last RYDER did show LVEF 40-45% (in AF) and mild diffuse hypokinesis with mild MR and TR.   Paroxysmal Atrial Fibrillation:   We discussed in detail with the patient management/treatment options for Kenji includin. Stroke Prophylaxis:  CHADSVASC=  0, corresponding to a 0.2-0.5% annual stroke / [facility] event rate. indicating no need for long term oral anticoagulation. He is on ASA only.   2. Rate Control: Continue Toprol XL.   3. Rhythm Control: Cardioversion, Antiarrhythmics and/or ablation are options for rhythm control. He had DCCV in 10/2015, recurrence in 2016 and was given Rythmol and eventually converted without DCCV. Then DCCV 17. He has been instructed to use Rythmol as pill-in-the-pocket for any AF.  He takes Propafenone 600 mg at onset of AF symptoms.   He has been instructed to call if he remains in AF for 24 hours or come in for evaluation immediately if he is unwell.   4. Risk Factor Management: maintain tight BP control and continued MILAGROS treatment (he is requesting a new mask which we will help arrange for him).    Given his last echo in AF showed some mildly reduced systemic function, we will have him get an updated echo now.  Follow up in 1 year.       The patient states understanding and is agreeable with plan.   This patient was seen and examined with Dr. Stern. The above note reflects our joint assessment and plan.   ARIANE Whitfield CNP  Pager: [phone]    EP STAFF NOTE  I have seen and examined the patient as part of a shared visit with ALL Whitfield NP.  I agree with the note above. I reviewed today's vital signs and medications. I have reviewed and discussed with the advanced practice provider their physical examination, assessment, and plan   Briefly, PAF low burden, pill in the pocket  My key history/exam findings are: RRR.   The key management decisions made by me: repeat Echo, RYDER underestimated EF (last done in 2017), f/u 1 year.    Calvin Stern MD [facility]  Cardiology - Electrophysiology

## 2018-11-09 NOTE — MR AVS SNAPSHOT
After Visit Summary   11/9/2018    Dimitris Carroll    MRN: [id number]           Patient Information     Date Of Birth          1970        Visit Information        Provider Department      11/9/2018 2:00 PM Calvin Setrn MD [facility]        Today's Diagnoses     Atrial fibrillation, unspecified type (H)    -  1      Care Instructions    Cardiology Provider you saw in clinic today: Dr. Stern      Labs/Tests needed:  Echocardiogram     Follow-up Visit:  1 year with Ami Jones NP           Please contact us via [employer] for questions or concerns.    Dora Granados RN   Electrophysiology Nurse Coordinator.  [phone]    If your question concerns the schedule/appointment times, contact:  [facility]   [phone]    Device Clinic (Pacemakers, ICD, Loop Recorders)   [phone]      You will receive all normal lab and testing results via [employer] or mail if not reviewed in clinic today.   If you need a medication refill please contact your pharmacy.    As always, thank you for trusting us with your health care needs!            Follow-ups after your visit        Additional Services     Follow-Up with Cardiac Advanced Practice Provider       Ami jones                  Your next 10 appointments already scheduled     Nov 14, 2018 10:00 AM CST   Return Sleep Patient with Lynette Antoine MD   [facility] ([facility])    [address] [place] [id number] [phone]            Dec 03, 2018  8:30 AM CST   Ech Complete with [address] Echo ([facility])    [address] [address] [phone]           1.  Please bring or wear a comfortable two-piece outfit. 2.  You may eat, drink and take your normal medicines. 3.  For any questions that cannot be answered, please contact the ordering  "physician 4.  Please do not wear perfumes or scented lotions on the day of your exam.              Future tests that were ordered for you today     Open Future Orders        Priority Expected Expires Ordered    Follow-Up with Cardiac Advanced Practice Provider Routine 11/9/2019 11/9/2019 11/9/2018    Echocardiogram Routine 11/28/2018 11/9/2019 11/9/2018            Who to contact     If you have questions or need follow up information about today's clinic visit or your schedule please contact [facility] directly at [phone].  Normal or non-critical lab and imaging results will be communicated to you by [employer], letter or phone within 4 business days after the clinic has received the results. If you do not hear from us within 7 days, please contact the clinic through [employer] or phone. If you have a critical or abnormal lab result, we will notify you by phone as soon as possible.  Submit refill requests through [employer] or call your pharmacy and they will forward the refill request to us. Please allow 3 business days for your refill to be completed.          Additional Information About Your Visit        [employer] Information     [employer] gives you secure access to your electronic health record. If you see a primary care provider, you can also send messages to your care team and make appointments. If you have questions, please call your primary care clinic.  If you do not have a primary care provider, please call [phone] and they will assist you.        Care EveryWhere ID     This is your Care EveryWhere ID. This could be used by other organizations to access your [place] medical records  [id number]        Your Vitals Were     Pulse Height Pulse Oximetry BMI (Body Mass Index)          64 1.905 m (6' 3\") 95% 26.97 kg/m2         Blood Pressure from Last 3 Encounters:   11/09/18 117/71   03/21/18 128/77   03/01/18 115/80    Weight from Last 3 Encounters:   11/09/18 97.9 kg (215 lb 12.8 oz)   03/21/18 98 " kg (216 lb 1.6 oz)   03/01/18 95.3 kg (210 lb)              We Performed the Following     EKG 12-lead, tracing only (Same Day)        Primary Care Provider Office Phone # Fax #    Lenin Bermudez [phone]       [facility] PHYS [address]  [place] [address]        Equal Access to Services     [facility]: Hadii aad ku hadasho Soomaali, waaxda luqadaha, qaybta kaalmada adeegyada, waxay idiin hayaan adeeg kharash la'aan . So [facility] [phone].    ATENCIÓN: Si habla espdelfino, tiene a meza disposición servicios gratuitos de asistencia lingüística. Blakeame al [phone].    We comply with applicable federal civil rights laws and [place] laws. We do not discriminate on the basis of race, color, national origin, age, disability, sex, sexual orientation, or gender identity.            Thank you!     Thank you for choosing [facility]  for your care. Our goal is always to provide you with excellent care. Hearing back from our patients is one way we can continue to improve our services. Please take a few minutes to complete the written survey that you may receive in the mail after your visit with us. Thank you!             Your Updated Medication List - Protect others around you: Learn how to safely use, store and throw away your medicines at www.disposemymeds.org.          This list is accurate as of 11/9/18  2:02 PM.  Always use your most recent med list.                   Brand Name Dispense Instructions for use Diagnosis    aspirin 81 MG tablet    ASPIRIN LOW DOSE    30 tablet    Take 1 tablet (81 mg) by mouth daily    Paroxysmal atrial fibrillation (H)       meloxicam 15 MG tablet    MOBIC    30 tablet    Take 1 tablet (15 mg) by mouth daily    Radicular pain of lumbosacral region, Lumbar degenerative disc disease       metoprolol succinate 25 MG 24 hr tablet    TOPROL XL    30 tablet    Take 1 tablet (25 mg) by mouth daily    Paroxysmal atrial fibrillation (H)        order for DME      Equipment being ordered: CPAPPatient Dimitris Carroll was set up at [facility] on December 17, 2015. Patient received a Resmed AirSense 10 Auto. Pressures were set at Auto 7 - 12 cm H2O.   Patient?s ramp is 5 cm H2O for Auto and FLEX/EPR is A Flex.  Patient received a [employer] Mask name: SIMPLUS FFmask Size Large, heated tubing and heated humidifier.  Patient is enrolled in the STM Program and does need to meet compliance. Patient has a follow up on TBD with Sole Oquendo NP.  Hailey Perdomo        propafenone 300 MG tablet    RYTHMOL    20 tablet    Take 2 tablets (600 mg) by mouth once as needed For recurrence of atrial fibrillation. One dose per 24 hours as needed.    Paroxysmal atrial fibrillation (H)

## 2018-11-09 NOTE — PATIENT INSTRUCTIONS
Cardiology Provider you saw in clinic today: Dr. Stern      Labs/Tests needed:  Echocardiogram     Follow-up Visit:  1 year with Ami Jones NP           Please contact us via [email or web address] for questions or concerns.    Dora Granados RN   Electrophysiology Nurse Coordinator.  [phone]    If your question concerns the schedule/appointment times, contact:  [facility]   [phone]    Device Clinic (Pacemakers, ICD, Loop Recorders)   [phone]      You will receive all normal lab and testing results via [email or web address] or mail if not reviewed in clinic today.   If you need a medication refill please contact your pharmacy.    As always, thank you for trusting us with your health care needs!

## 2018-11-09 NOTE — PROGRESS NOTES
Electrophysiology Clinic Note  HPI:   Mr. Carroll is a 48 year old male who has a past medical history significant for PAF (CHADSVASC 0) s/p DCCV 10/2015 and 11/20/17, and MILAGROS (uses CPAP). He presents today for follow up.     He was first diagnosed with A.fib in October 2015 when he suddenly developed palpitations, dizziness, and dyspnea on 10/10/15. He presented to the urgent care and ultimately was sent to the ER on 10/12/15 where he was diagnosed with atrial fibrillation and underwent DCCV x4. The first 3 shocks (200 J) were followed by return of AF and he was subsequently loaded with 300 mg IV Amiodarone and cardioverted again which resulted in sustained NSR. His initial Echo showed LVEF 40-45%. He was started on Pradaxa, Metoprolol XL 25 mg,and a short course of amiodarone.He was also noted to have elevated TSH He was referred to EP for further management. He saw EP in 11/2015. His amiodarone had already been discontinued on 10/28/15. He denied any recurrence of atrial fibrillation. He had a CMRI done in November 2015 showed mild non-ischemic cardiomyopathy with LVEF of 53% and no DE. A stress echo done in December 2015 showed LVEF 55-60%. Cardiac event monitoring showed sinus throughout with rare PACs and PVCs. 9 triggered events all showed NSR some was PACs.  He underwent a sleep study which demonstrated sleep apnea for which he was started on home CPAP. He then had ER visit on 5/13/16 for AF with RVR. He was given 600 mg Rythmol and offered DCCV; however, patient declined. He was started on Xarelto with plan for outpatient DCCV if AF did not self convert. He did eventually convert and did not require DCCV. He then stopped his Xarelto given no need for intervention.       He then presented to [facility] on 11/19/17 with his AF symptoms (palpitations, dizziness, and SANCHEZ). He reports onset of symptoms on 11/18/17 in the afternoon/evening. He was on a pill-in-the-pocket approach with Rythmol; however, he accidentally  thought it was amiodarone and took an amiodarone pill. He took another amiodarone in the morning on 11/19/17 and when symptoms persisted he came into [facility] for evaluation. He was found in AF and was admitted for further management. He reported having more alcohol than usual this weekend about 5 drinks in a 6 hour period. He was started on Xarelto and underwent RYDER/DCCV on 11/20/17. He followed up with EP in 12/2017 and was doing well without any further AF episodes. He stopped Xarelto 1 month after his DCCV.   He presents today for follow up. He reports he has had to use his pill-in-the-pocket propafenone twice since last follow up. He took it in 1/2018 and 8/2018. Each time his symptoms abated and he did not come in for evaluation. His last RYDER did show LVEF 40-45% (in AF) and mild diffuse hypokinesis with mild MR and TR. He denies any chest pain/pressures, dizziness, lightheadedness, leg/ankle swelling, PND, orthopnea,or syncopal symptoms. Presenting 12 lead ECG shows unusual p wave axis Vent Rate 60 bpm,  ms, QRS 98 ms,  ms. Current cardiac medications include: Toprol XL, ASA, and Propafenone pill-in-the pocket.    PAST MEDICAL HISTORY:  Past Medical History:   Diagnosis Date     Atrial fibrillation (H)      Sleep apnea        CURRENT MEDICATIONS:  Current Outpatient Prescriptions   Medication Sig Dispense Refill     aspirin (ASPIRIN LOW DOSE) 81 MG tablet Take 1 tablet (81 mg) by mouth daily 30 tablet 11     metoprolol succinate (TOPROL XL) 25 MG 24 hr tablet Take 1 tablet (25 mg) by mouth daily 30 tablet 11     order for DME Equipment being ordered: CPAPPatient Dimitris Carroll was set up at [place] on December 17, 2015. Patient received a Resmed AirSense 10 Auto. Pressures were set at Auto 7 - 12 cm H2O.   Patient s ramp is 5 cm H2O for Auto and FLEX/EPR is A Flex.  Patient received a [employer] IMGuest Mask name: SIMPLUS FFmask Size Large, heated tubing and heated humidifier.  Patient is enrolled  "in the STM Program and does need to meet compliance. Patient has a follow up on TBD with Sole Oquendo NP.   Hailey Perdomo       propafenone (RYTHMOL) 300 MG tablet Take 2 tablets (600 mg) by mouth once as needed For recurrence of atrial fibrillation. One dose per 24 hours as needed. 20 tablet 0     meloxicam (MOBIC) 15 MG tablet Take 1 tablet (15 mg) by mouth daily (Patient not taking: Reported on 11/9/2018) 30 tablet 1       PAST SURGICAL HISTORY:  Past Surgical History:   Procedure Laterality Date     ANESTHESIA CARDIOVERSION N/A 11/20/2017    Procedure: ANESTHESIA CARDIOVERSION;  Cardioversion ;  Surgeon: GENERIC ANESTHESIA PROVIDER;  Location:  OR       ALLERGIES:     Allergies   Allergen Reactions     Milk Protein Extract GI Disturbance     Wheat Gluten [Gluten Meal] GI Disturbance       FAMILY HISTORY:  No family history on file.    SOCIAL HISTORY:  Social History   Substance Use Topics     Smoking status: Never Smoker     Smokeless tobacco: Never Used     Alcohol use No       ROS:   A comprehensive 10 point review of systems negative other than as mentioned in HPI.  Exam:  /71 (BP Location: Right arm, Patient Position: Chair, Cuff Size: Adult Large)  Pulse 64  Ht 1.905 m (6' 3\")  Wt 97.9 kg (215 lb 12.8 oz)  SpO2 95%  BMI 26.97 kg/m2  GENERAL APPEARANCE: alert and no distress  HEENT: no icterus, no xanthelasmas, normal pupil size and reaction, normal palate, mucosa moist, no central cyanosis  NECK: no adenopathy, no asymmetry, masses, or scars, thyroid normal to palpation and no bruits, JVP not elevated  RESPIRATORY: lungs clear to auscultation - no rales, rhonchi or wheezes, no use of accessory muscles, no retractions, respirations are unlabored, normal respiratory rate  CARDIOVASCULAR: regular rhythm, normal S1 with physiologic split S2, no S3 or S4 and no murmur, click or rub, precordium quiet with normal PMI.  ABDOMEN: soft, non tender, bowel sounds normal, non-distended  EXTREMITIES: " peripheral pulses normal, no edema  NEURO: alert and oriented to person/place/time, normal speech, gait and affect  SKIN: no ecchymoses, no rashes  PSYCH: normal affect, cooperative    Labs:  Reviewed.     Testing/Procedures:    12/2015 STRESS ECHOCARDIOGRAM:   Interpretation Summary  There is 1 mm ST segment depression in the Inferior leads.  No stress induced regional wall motion abnormalities. Normal resting LV  function with EF of approximately 60-65%; normal response to exercise with  increase to approximately 70-75%. Normal functional capacity.  ______________________________________________________________________________     Stress  Patient was given 4ml mixture of 1.5ml Definity and 8.5ml saline.  There is 1 mm ST segment depression in the Inferior leads.  Limiting Sympton: fatigue.  Peak MVO2 33 ml/kg/min .  Percent predicted MVO2 112 %.  RPP 35900.  The maximum dose of atropine was 1.2mg.  Maximum workload 225 opwers.  Target Heart Rate was achieved.  Exercise was stopped due to fatigue.  The patient did not exhibit any symptoms during exercise.  Normal blood pressure response with stress.  Normal heart rate response to exercise.     Rest  The baseline ECG displays normal sinus rhythm.     Stress Results                                       Maximum Predicted HR:  173 bpm            Target HR: 147 bpm        % Maximum Predicted HR:  89 %                      +--------+--------+----------+------+----+                   :  Stage :Duration:Heart Rate:  BPDos   e:                   :        : (mm:ss):   (bpm)  :      :    :                   +--------+--------+----------+------+----+                   :BASELINE:  0:00 55          :102/71:0.00:                   +--------+--------+----------+------+----+                   :  PEAK  :  15:23 1     54   :174/91:1.20:                   +--------+--------+----------+------+----+                        Stress Duration:  15:21 mm:ss *                    Maximum Stress  HR:   154 bpm *        Left Ventricle  Left ventricular systolic function is normal.  Aortic Valve  The aortic valve is normal in structure and function.     Mitral Valve  The mitral valve is normal in structure and function.     Tricuspid Valve  The tricuspid valve is normal in structure and function.     Right Ventricle  The right ventricular systolic function is normal.     Pericardium  There is no pericardial effusion.      Assessment and Plan:   Mr. Carroll is a 48 year old male who has a past medical history significant for PAF (CHADSVASC 0) s/p DCCV 10/2015 and 2017 and MILAGROS (uses CPAP). He presents today for follow up. He reports he has had to use his pill-in-the-pocket propafenone twice since last follow up. He took it in 2018 and 2018. Each time his symptoms abated and he did not come in for evaluation. His last RYDER did show LVEF 40-45% (in AF) and mild diffuse hypokinesis with mild MR and TR.   Paroxysmal Atrial Fibrillation:   We discussed in detail with the patient management/treatment options for Kenji includin. Stroke Prophylaxis:  CHADSVASC=  0, corresponding to a 0.2-0.5% annual stroke / systemic emolism event rate. indicating no need for long term oral anticoagulation. He is on ASA only.   2. Rate Control: Continue Toprol XL.   3. Rhythm Control: Cardioversion, Antiarrhythmics and/or ablation are options for rhythm control. He had DCCV in 10/2015, recurrence in 2016 and was given Rythmol and eventually converted without DCCV. Then DCCV 17. He has been instructed to use Rythmol as pill-in-the-pocket for any AF.  He takes Propafenone 600 mg at onset of AF symptoms.  He has been instructed to call if he remains in AF for 24 hours or come in for evaluation immediately if he is unwell.   4. Risk Factor Management: maintain tight BP control and continued MILAGROS treatment (he is requesting a new mask which we will help arrange for him).    Given his last echo in AF showed some mildly  reduced systemic function, we will have him get an updated echo now.  Follow up in 1 year.       The patient states understanding and is agreeable with plan.   This patient was seen and examined with Dr. Stern. The above note reflects our joint assessment and plan.   ARIANE Whitfield CNP  Pager: [phone]    EP STAFF NOTE  I have seen and examined the patient as part of a shared visit with ALL Whitfield NP.  I agree with the note above. I reviewed today's vital signs and medications. I have reviewed and discussed with the advanced practice provider their physical examination, assessment, and plan   Briefly, PAF low burden, pill in the pocket  My key history/exam findings are: RRR.   The key management decisions made by me: repeat Echo, RYDER underestimated EF (last done in 2017), f/u 1 year.    Calvin Stern MD [facility]  Cardiology - Electrophysiology

## 2018-11-09 NOTE — NURSING NOTE
Vitals completed successfully and medication reconciled. EKG done. Charlotte Powers MA  Chief Complaint   Patient presents with     Follow Up For     1 yr follow-up AF

## 2018-11-10 LAB — INTERPRETATION ECG - MUSE: NORMAL

## 2018-11-12 ENCOUNTER — MYC MEDICAL ADVICE (OUTPATIENT)
Dept: SLEEP MEDICINE | Facility: CLINIC | Age: 48
End: 2018-11-12

## 2018-11-13 ENCOUNTER — MYC MEDICAL ADVICE (OUTPATIENT)
Dept: SLEEP MEDICINE | Facility: CLINIC | Age: 48
End: 2018-11-13

## 2018-11-13 NOTE — TELEPHONE ENCOUNTER
From: Dimitris Carroll  To: Lynette Antoine MD  Sent: 11/12/2018 7:46 AM CST  Subject: Question about an upcoming visit    I'm realizing that my interest in an appointment is for a conversation about my CPAP. I don't need to see Dr. Burns, and the time that we scheduled the appointment does not work. Could I see someone about my CPAP at another time this week? Thank you!

## 2018-11-14 NOTE — TELEPHONE ENCOUNTER
From: Dimitris Carroll  To: Lynette Antoine MD  Sent: 11/13/2018 9:59 PM CST  Subject: Question about an upcoming visit    Hi, I realize that I can reschedule via My Chart or by phone. My question is how I can set up an appointment with a person regarding my CPAP machine. I do not need to see Dr. Burns. One of the people who works in that area gave me the impression that I can connect directly with them on matters dealing with my CPAP machine. Would you send me a number for them? Thank you for your help canceling tomorrow's appointment and connecting with folks [facility] [phone]

## 2018-12-03 ENCOUNTER — RADIANT APPOINTMENT (OUTPATIENT)
Dept: CARDIOLOGY | Facility: CLINIC | Age: 48
End: 2018-12-03
Payer: COMMERCIAL

## 2018-12-03 VITALS — DIASTOLIC BLOOD PRESSURE: 78 MMHG | SYSTOLIC BLOOD PRESSURE: 119 MMHG

## 2018-12-03 DIAGNOSIS — I48.91 ATRIAL FIBRILLATION, UNSPECIFIED TYPE (H): ICD-10-CM

## 2019-01-16 ENCOUNTER — OFFICE VISIT (OUTPATIENT)
Dept: SLEEP MEDICINE | Facility: CLINIC | Age: 49
End: 2019-01-16
Payer: COMMERCIAL

## 2019-01-16 ENCOUNTER — DOCUMENTATION ONLY (OUTPATIENT)
Dept: SLEEP MEDICINE | Facility: CLINIC | Age: 49
End: 2019-01-16
Payer: COMMERCIAL

## 2019-01-16 VITALS
RESPIRATION RATE: 16 BRPM | HEART RATE: 58 BPM | SYSTOLIC BLOOD PRESSURE: 119 MMHG | WEIGHT: 218 LBS | OXYGEN SATURATION: 96 % | DIASTOLIC BLOOD PRESSURE: 75 MMHG | BODY MASS INDEX: 27.1 KG/M2 | HEIGHT: 75 IN

## 2019-01-16 DIAGNOSIS — G47.33 OSA ON CPAP: Primary | ICD-10-CM

## 2019-01-16 DIAGNOSIS — G47.33 OSA (OBSTRUCTIVE SLEEP APNEA): Primary | ICD-10-CM

## 2019-01-16 PROCEDURE — 99214 OFFICE O/P EST MOD 30 MIN: CPT | Performed by: INTERNAL MEDICINE

## 2019-01-16 ASSESSMENT — MIFFLIN-ST. JEOR: SCORE: 1934.47

## 2019-01-16 NOTE — PROGRESS NOTES
Patient was seen with Dr. Burns here in [place] today 01/16/19. After he saw the doctor he wanted to want to try a nasal mask. I asked him since he has a full face is there something wrong with it and because he's always had a full face mask and is a mouth breather I recommend him staying with the full face. But I can have him try on a different full face mask or he can try on the nasal mask to see how he feels and if anything he may need a chinstrap. Patient stated that his mask is making too much noise. His wife and kids are complaining about it. I let him know it can be because he has not had a mask since 2017. Most likely it is old so it won't give him a seal. Patient decided that he will stick to the same full face mask. I let him know we do honor a one time 30 day mask exchange so if the new full face mask is still making noise to call and we can exchange the mask for him. I adjust the new [employer] Simplus full face size large cushion for him and he was given instructions of how to adjust the mask as well. He was give new tubing, water chamber, filters, and the form of when he can get new pap supplies. Patient also signed up for the auto calls. I advise him to change out the supplies when he can. Patient will call if he has any questions.

## 2019-01-17 NOTE — PROGRESS NOTES
"SLEEP CLINIC FOLLOW UP VISIT    Date  of viist: 1/16/19    Purpose of visit: Follow-up of MILAGROS    History of present illness: Mr. Carroll is a 48 year old male who has a past medical history significant for PAF , s/p DCCV 10/2015 and 11/20/17, and MILAGROS.    He is currently being treated with the CPAP device with fixed pressure setting at 12 cm of water.  He reports using the CPAP regularly during sleep and with the CPAP there have not been any reports of snoring or awakenings due to gasping for air or choking.  He denies fatigue but reports excessive daytime sleepiness which he attributes to insufficient sleep duration.  He denies any drowsiness while driving.    Downloadable compliance data for the past 1 month shows that he has used the device for 27 out of 30 days with an average duration of usage of 4 hours and 35 minutes on the days used.  95th percentile for air leak was 44.7 L/min.  Residual AHI was 0.6/h.    He reports insufficient sleep duration.  Bedtime between 1030 to 11:30 PM and wakeup time around 4:50 AM though sometimes he has been able to fall back asleep and waking up at 6AM.      Current meds, Past medical history, Past surgical history, Allergies, Social history, Family history: reviewed, per EMR    Exam:  /75   Pulse 58   Resp 16   Ht 1.905 m (6' 3\")   Wt 98.9 kg (218 lb)   SpO2 96%   BMI 27.25 kg/m    General appearance: Well-developed, well-nourished, in no apparent distress  Pt is dressed casually, cooperative with good eye contact.   CARDIOVASCULAR: regular rhythm, normal S1 with physiologic split S2, no S3 or S4 and no murmur  RESPIRATORY: lungs clear to auscultation - no rales, rhonchi or wheezes, no use of accessory muscles  Speech is spontaneous with regular rate and volume.   Mood: euthymic; affect congruent with full range and intensity.   Sensorium: awake, alert and oriented to person, place, time, and situation.      Assessment/plan:  1. MILAGROS: Patient reports using the CPAP " "device regularly and based on the compliance download MILAGROS is adequately controlled with the CPAP at the current pressure setting.  Prescription was provided for renewal of all the CPAP supplies and instructed to get the supplies for the device replaced regularly.  I have also provided him with a prescription for travel auto CPAP unit, with minimum and maximum pressure settings both at 12 cm of water . We discussed the association of MILAGROS with atrial fibrillation and other cardiovascular comorbidities.    2.  Chronic insufficient sleep: We discussed the consequences of chronic insufficient sleep including the effects of cardiovascular disease and recommended him to increase his sleep duration to  7-8 hours per night.    3.  Paroxysmal atrial fibrillation: He was recently evaluated at the cardiology clinic in November 2018.  Recommended continue to follow-up with cardiology.    Patient was counseled on the importance of driving while alert, avoiding driving if drowsy or sleepy and to pull over if drowsy.  Encouraged eating healthy and exercising regularly.    He will follow-up at the sleep clinic in 1 year or sooner if any concerns.      The above note was dictated using voice recognition software. Although reviewed after completion, some word and grammatical error may remain . Please contact the author for any clarifications.    \"I spent a total of 25 minutes face to face with Dimitris Carroll during today's office visit. Over 50% of this time was spent counseling the patient and  coordinating care regarding sleep apnea, CPAP treatment, increasing sleep duration, atrial fibrillation\"       Lynette Antoine MD   of Medicine,  Division of Pulmonary/Sleep Medicine  [facility].      "

## 2019-04-18 ENCOUNTER — APPOINTMENT (OUTPATIENT)
Dept: NUCLEAR MEDICINE | Facility: CLINIC | Age: 49
End: 2019-04-18
Attending: NURSE PRACTITIONER
Payer: COMMERCIAL

## 2019-04-18 ENCOUNTER — HOSPITAL ENCOUNTER (OUTPATIENT)
Facility: CLINIC | Age: 49
Setting detail: OBSERVATION
Discharge: HOME | End: 2019-04-18
Attending: EMERGENCY MEDICINE | Admitting: EMERGENCY MEDICINE
Payer: COMMERCIAL

## 2019-04-18 ENCOUNTER — APPOINTMENT (OUTPATIENT)
Dept: GENERAL RADIOLOGY | Facility: CLINIC | Age: 49
End: 2019-04-18
Attending: EMERGENCY MEDICINE
Payer: COMMERCIAL

## 2019-04-18 ENCOUNTER — APPOINTMENT (OUTPATIENT)
Dept: CARDIOLOGY | Facility: CLINIC | Age: 49
End: 2019-04-18
Attending: NURSE PRACTITIONER
Payer: COMMERCIAL

## 2019-04-18 VITALS
HEIGHT: 75 IN | DIASTOLIC BLOOD PRESSURE: 85 MMHG | RESPIRATION RATE: 18 BRPM | HEART RATE: 56 BPM | BODY MASS INDEX: 27.25 KG/M2 | TEMPERATURE: 97.7 F | SYSTOLIC BLOOD PRESSURE: 119 MMHG | OXYGEN SATURATION: 99 %

## 2019-04-18 DIAGNOSIS — R07.9 LEFT SIDED CHEST PAIN: ICD-10-CM

## 2019-04-18 LAB
ANION GAP SERPL CALCULATED.3IONS-SCNC: 7 MMOL/L (ref 3–14)
BASOPHILS # BLD AUTO: 0 10E9/L (ref 0–0.2)
BASOPHILS NFR BLD AUTO: 0.5 %
BUN SERPL-MCNC: 14 MG/DL (ref 7–30)
CALCIUM SERPL-MCNC: 8.2 MG/DL (ref 8.5–10.1)
CHLORIDE SERPL-SCNC: 105 MMOL/L (ref 94–109)
CO2 SERPL-SCNC: 27 MMOL/L (ref 20–32)
CREAT SERPL-MCNC: 1.03 MG/DL (ref 0.66–1.25)
DIFFERENTIAL METHOD BLD: NORMAL
EOSINOPHIL # BLD AUTO: 0.2 10E9/L (ref 0–0.7)
EOSINOPHIL NFR BLD AUTO: 2.7 %
ERYTHROCYTE [DISTWIDTH] IN BLOOD BY AUTOMATED COUNT: 12.4 % (ref 10–15)
GFR SERPL CREATININE-BSD FRML MDRD: 84 ML/MIN/{1.73_M2}
GLUCOSE SERPL-MCNC: 106 MG/DL (ref 70–99)
HCT VFR BLD AUTO: 47.6 % (ref 40–53)
HGB BLD-MCNC: 15.6 G/DL (ref 13.3–17.7)
IMM GRANULOCYTES # BLD: 0 10E9/L (ref 0–0.4)
IMM GRANULOCYTES NFR BLD: 0.2 %
INTERPRETATION ECG - MUSE: NORMAL
LYMPHOCYTES # BLD AUTO: 2.4 10E9/L (ref 0.8–5.3)
LYMPHOCYTES NFR BLD AUTO: 37.8 %
MCH RBC QN AUTO: 31.1 PG (ref 26.5–33)
MCHC RBC AUTO-ENTMCNC: 32.8 G/DL (ref 31.5–36.5)
MCV RBC AUTO: 95 FL (ref 78–100)
MONOCYTES # BLD AUTO: 0.7 10E9/L (ref 0–1.3)
MONOCYTES NFR BLD AUTO: 10.6 %
NEUTROPHILS # BLD AUTO: 3.1 10E9/L (ref 1.6–8.3)
NEUTROPHILS NFR BLD AUTO: 48.2 %
NRBC # BLD AUTO: 0 10*3/UL
NRBC BLD AUTO-RTO: 0 /100
PLATELET # BLD AUTO: 268 10E9/L (ref 150–450)
POTASSIUM SERPL-SCNC: 4 MMOL/L (ref 3.4–5.3)
RBC # BLD AUTO: 5.02 10E12/L (ref 4.4–5.9)
SODIUM SERPL-SCNC: 139 MMOL/L (ref 133–144)
TROPONIN I BLD-MCNC: 0 UG/L (ref 0–0.08)
TROPONIN I SERPL-MCNC: <0.015 UG/L (ref 0–0.04)
TROPONIN I SERPL-MCNC: <0.015 UG/L (ref 0–0.04)
WBC # BLD AUTO: 6.3 10E9/L (ref 4–11)

## 2019-04-18 PROCEDURE — 93017 CV STRESS TEST TRACING ONLY: CPT

## 2019-04-18 PROCEDURE — 40000065 ZZH STATISTIC EKG NON-CHARGEABLE: Performed by: EMERGENCY MEDICINE

## 2019-04-18 PROCEDURE — 25000128 H RX IP 250 OP 636: Performed by: INTERNAL MEDICINE

## 2019-04-18 PROCEDURE — 93010 ELECTROCARDIOGRAM REPORT: CPT | Mod: Z6 | Performed by: EMERGENCY MEDICINE

## 2019-04-18 PROCEDURE — G0378 HOSPITAL OBSERVATION PER HR: HCPCS

## 2019-04-18 PROCEDURE — 93016 CV STRESS TEST SUPVJ ONLY: CPT

## 2019-04-18 PROCEDURE — 99234 HOSP IP/OBS SM DT SF/LOW 45: CPT | Mod: Z6 | Performed by: NURSE PRACTITIONER

## 2019-04-18 PROCEDURE — 99285 EMERGENCY DEPT VISIT HI MDM: CPT | Mod: 25 | Performed by: EMERGENCY MEDICINE

## 2019-04-18 PROCEDURE — 78452 HT MUSCLE IMAGE SPECT MULT: CPT

## 2019-04-18 PROCEDURE — 36415 COLL VENOUS BLD VENIPUNCTURE: CPT | Performed by: NURSE PRACTITIONER

## 2019-04-18 PROCEDURE — 93018 CV STRESS TEST I&R ONLY: CPT

## 2019-04-18 PROCEDURE — 84484 ASSAY OF TROPONIN QUANT: CPT | Mod: 91 | Performed by: EMERGENCY MEDICINE

## 2019-04-18 PROCEDURE — 84484 ASSAY OF TROPONIN QUANT: CPT

## 2019-04-18 PROCEDURE — A9502 TC99M TETROFOSMIN: HCPCS | Performed by: EMERGENCY MEDICINE

## 2019-04-18 PROCEDURE — 34300033 ZZH RX 343: Performed by: EMERGENCY MEDICINE

## 2019-04-18 PROCEDURE — 85025 COMPLETE CBC W/AUTO DIFF WBC: CPT | Performed by: EMERGENCY MEDICINE

## 2019-04-18 PROCEDURE — 84484 ASSAY OF TROPONIN QUANT: CPT | Mod: 91 | Performed by: NURSE PRACTITIONER

## 2019-04-18 PROCEDURE — 80048 BASIC METABOLIC PNL TOTAL CA: CPT | Performed by: EMERGENCY MEDICINE

## 2019-04-18 PROCEDURE — 93005 ELECTROCARDIOGRAM TRACING: CPT | Performed by: EMERGENCY MEDICINE

## 2019-04-18 PROCEDURE — 25000132 ZZH RX MED GY IP 250 OP 250 PS 637: Performed by: EMERGENCY MEDICINE

## 2019-04-18 PROCEDURE — 71046 X-RAY EXAM CHEST 2 VIEWS: CPT

## 2019-04-18 RX ORDER — ACETAMINOPHEN 650 MG/1
650 SUPPOSITORY RECTAL EVERY 4 HOURS PRN
Status: DISCONTINUED | OUTPATIENT
Start: 2019-04-18 | End: 2019-04-18 | Stop reason: HOSPADM

## 2019-04-18 RX ORDER — ASPIRIN 81 MG/1
324 TABLET, CHEWABLE ORAL ONCE
Status: COMPLETED | OUTPATIENT
Start: 2019-04-18 | End: 2019-04-18

## 2019-04-18 RX ORDER — REGADENOSON 0.08 MG/ML
0.4 INJECTION, SOLUTION INTRAVENOUS ONCE
Status: COMPLETED | OUTPATIENT
Start: 2019-04-18 | End: 2019-04-18

## 2019-04-18 RX ORDER — ASPIRIN 81 MG/1
81 TABLET ORAL DAILY
Status: DISCONTINUED | OUTPATIENT
Start: 2019-04-19 | End: 2019-04-18 | Stop reason: HOSPADM

## 2019-04-18 RX ORDER — ACETAMINOPHEN 325 MG/1
650 TABLET ORAL EVERY 4 HOURS PRN
Status: DISCONTINUED | OUTPATIENT
Start: 2019-04-18 | End: 2019-04-18 | Stop reason: HOSPADM

## 2019-04-18 RX ORDER — NALOXONE HYDROCHLORIDE 0.4 MG/ML
.1-.4 INJECTION, SOLUTION INTRAMUSCULAR; INTRAVENOUS; SUBCUTANEOUS
Status: DISCONTINUED | OUTPATIENT
Start: 2019-04-18 | End: 2019-04-18 | Stop reason: HOSPADM

## 2019-04-18 RX ORDER — LIDOCAINE 40 MG/G
CREAM TOPICAL
Status: DISCONTINUED | OUTPATIENT
Start: 2019-04-18 | End: 2019-04-18 | Stop reason: HOSPADM

## 2019-04-18 RX ORDER — NITROGLYCERIN 0.4 MG/1
0.4 TABLET SUBLINGUAL EVERY 5 MIN PRN
Status: DISCONTINUED | OUTPATIENT
Start: 2019-04-18 | End: 2019-04-18 | Stop reason: HOSPADM

## 2019-04-18 RX ORDER — ALUMINA, MAGNESIA, AND SIMETHICONE 2400; 2400; 240 MG/30ML; MG/30ML; MG/30ML
30 SUSPENSION ORAL EVERY 4 HOURS PRN
Status: DISCONTINUED | OUTPATIENT
Start: 2019-04-18 | End: 2019-04-18 | Stop reason: HOSPADM

## 2019-04-18 RX ADMIN — TETROFOSMIN 9.5 MCI.: 1.38 INJECTION, POWDER, LYOPHILIZED, FOR SOLUTION INTRAVENOUS at 08:36

## 2019-04-18 RX ADMIN — TETROFOSMIN 37 MCI.: 1.38 INJECTION, POWDER, LYOPHILIZED, FOR SOLUTION INTRAVENOUS at 10:53

## 2019-04-18 RX ADMIN — REGADENOSON 0.4 MG: 0.08 INJECTION, SOLUTION INTRAVENOUS at 10:51

## 2019-04-18 RX ADMIN — ASPIRIN 81 MG 324 MG: 81 TABLET ORAL at 05:12

## 2019-04-18 NOTE — ED PROVIDER NOTES
"  History     Chief Complaint   Patient presents with     Chest Pain     HPI  Dimitris Carroll is a 49 year old male with PMH notable for paroxysmal atrial fibrillation who presents to the ED with chest pain. Symptoms started about 0415, which the patient noted upon awakening. He felt pressure quality pain in the left anterior chest. It lasted about 5 minutes, then dissipated to now being \"minimally noticeable\". No palpitations, no shortness of breath. Patient notes this felt different from past episodes of paroxysmal Afib. No recent cough nor fever.      I have reviewed the Medications, Allergies, Past Medical and Surgical History, and Social History in the Epic system.    Review of Systems  A complete review of systems was performed with pertinent positives and negatives noted in the HPI, and all other systems negative.     Physical Exam   BP: 121/83  Pulse: 56  Heart Rate: 56  Temp: 97.8  F (36.6  C)  Resp: 18  Height: 190.5 cm (6' 3\")  SpO2: 99 %    Physical Exam  General: no acute distress. Appears stated age.   HENT: MMM, no oropharyngeal lesions  Eyes: PERRL, normal sclerae  Cardio: mildly bradycardic rate. Regular rhythm. Extremities well perfused  Resp: Normal work of breathing, clear breath sounds  Abdomen: no tenderness, non-distended, no rebound, no guarding  Neuro: alert and fully oriented. CN II-XII grossly intact. Grossly normal strength and sensation in all extremities.   MSK: no deformities.   Integumentary/Skin: no rash visualized, normal color  Psych: normal affect, normal behavior    ED Course      Procedures             EKG Interpretation:      Interpreted by Gabino Ayala  Time reviewed: 0455  Symptoms at time of EKG: recent chest pain   Rhythm: sinus bradycardia  Rate: 50  Axis: normal  Ectopy: none  Conduction: normal  ST Segments/ T Waves: T inversion in III similar to previous. No other ST-T wave changes  Q Waves: none  Comparison to prior: Unchanged from 11/9/2018, " 12/20/2017    Clinical Impression: normal EKG    Critical Care time:  none       Labs Ordered and Resulted from Time of ED Arrival Up to the Time of Departure from the ED   BASIC METABOLIC PANEL - Abnormal; Notable for the following components:       Result Value    Glucose 106 (*)     Calcium 8.2 (*)     All other components within normal limits   TROPONIN I   CBC WITH PLATELETS DIFFERENTIAL   CARDIAC CONTINUOUS MONITORING   TROPONIN POCT            Assessments & Plan (with Medical Decision Making)   Patient presenting with left side chest pain of 5 minutes duration that started about 45 minutes prior to ED arrival. Vitals in the ED wnl.     ASA given, cardiac monitoring placed. ECG showed NSR without evidence of acute ischemia, chronic III T inversion. Initial torponin negative. given short duration of time from pain onset to ED presentation, cannot adequately rule out ACS with single ECG/troponin. Plan further observation for serial ECG/troponins. CXR without evidence of PTX, pneumonia, nor effusion.     After counseling on the diagnosis, work-up, and treatment plan, the patient was admitted to ED obs for completion of ACS evaluation.       Final diagnoses:   Left sided chest pain       --  Gabino Ayala MD  Emergency Medicine     I have reviewed the nursing notes.  I have reviewed the findings, diagnosis, plan and need for follow up with the patient.    4/18/2019   [facility], EMERGENCY DEPARTMENT     Gabino Ayala MD  04/18/19 0776

## 2019-04-18 NOTE — ED NOTES
"Pt c/o L chest pain that started during the night. States it \"maybe\" woke him up. Not currently in pain.   "

## 2019-04-18 NOTE — PLAN OF CARE
Patient discharged to home via private vehicle. Provided discharge education. IV out. All personal belongings sent with.

## 2019-04-18 NOTE — DISCHARGE SUMMARY
Discharge Summary    Dimitris Carroll MRN# [id number]   YOB: 1970 Age: 49 year old     Date of Admission:  4/18/2019  Date of Discharge:  4/18/2019  Admitting Physician:  Gabino Ayala MD  Discharge Physician:  Dr. Aris Cespedes  Discharging Service:  Emergency Medicine     Primary Provider: Lenin Bermudez          Discharge Diagnosis:     Chest pain    * No resolved hospital problems. *               Discharge Disposition:   Discharged to home           Condition on Discharge:   Discharge condition: Stable   Code status on discharge: Full Code           Procedures:   No procedures performed during this admission          Discharge Medications:     Current Discharge Medication List      CONTINUE these medications which have NOT CHANGED    Details   aspirin (ASPIRIN LOW DOSE) 81 MG tablet Take 1 tablet (81 mg) by mouth daily  Qty: 30 tablet, Refills: 11    Associated Diagnoses: Paroxysmal atrial fibrillation (H)      HEMP OIL OR EXTRACT OR OTHER CBD CANNABINOID, NOT MEDICAL CANNABIS,       metoprolol succinate (TOPROL XL) 25 MG 24 hr tablet Take 1 tablet (25 mg) by mouth daily  Qty: 30 tablet, Refills: 11    Associated Diagnoses: Paroxysmal atrial fibrillation (H)      !! order for DME Equipment being ordered: travel Auto CPAP with minimum and maximum pressure settings at 12 cm water  Qty: 1 Device, Refills: 0    Associated Diagnoses: MILAGROS on CPAP      !! order for DME Equipment being ordered: CPAPPatient Dimitris Carroll was set up at [place] on December 17, 2015. Patient received a [employer]med AirSense 10 Auto. Pressures were set at Auto 7 - 12 cm H2O.   Patient s ramp is 5 cm H2O for Auto and FLEX/EPR is A Flex.  Patient received a [employer] Mask name: SIMPLUS FFmask Size Large, heated tubing and heated humidifier.  Patient is enrolled in the STM Program and does need to meet compliance. Patient has a follow up on TBD with Sole Oquendo NP.   Hailey Perdomo      propafenone  (RYTHMOL) 300 MG tablet Take 2 tablets (600 mg) by mouth once as needed For recurrence of atrial fibrillation. One dose per 24 hours as needed.  Qty: 20 tablet, Refills: 0    Associated Diagnoses: Paroxysmal atrial fibrillation (H)      UNABLE TO FIND MEDICATION NAME: CBO oil at bedtime       !! - Potential duplicate medications found. Please discuss with provider.                Consultations:   No consultations were requested during this admission             Brief History of Illness:   Please see detailed H&P from 4/18/19, in brief:   Dimitris Carroll is a 49 year old male with a past medical history of paroxysmal afib s/p DCCV (2015 and 2017), and obstructive sleep apnea who presents to the ED with left chest pain that started this morning.             Hospital Course:   1. Chest pain: Pt woke from sleep with left chest pain/pressure that radiated into his shoulder at 0400 this morning. He has a history of paroxysmal afib for which he carries propafenone pill-in-the-pocket for use at the onset of symptoms. He is not anticoagulated, but takes ASA daily. In the ED, /83, HR 56, temp 97.8, RR 18, 99% on RA. EKG shows sinus jad. Chest xray is unremarkable. Initial troponin negative. Na 139, K+ 4, Cr 1.03, BUN 14. WBC 6.3, Hgb 15.6, hematocrit 47.6, Platelets 268. Pt was given ASA 324mg x1. Chest pain has resolved. Last Echo 12/2018 showed EF 60-65%, LV function normalized and normal sinus rhythm.  While in the observation unit the patient's vital signs remained stable, afebrile.  Serial troponins negative X 3, no ectopy on telemetry.  The patient underwent a NM stress test and this was normal.  Imaging did show 2 right lung nodules, favored to be benign.  Patient informed of these and need to discuss follow up imaging with his PCP.  He had no further chest pain in the observation unit.  Patient was given all results and instructed to follow up with his PCP in one week.  Return if the patient has new or  "worsening chest pain, SOB, nausea, or lightheadedness. Patient was in agreement with the plan and was discharged to home in good condition          Chronic Medical Problems  ##MILAGROS: Uses CPAP  ##Paroxysmal Afib: Pt has undergone cardioversion twice, in 2015 and 2017. Rhythmol converted him once as well, in 2016. Pt carries propafenone 600mg for use at the onset of afib symptoms and had to use it twice in 2018. Toprol XL daily for rate control. ASA 324mg daily for stroke prophylaxis.                  Final Day of Progress before Discharge:       Physical Exam:  Blood pressure 119/85, pulse 56, temperature 97.7  F (36.5  C), temperature source Oral, resp. rate 18, height 1.905 m (6' 3\"), SpO2 99 %.    EXAM:  Exam:  Constitutional: healthy, alert and no distress  Cardiovascular: No lifts, heaves, or thrills. RRR. No murmurs, clicks gallops or rub  Respiratory: Good diaphragmatic excursion. Lungs clear  Gastrointestinal: Abdomen soft, non-tender. BS normal. No masses, organomegaly  Musculoskeletal: extremities normal- no gross deformities noted, gait normal and normal muscle tone  Skin: no suspicious lesions or rashes  Neurologic: Gait normal. Alert and oriented.   Psychiatric: mentation appears normal and affect normal/bright    /85 (BP Location: Left arm)   Pulse 56   Temp 97.7  F (36.5  C) (Oral)   Resp 18   Ht 1.905 m (6' 3\")   SpO2 99%   BMI 27.25 kg/m               Data:  All laboratory data reviewed             Significant Results:     Results for orders placed or performed during the hospital encounter of 04/18/19   XR Chest 2 Views    Narrative    EXAM: XR CHEST 2 VW  4/18/2019 5:22 AM      HISTORY: left chest pain    COMPARISON: Chest radiograph 5/13/2016    FINDINGS:     Two views of the chest.  The cardiomediastinal silhouette is within  normal limits. Normal pulmonary vasculature. No pleural effusion. No  pneumothorax.       Impression    IMPRESSION: No acute disease within the chest.    I have " personally reviewed the examination and initial interpretation  and I agree with the findings.    JOSEPH MONROE MD   NM Lexiscan stress test (nuc card)    Narrative    Examination:   NM MPI WITH LEXISCAN   Code:   [id number]   Date:  4/18/2019 12:19 PM     Indication:  ACS, possible, negative troponin; left chest pain, hx of  paroxysmal afib     Previous Study: No previous Myocardial Perfusion studies for  comparison.    Additional Information:  49 year old male with PMH notable for  paroxysmal atrial fibrillation who presents to the ED with chest pain.  ECG showed NSR without evidence of acute ischemia, chronic III T  inversion. Initial troponin negative.  short duration of time from pain onset to ED presentation, ACS is not  ruled out.    Comparison: Cardiac MRI 11/25/2015  Protocol:    Rest and stress myocardial perfusion imaging was performed using 9.5  and 37 mCi of Tc-99m tetrofosmin. Pharmacological stress was performed  with 0.4  mg of Lexiscan.   SPECT-CT was performed using Smart zoom.    Findings:  1. Overall quality of the study:  Diagnostic  .   2. Left ventricular cavity is nondilated on the rest and stress  studies.  3. SPECT images demonstrate  normal perfusion of the myocardium on the  rest and stress images. No perfusion defect to suggest infarct or  ischemia.   4. Left ventricular ejection fraction is 64%. Left ventricular  end-diastolic volume is 160 mL. End-systolic volume is 57 mL. Normal  left ventricular myocardial contraction  5. Baseline EKG  findings reported separately in EPIC.  6. Limited noncontrast chest CT shows normal heart size. No  pericardial effusion. No mediastinal lymphadenopathy. No alveolar  opacity to suggest an infectious process. Few tiny nodules less than 4  mm in size in the right lung (for example series 3 image 20 and series  3 image 7). Visualized osseous structures are unremarkable.      Impression    Impression:  1. Normal myocardial SPECT perfusion study without any  evidence of  ischemia or infarct.  2. Normal left ventricular ejection fraction/myocardial contractility.  3. Two lung nodules in the right lung smaller than 4 mm, favored as  benign.     Troponin I   Result Value Ref Range    Troponin I ES <0.015 0.000 - 0.045 ug/L   CBC with platelets differential   Result Value Ref Range    WBC 6.3 4.0 - 11.0 10e9/L    RBC Count 5.02 4.4 - 5.9 10e12/L    Hemoglobin 15.6 13.3 - 17.7 g/dL    Hematocrit 47.6 40.0 - 53.0 %    MCV 95 78 - 100 fl    MCH 31.1 26.5 - 33.0 pg    MCHC 32.8 31.5 - 36.5 g/dL    RDW 12.4 10.0 - 15.0 %    Platelet Count 268 150 - 450 10e9/L    Diff Method Automated Method     % Neutrophils 48.2 %    % Lymphocytes 37.8 %    % Monocytes 10.6 %    % Eosinophils 2.7 %    % Basophils 0.5 %    % Immature Granulocytes 0.2 %    Nucleated RBCs 0 0 /100    Absolute Neutrophil 3.1 1.6 - 8.3 10e9/L    Absolute Lymphocytes 2.4 0.8 - 5.3 10e9/L    Absolute Monocytes 0.7 0.0 - 1.3 10e9/L    Absolute Eosinophils 0.2 0.0 - 0.7 10e9/L    Absolute Basophils 0.0 0.0 - 0.2 10e9/L    Abs Immature Granulocytes 0.0 0 - 0.4 10e9/L    Absolute Nucleated RBC 0.0    Basic metabolic panel   Result Value Ref Range    Sodium 139 133 - 144 mmol/L    Potassium 4.0 3.4 - 5.3 mmol/L    Chloride 105 94 - 109 mmol/L    Carbon Dioxide 27 20 - 32 mmol/L    Anion Gap 7 3 - 14 mmol/L    Glucose 106 (H) 70 - 99 mg/dL    Urea Nitrogen 14 7 - 30 mg/dL    Creatinine 1.03 0.66 - 1.25 mg/dL    GFR Estimate 84 >60 mL/min/[1.73_m2]    GFR Estimate If Black >90 >60 mL/min/[1.73_m2]    Calcium 8.2 (L) 8.5 - 10.1 mg/dL   Troponin I   Result Value Ref Range    Troponin I ES <0.015 0.000 - 0.045 ug/L   EKG 12 lead   Result Value Ref Range    Interpretation ECG Click View Image link to view waveform and result    Troponin POCT   Result Value Ref Range    Troponin I 0.00 0.00 - 0.08 ug/L      Recent Results (from the past 48 hour(s))   XR Chest 2 Views    Narrative    EXAM: XR CHEST 2 VW  4/18/2019 5:22 AM       HISTORY: left chest pain    COMPARISON: Chest radiograph 5/13/2016    FINDINGS:     Two views of the chest.  The cardiomediastinal silhouette is within  normal limits. Normal pulmonary vasculature. No pleural effusion. No  pneumothorax.       Impression    IMPRESSION: No acute disease within the chest.    I have personally reviewed the examination and initial interpretation  and I agree with the findings.    JOSEPH MONROE MD   NM Lexiscan stress test (nuc card)    Narrative    Examination:   NM MPI WITH LEXISCAN   Code:   [id number]   Date:  4/18/2019 12:19 PM     Indication:  ACS, possible, negative troponin; left chest pain, hx of  paroxysmal afib     Previous Study: No previous Myocardial Perfusion studies for  comparison.    Additional Information:  49 year old male with PMH notable for  paroxysmal atrial fibrillation who presents to the ED with chest pain.  ECG showed NSR without evidence of acute ischemia, chronic III T  inversion. Initial troponin negative.  short duration of time from pain onset to ED presentation, ACS is not  ruled out.    Comparison: Cardiac MRI 11/25/2015  Protocol:    Rest and stress myocardial perfusion imaging was performed using 9.5  and 37 mCi of Tc-99m tetrofosmin. Pharmacological stress was performed  with 0.4  mg of Lexiscan.   SPECT-CT was performed using Smart zoom.    Findings:  1. Overall quality of the study:  Diagnostic  .   2. Left ventricular cavity is nondilated on the rest and stress  studies.  3. SPECT images demonstrate  normal perfusion of the myocardium on the  rest and stress images. No perfusion defect to suggest infarct or  ischemia.   4. Left ventricular ejection fraction is 64%. Left ventricular  end-diastolic volume is 160 mL. End-systolic volume is 57 mL. Normal  left ventricular myocardial contraction  5. Baseline EKG  findings reported separately in EPIC.  6. Limited noncontrast chest CT shows normal heart size. No  pericardial effusion. No mediastinal  lymphadenopathy. No alveolar  opacity to suggest an infectious process. Few tiny nodules less than 4  mm in size in the right lung (for example series 3 image 20 and series  3 image 7). Visualized osseous structures are unremarkable.      Impression    Impression:  1. Normal myocardial SPECT perfusion study without any evidence of  ischemia or infarct.  2. Normal left ventricular ejection fraction/myocardial contractility.  3. Two lung nodules in the right lung smaller than 4 mm, favored as  benign.                  Pending Results:   Unresulted Labs Ordered in the Past 30 Days of this Admission     No orders found from 2/17/2019 to 4/19/2019.                  Discharge Instructions and Follow-Up:     Discharge Procedure Orders   Reason for your hospital stay   Order Comments: You were admitted to the observation unit for evaluation of your chest pain.  You had no EKG changes, labs checking for heart damage were negative, your chest x-ray was normal.  You underwent a stress test and this was normal, but did show 2 small right lung nodules, favored as benign.  Please discuss this with your PCP and they may want to repeat a scan for monitoring or these nodules     Adult [facility] Follow-up and recommended labs and tests   Order Comments: Follow up with primary care provider, Lenin Bermudez, within 7 days for hospital follow- up.      Appointments on [facility] and/or [facility] (with [facility] or [facility] provider or service). Call [phone] if you haven't heard regarding these appointments within 7 days of discharge.     Activity   Order Comments: Your activity upon discharge: activity as tolerated     Order Specific Question Answer Comments   Is discharge order? Yes      When to contact your care team   Order Comments: Return to the ER if you have new or worsening chest pain, shortness of breath, jaw or arm pain, or fainting.     Diet   Order Comments: Follow this diet upon discharge: Orders Placed This  Encounter      Regular Diet Adult     Order Specific Question Answer Comments   Is discharge order? Yes           Attestation:  Erica Tyler.  APRN, CNP        This patient was discussed with the Care Team in the OBS Unit.  The patient's chart was reviewed and the patient was also seen and evaluated by me.  The plan of care was discussed and reviewed with the Care Team.  The above documentation reflects the evaluation, medical decision making and plan under my supervision.    Aris Cespedes MD, FACEP  [facility] Staff Emergency Physician

## 2019-04-18 NOTE — H&P
[facility]    History and Physical - ED Observation       Date of Admission:  4/18/2019    Assessment & Plan   Dimitris Carroll is a 49 year old male with a past medical history of paroxysmal afib s/p DCCV (2015 and 2017), and obstructive sleep apnea who presents to the ED with left chest pain that started this morning.     1. Chest pain: Pt woke from sleep with left chest pain/pressure that radiated into his shoulder at 0400 this morning. He has a history of paroxysmal afib for which he carries propafenone pill-in-the-pocket for use at the onset of symptoms. He is not anticoagulated, but takes ASA daily. In the ED, /83, HR 56, temp 97.8, RR 18, 99% on RA. EKG shows sinus jad. Chest xray is unremarkable. Initial troponin negative. Na 139, K+ 4, Cr 1.03, BUN 14. WBC 6.3, Hgb 15.6, hematocrit 47.6, Platelets 268. Pt was given ASA 324mg x1. Chest pain has resolved. Last Echo 12/2018 showed EF 60-65%, LV function normalized and normal sinus rhythm.  -Admit to ED Obs  -VS Q4hrs  -Troponins  -Lexiscan today  -Clear liquids  -Continuous cardiac monitoring      Chronic Medical Problems  ##MILAGROS: Uses CPAP  ##Paroxysmal Afib: Pt has undergone cardioversion twice, in 2015 and 2017. Rhythmol converted him once as well, in 2016. Pt carries propafenone 600mg for use at the onset of afib symptoms and had to use it twice in 2018. Toprol XL daily for rate control. ASA 324mg daily for stroke prophylaxis.     Diet: Clear liquid  DVT Prophylaxis: Low Risk/Ambulatory with no VTE prophylaxis indicated  Thorne Catheter: not present  Code Status: Full    Disposition Plan   Expected discharge: Today, recommended to prior living arrangement once cardiac workup complete.  Entered: Urvashi Rodgers CNP 04/18/2019, 6:13 AM       Urvashi Rodgers CNP  [facility]  ED Observation, 6D  Ascom:  "[id number]  ______________________________________________________________________    Chief Complaint   Chest pain    History is obtained from the patient    History of Present Illness   Per ED, \"Dimitris Carroll is a 49 year old male with PMH notable for paroxysmal atrial fibrillation who presents to the ED with chest pain. Symptoms started about 0415, which the patient noted upon awakening. He felt pressure quality pain in the left anterior chest. It lasted about 5 minutes, then dissipated to now being \"minimally noticeable\". No palpitations, no shortness of breath. Patient notes this felt different from past episodes of paroxysmal Afib. No recent cough nor fever.\"        Review of Systems    The 10 point Review of Systems is negative other than noted in the HPI or here.     Past Medical History    I have reviewed this patient's medical history and updated it with pertinent information if needed.   Past Medical History:   Diagnosis Date     Allergic state      Atrial fibrillation (H)      Sleep apnea        Past Surgical History   I have reviewed this patient's surgical history and updated it with pertinent information if needed.  Past Surgical History:   Procedure Laterality Date     ANESTHESIA CARDIOVERSION N/A 11/20/2017    Procedure: ANESTHESIA CARDIOVERSION;  Cardioversion ;  Surgeon: GENERIC ANESTHESIA PROVIDER;  Location: [facility] OR       Social History   I have reviewed this patient's social history and updated it with pertinent information if needed.  Social History     Tobacco Use     Smoking status: Never Smoker     Smokeless tobacco: Never Used   Substance Use Topics     Alcohol use: No     Alcohol/week: 0.0 oz     Drug use: No       Family History   I have reviewed this patient's family history and updated it with pertinent information if needed.   History reviewed. No pertinent family history.    Prior to Admission Medications   Prior to Admission Medications   Prescriptions Last Dose Informant Patient " Reported? Taking?   HEMP OIL OR EXTRACT OR OTHER CBD CANNABINOID, NOT MEDICAL CANNABIS, 4/17/2019 at Unknown time  Yes Yes   UNABLE TO FIND Unknown at Unknown time  Yes No   Sig: MEDICATION NAME: CBO oil at bedtime   aspirin (ASPIRIN LOW DOSE) 81 MG tablet 4/17/2019 at Unknown time  No Yes   Sig: Take 1 tablet (81 mg) by mouth daily   metoprolol succinate (TOPROL XL) 25 MG 24 hr tablet 4/17/2019 at Unknown time  No Yes   Sig: Take 1 tablet (25 mg) by mouth daily   order for DME Unknown at Unknown time  Yes No   Sig: Equipment being ordered: CPAPPatient Dimitris Carroll was set up at [place] on December 17, 2015. Patient received a [employer] AirSense 10 Auto. Pressures were set at Auto 7 - 12 cm H2O.   Patient s ramp is 5 cm H2O for Auto and FLEX/EPR is A Flex.  Patient received a [employer] Mask name: SIMPLUS FFmask Size Large, heated tubing and heated humidifier.  Patient is enrolled in the STM Program and does need to meet compliance. Patient has a follow up on TBD with Sole Oquendo NP.   Hailey Perdomo   order for DME Unknown at Unknown time  No No   Sig: Equipment being ordered: travel Auto CPAP with minimum and maximum pressure settings at 12 cm water   propafenone (RYTHMOL) 300 MG tablet Unknown at Unknown time  No No   Sig: Take 2 tablets (600 mg) by mouth once as needed For recurrence of atrial fibrillation. One dose per 24 hours as needed.      Facility-Administered Medications: None     Allergies   Allergies   Allergen Reactions     Milk Protein Extract GI Disturbance     Wheat Gluten [Gluten Meal] GI Disturbance       Physical Exam   Vital Signs: Temp: 97.8  F (36.6  C) Temp src: Oral BP: 121/83   Heart Rate: 56 Resp: 18 SpO2: 99 % O2 Device: None (Room air)    Weight: 0 lbs 0 oz    Constitutional: awake, alert, cooperative, no apparent distress, and appears stated age  Eyes: Lids and lashes normal, pupils equal, round and reactive to light, extra ocular muscles intact, sclera clear, conjunctiva  normal  ENT: Normocephalic, without obvious abnormality, atraumatic, sinuses nontender on palpation, external ears without lesions, oral pharynx with moist mucous membranes, tonsils without erythema or exudates, gums normal and good dentition.  Respiratory: No increased work of breathing, good air exchange, clear to auscultation bilaterally, no crackles or wheezing  Cardiovascular: Normal apical impulse, regular rate and rhythm, normal S1 and S2, no S3 or S4, and no murmur noted  Abdomen: Normal bowel sounds, soft, non-distended, non-tender, no masses palpated, no hepatosplenomegally  Skin: normal skin color, texture, turgor  Musculoskeletal: There is no redness, warmth, or swelling of the joints.  Full range of motion noted.  Motor strength is 5 out of 5 all extremities bilaterally.  Tone is normal.  Neurologic: Awake, alert, oriented to name, place and time.  Cranial nerves II-XII are grossly intact.  Motor is 5 out of 5 bilaterally.  Gait is normal.    Data   Data reviewed today: I reviewed all medications, new labs and imaging results over the last 24 hours. I personally reviewed the EKG tracing showing sinus bradycardia and the chest x-ray image(s) showing mild pulmonary venous congestion.    Recent Labs   Lab 04/18/19  0500   WBC 6.3   HGB 15.6   MCV 95         POTASSIUM 4.0   CHLORIDE 105   CO2 27   BUN 14   CR 1.03   ANIONGAP 7   CLIFF 8.2*   *   TROPI <0.015   TROPONIN 0.00

## 2019-08-05 DIAGNOSIS — I48.0 PAROXYSMAL ATRIAL FIBRILLATION (H): ICD-10-CM

## 2019-08-06 RX ORDER — METOPROLOL SUCCINATE 25 MG/1
25 TABLET, EXTENDED RELEASE ORAL DAILY
Qty: 90 TABLET | Refills: 0 | Status: SHIPPED | OUTPATIENT
Start: 2019-08-06 | End: 2019-11-13

## 2019-10-04 ENCOUNTER — HEALTH MAINTENANCE LETTER (OUTPATIENT)
Age: 49
End: 2019-10-04

## 2019-11-12 DIAGNOSIS — I48.0 PAROXYSMAL ATRIAL FIBRILLATION (H): ICD-10-CM

## 2019-11-13 DIAGNOSIS — I48.0 PAROXYSMAL ATRIAL FIBRILLATION (H): ICD-10-CM

## 2019-11-13 RX ORDER — METOPROLOL SUCCINATE 25 MG/1
25 TABLET, EXTENDED RELEASE ORAL DAILY
Qty: 90 TABLET | Refills: 0 | Status: SHIPPED | OUTPATIENT
Start: 2019-11-13 | End: 2019-11-13

## 2019-11-13 RX ORDER — METOPROLOL SUCCINATE 25 MG/1
25 TABLET, EXTENDED RELEASE ORAL DAILY
Qty: 90 TABLET | Refills: 0 | Status: SHIPPED | OUTPATIENT
Start: 2019-11-13 | End: 2020-01-08

## 2019-11-13 RX ORDER — METOPROLOL SUCCINATE 25 MG/1
TABLET, EXTENDED RELEASE ORAL
Qty: 90 TABLET | Refills: 0 | OUTPATIENT
Start: 2019-11-13

## 2019-11-13 NOTE — TELEPHONE ENCOUNTER
[facility] Call Center    Phone Message    May a detailed message be left on voicemail: yes    Reason for Call: Other: pt would like it added that they are out of the medication and need the refill by tomorrow.      Action Taken: Message routed to:  [facility] ([facility] mark cardio

## 2019-12-09 ENCOUNTER — OFFICE VISIT (OUTPATIENT)
Dept: SLEEP MEDICINE | Facility: CLINIC | Age: 49
End: 2019-12-09
Payer: COMMERCIAL

## 2019-12-09 VITALS
HEART RATE: 60 BPM | BODY MASS INDEX: 28.1 KG/M2 | WEIGHT: 226 LBS | OXYGEN SATURATION: 96 % | RESPIRATION RATE: 16 BRPM | DIASTOLIC BLOOD PRESSURE: 70 MMHG | HEIGHT: 75 IN | SYSTOLIC BLOOD PRESSURE: 109 MMHG

## 2019-12-09 DIAGNOSIS — G47.33 OSA ON CPAP: Primary | ICD-10-CM

## 2019-12-09 PROCEDURE — 99213 OFFICE O/P EST LOW 20 MIN: CPT | Performed by: INTERNAL MEDICINE

## 2019-12-09 ASSESSMENT — MIFFLIN-ST. JEOR: SCORE: 1965.76

## 2019-12-09 NOTE — PROGRESS NOTES
SLEEP CLINIC FOLLOW UP VISIT     Date  of viist: 12/9/2019     Purpose of visit: Follow-up of MILAGROS     History of present illness: Mr. Carroll is a 49 year old male who has a past medical history significant for PAF , s/p DCCV 10/2015 and 11/20/17, and MILAGROS.     He is currently being treated with the CPAP device with fixed pressure setting at 12 cm of water.  He uses a full facemask. He  reports using the CPAP regularly during sleep.  Sometimes when he wakes up in the middle of the night he may not use his CPAP because it may be  difficult for him to fall back asleep with the mask on. With the CPAP he does not think he snores but will be checking with his wife about it.  He denies awakenings due to gasping for air or choking with the CPAP use.  He reports good tolerance to the current pressure settings and denies air hunger.  He denies fatigue but reports excessive daytime sleepiness, endorsing an [place] sleepiness score of 12 out of 24 which he attributes to insufficient sleep duration.  He denies any drowsiness while driving.     Downloadable compliance data for the past 1 month shows that he has used the device for 28 out of 30 days with an average duration of usage of 4 hours and 1 minute on the days used.  95th percentile for air leak was 67.2 l/min.  Residual AHI was 0.8 /h.    Previous sleep study (December 16, 2015)when he weighed 206 pounds:  Moderate obstructive sleep apnea.  Respiration: During the diagnostic portion of the study, the polysomnogram revealed no apneas.  There were 58 hypopneas resulting in a hypopnea index of 25.9 events per hour. Most hypopneas were related to snoring.  The combined apnea/hypopnea index was 25.9 events per hour.  The REM AHI was 0 events per hour.  The supine AHI was 25.9 events per hour.  The RERA index was 0 events per hour.  The RDI was 25.9 events per hour. Snoring - was reported as rare, mild snoring while supine. Respiratory rate and pattern - was notable for normal  respiratory rate and pattern.Sustained Sleep Associated Hypoventilation - Transcutaneous carbon dioxide monitoring was not used, however significant hypoventilation was not suggested by oximetry. Sleep Associated Hypoxemia - (Greater than 5 minutes O2 sat below 89%) was not present.  Baseline oxygen saturation was 95.2%. Lowest oxygen saturation was 89.0%.  Time spent below 89% was 0 minutes.      Current meds:  Current Outpatient Medications   Medication Sig Dispense Refill     aspirin (ASPIRIN LOW DOSE) 81 MG tablet Take 1 tablet (81 mg) by mouth daily 30 tablet 11     HEMP OIL OR EXTRACT OR OTHER CBD CANNABINOID, NOT MEDICAL CANNABIS,        metoprolol succinate ER (TOPROL-XL) 25 MG 24 hr tablet Take 1 tablet (25 mg) by mouth daily 90 tablet 0     order for DME Equipment being ordered: travel Auto CPAP with minimum and maximum pressure settings at 12 cm water 1 Device 0     order for DME Equipment being ordered: CPAPPatient Dimitris Carroll was set up at [facility] on December 17, 2015. Patient received a Resmed AirSense 10 Auto. Pressures were set at Auto 7 - 12 cm H2O.   Patient s ramp is 5 cm H2O for Auto and FLEX/EPR is A Flex.  Patient received a [employer] Mask name: SIMPLUS FFmask Size Large, heated tubing and heated humidifier.  Patient is enrolled in the STM Program and does need to meet compliance. Patient has a follow up on TBD with Sole Oquendo NP.   Hailey Perdomo       propafenone (RYTHMOL) 300 MG tablet Take 2 tablets (600 mg) by mouth once as needed For recurrence of atrial fibrillation. One dose per 24 hours as needed. 20 tablet 0     UNABLE TO FIND MEDICATION NAME: CBO oil at bedtime       Problem list:  Patient Active Problem List   Diagnosis     Paroxysmal atrial fibrillation (H)     MILAGROS (obstructive sleep apnea)     Insomnia due to psychological stress     Atrial fibrillation (H)     Chest pain     Past medical history, Past surgical history, Allergies, Social history, Family history:  "reviewed, per EMR     Exam:  /70   Pulse 60   Resp 16   Ht 1.905 m (6' 3\")   Wt 102.5 kg (226 lb)   SpO2 96%   BMI 28.25 kg/m    General appearance: Well-developed, well-nourished, in no apparent distress  Pt is dressed casually, cooperative with good eye contact.   CVS: regular rhythm, normal S1 with physiologic split S2, no S3 or S4 and no murmur  Resp: lungs clear to auscultation - no rales, rhonchi or wheezes, no use of accessory muscles  Speech is spontaneous with regular rate and volume.   Mood: euthymic; affect congruent with full range and intensity.   Sensorium: awake, alert and oriented to person, place, time, and situation.      Assessment/Plan:  1. MILAGROS: Patient reports using the CPAP device regularly and based on the compliance download MILAGRSO is adequately controlled with the CPAP at the current pressure setting.  Prescription was provided for renewal of all the CPAP supplies.  He was recommended to continue using the CPAP regularly during sleep and using it for the entire sleep duration to derive maximum benefit and instructed to get the supplies for the device replaced regularly.  There was significant air leak per the compliance download and hopefully getting the mask regularly replaced will alleviate the air leaks.      2.  Chronic insufficient sleep: We discussed the consequences of chronic insufficient sleep including the effects of cardiovascular disease and recommended him to increase his sleep duration to  7-8 hours per night.     3.  Paroxysmal atrial fibrillation: Recommended continue to follow-up with cardiology.     Encouraged eating healthy and exercising regularly.    Patient was strongly advised to avoid driving, operating any heavy machinery or other hazardous situations while drowsy or sleepy.  Patient was counseled on the importance of driving while alert, to pull over if drowsy, or nap before getting into the vehicle if sleepy.      He will follow-up at the sleep clinic in 1 " "year or sooner if any concerns.        The above note was dictated using voice recognition software. Although reviewed after completion, some word and grammatical error may remain . Please contact the author for any clarifications.     \"I spent a total of 15 minutes face to face with Dimitris Carroll during today's office visit. Over 50% of this time was spent counseling the patient and  coordinating care regarding sleep apnea, CPAP treatment, increasing sleep duration, weight management\"         Lynette Antoine MD   of Medicine,  Division of Pulmonary/Sleep Medicine  [facility].  "

## 2019-12-09 NOTE — PATIENT INSTRUCTIONS
Your BMI is Body mass index is 28.25 kg/m .  Weight management is a personal decision.  If you are interested in exploring weight loss strategies, the following discussion covers the approaches that may be successful. Body mass index (BMI) is one way to tell whether you are at a healthy weight, overweight, or obese. It measures your weight in relation to your height.  A BMI of 18.5 to 24.9 is in the healthy range. A person with a BMI of 25 to 29.9 is considered overweight, and someone with a BMI of 30 or greater is considered obese. More than two-thirds of American adults are considered overweight or obese.  Being overweight or obese increases the risk for further weight gain. Excess weight may lead to heart disease and diabetes.  Creating and following plans for healthy eating and physical activity may help you improve your health.  Weight control is part of healthy lifestyle and includes exercise, emotional health, and healthy eating habits. Careful eating habits lifelong are the mainstay of weight control. Though there are significant health benefits from weight loss, long-term weight loss with diet alone may be very difficult to achieve- studies show long-term success with dietary management in less than 10% of people. Attaining a healthy weight may be especially difficult to achieve in those with severe obesity. In some cases, medications, devices and surgical management might be considered.  What can you do?  If you are overweight or obese and are interested in methods for weight loss, you should discuss this with your provider.     Consider reducing daily calorie intake by 500 calories.     Keep a food journal.     Avoiding skipping meals, consider cutting portions instead.    Diet combined with exercise helps maintain muscle while optimizing fat loss. Strength training is particularly important for building and maintaining muscle mass. Exercise helps reduce stress, increase energy, and improves fitness.  Increasing exercise without diet control, however, may not burn enough calories to loose weight.       Start walking three days a week 10-20 minutes at a time    Work towards walking thirty minutes five days a week     Eventually, increase the speed of your walking for 1-2 minutes at time    In addition, we recommend that you review healthy lifestyles and methods for weight loss available through the National Institutes of Health patient information sites:  http://win.niddk.nih.gov/publications/index.htm    And look into health and wellness programs that may be available through your health insurance provider, employer, local community center, or saima club.    Weight management plan: Patient was referred to their PCP to discuss a diet and exercise plan.

## 2020-01-08 ENCOUNTER — OFFICE VISIT (OUTPATIENT)
Dept: CARDIOLOGY | Facility: CLINIC | Age: 50
End: 2020-01-08
Attending: INTERNAL MEDICINE
Payer: COMMERCIAL

## 2020-01-08 VITALS
WEIGHT: 219 LBS | HEART RATE: 54 BPM | BODY MASS INDEX: 29.66 KG/M2 | HEIGHT: 72 IN | OXYGEN SATURATION: 98 % | DIASTOLIC BLOOD PRESSURE: 75 MMHG | SYSTOLIC BLOOD PRESSURE: 135 MMHG

## 2020-01-08 DIAGNOSIS — I48.0 PAROXYSMAL ATRIAL FIBRILLATION (H): Primary | ICD-10-CM

## 2020-01-08 LAB — INTERPRETATION ECG - MUSE: NORMAL

## 2020-01-08 PROCEDURE — G0463 HOSPITAL OUTPT CLINIC VISIT: HCPCS | Mod: 25,ZF

## 2020-01-08 PROCEDURE — 99213 OFFICE O/P EST LOW 20 MIN: CPT | Mod: GC | Performed by: INTERNAL MEDICINE

## 2020-01-08 PROCEDURE — 93005 ELECTROCARDIOGRAM TRACING: CPT | Mod: ZF

## 2020-01-08 RX ORDER — METOPROLOL SUCCINATE 25 MG/1
25 TABLET, EXTENDED RELEASE ORAL DAILY
Qty: 90 TABLET | Refills: 3 | Status: SHIPPED | OUTPATIENT
Start: 2020-01-08 | End: 2021-01-05

## 2020-01-08 ASSESSMENT — PAIN SCALES - GENERAL: PAINLEVEL: NO PAIN (0)

## 2020-01-08 ASSESSMENT — MIFFLIN-ST. JEOR: SCORE: 1886.38

## 2020-01-08 NOTE — LETTER
1/8/2020      RE: Dimitris Carroll  [address]  [facility] [address]       Dear Colleague,    Thank you for the opportunity to participate in the care of your patient, Dimitris Carroll, at the [facility]. Please see a copy of my visit note below.    Electrophysiology Clinic Note  HPI:   Mr. Carroll is a 48 year old male who has a past medical history significant for PAF (CHADSVASC 0) s/p DCCV 10/2015 and 11/20/17, and MILAGROS (uses CPAP). He presents today for follow up.     He was first diagnosed with A.fib in October 2015 when he suddenly developed palpitations, dizziness, and dyspnea on 10/10/15. He presented to the urgent care and ultimately was sent to the ER on 10/12/15 where he was diagnosed with atrial fibrillation and underwent DCCV x4. The first 3 shocks (200 J) were followed by return of AF and he was subsequently loaded with 300 mg IV Amiodarone and cardioverted again which resulted in sustained NSR. His initial Echo showed LVEF 40-45%. He was started on Pradaxa, Metoprolol XL 25 mg,and a short course of amiodarone.He was also noted to have elevated TSH He was referred to EP for further management. He saw EP in 11/2015. His amiodarone had already been discontinued on 10/28/15. He denied any recurrence of atrial fibrillation. He had a CMRI done in November 2015 showed mild non-ischemic cardiomyopathy with LVEF of 53% and no DE. A stress echo done in December 2015 showed LVEF 55-60%. Cardiac event monitoring showed sinus throughout with rare PACs and PVCs. 9 triggered events all showed NSR some was PACs.  He underwent a sleep study which demonstrated sleep apnea for which he was started on home CPAP. He then had ER visit on 5/13/16 for AF with RVR. He was given 600 mg Rythmol and offered DCCV; however, patient declined. He was started on Xarelto with plan for outpatient DCCV if AF did not self convert. He did eventually convert and did  not require DCCV. He then stopped his Xarelto given no need for intervention.       He then presented to [facility] on 11/19/17 with his AF symptoms (palpitations, dizziness, and SANCHEZ). He reports onset of symptoms on 11/18/17 in the afternoon/evening. He was on a pill-in-the-pocket approach with Rythmol; however, he accidentally thought it was amiodarone and took an amiodarone pill. He took another amiodarone in the morning on 11/19/17 and when symptoms persisted he came into [facility] for evaluation. He was found in AF and was admitted for further management. He reported having more alcohol than usual this weekend about 5 drinks in a 6 hour period. He was started on Xarelto and underwent RYDER/DCCV on 11/20/17. He followed up with EP in 12/2017 and was doing well without any further AF episodes. He stopped Xarelto 1 month after his DCCV.   He presents today for follow up. He was last seen in clinic on 11/9/18. Since last visit, Lobito states he has been doing well. He has only had to use his pill-in-the-pocket propafenone once since last follow up. His symptoms abated and he did not come in for evaluation. He denies any chest pain/pressures, dizziness, lightheadedness, leg/ankle swelling, PND, orthopnea,or syncopal symptoms. He just started working out again last month. Presenting 12 lead ECG shows unusual p wave axis Vent Rate 53 bpm,  ms, QRS 90 ms,  ms. Current cardiac medications include: Toprol XL, ASA, and Propafenone pill-in-the pocket.He had a TTE 12/9/2018 that was normal.    PAST MEDICAL HISTORY:  Past Medical History:   Diagnosis Date     Allergic state      Atrial fibrillation (H)      Sleep apnea        CURRENT MEDICATIONS:  Current Outpatient Medications   Medication Sig Dispense Refill     aspirin (ASPIRIN LOW DOSE) 81 MG tablet Take 1 tablet (81 mg) by mouth daily 30 tablet 11     HEMP OIL OR EXTRACT OR OTHER CBD CANNABINOID, NOT MEDICAL CANNABIS,        metoprolol succinate ER (TOPROL-XL) 25 MG 24 hr  tablet Take 1 tablet (25 mg) by mouth daily 90 tablet 0     order for DME Equipment being ordered: travel Auto CPAP with minimum and maximum pressure settings at 12 cm water 1 Device 0     order for DME Equipment being ordered: CPAPPatient Dimitris Carroll was set up at [facility] on December 17, 2015. Patient received a Resmed AirSense 10 Auto. Pressures were set at Auto 7 - 12 cm H2O.   Patient s ramp is 5 cm H2O for Auto and FLEX/EPR is A Flex.  Patient received a [employer] Mask name: SIMPLUS FFmask Size Large, heated tubing and heated humidifier.  Patient is enrolled in the STM Program and does need to meet compliance. Patient has a follow up on TBD with Sole Oquendo NP.   Hailey Perdomo       UNABLE TO FIND MEDICATION NAME: CBO oil at bedtime       propafenone (RYTHMOL) 300 MG tablet Take 2 tablets (600 mg) by mouth once as needed For recurrence of atrial fibrillation. One dose per 24 hours as needed. (Patient not taking: Reported on 1/8/2020) 20 tablet 0       PAST SURGICAL HISTORY:  Past Surgical History:   Procedure Laterality Date     ANESTHESIA CARDIOVERSION N/A 11/20/2017    Procedure: ANESTHESIA CARDIOVERSION;  Cardioversion ;  Surgeon: GENERIC ANESTHESIA PROVIDER;  Location:  OR       ALLERGIES:     Allergies   Allergen Reactions     Milk Protein Extract GI Disturbance     Wheat Gluten [Gluten Meal] GI Disturbance       FAMILY HISTORY:  No family history on file.    SOCIAL HISTORY:  Social History     Tobacco Use     Smoking status: Never Smoker     Smokeless tobacco: Never Used   Substance Use Topics     Alcohol use: No     Alcohol/week: 0.0 standard drinks     Drug use: No       ROS:   A comprehensive 10 point review of systems negative other than as mentioned in HPI.  Exam:  /75 (BP Location: Right arm, Patient Position: Chair, Cuff Size: Adult Regular)   Pulse 54   Ht 1.829 m (6')   Wt 99.3 kg (219 lb)   SpO2 98%   BMI 29.70 kg/m     GENERAL APPEARANCE: alert and no  distress  HEENT: no icterus, no xanthelasmas, normal pupil size and reaction, normal palate, mucosa moist, no central cyanosis  NECK: no adenopathy, no asymmetry, masses, or scars, thyroid normal to palpation and no bruits, JVP not elevated  RESPIRATORY: lungs clear to auscultation - no rales, rhonchi or wheezes, no use of accessory muscles, no retractions, respirations are unlabored, normal respiratory rate  CARDIOVASCULAR: regular rhythm, normal S1 with physiologic split S2, no S3 or S4 and no murmur, click or rub, precordium quiet with normal PMI.  ABDOMEN: soft, non tender, bowel sounds normal, non-distended  EXTREMITIES: peripheral pulses normal, no edema  NEURO: alert and oriented to person/place/time, normal speech, gait and affect  SKIN: no ecchymoses, no rashes  PSYCH: normal affect, cooperative    Labs:  Reviewed.     Testing/Procedures:    12/2015 STRESS ECHOCARDIOGRAM:   Interpretation Summary  There is 1 mm ST segment depression in the Inferior leads.  No stress induced regional wall motion abnormalities. Normal resting LV  function with EF of approximately 60-65%; normal response to exercise with  increase to approximately 70-75%. Normal functional capacity.  ______________________________________________________________________________     Stress  Patient was given 4ml mixture of 1.5ml Definity and 8.5ml saline.  There is 1 mm ST segment depression in the Inferior leads.  Limiting [place]: fatigue.  Peak MVO2 33 ml/kg/min .  Percent predicted MVO2 112 %.  RPP 43951.  The maximum dose of atropine was 1.2mg.  Maximum workload 225 powers.  Target Heart Rate was achieved.  Exercise was stopped due to fatigue.  The patient did not exhibit any symptoms during exercise.  Normal blood pressure response with stress.  Normal heart rate response to exercise.     Rest  The baseline ECG displays normal sinus rhythm.     Stress Results                                       Maximum Predicted HR:  173 bpm             Target HR: 147 bpm        % Maximum Predicted HR:  89 %                      +--------+--------+----------+------+----+                   :  Stage :Duration:Heart Rate:  BPDos   e:                   :        : (mm:ss):   (bpm)  :      :    :                   +--------+--------+----------+------+----+                   :BASELINE:  0:00 55          :102/71:0.00:                   +--------+--------+----------+------+----+                   :  PEAK  :  15:23 1     54   :174/91:1.20:                   +--------+--------+----------+------+----+                        Stress Duration:  15:21 mm:ss *                    Maximum Stress HR:   154 bpm *        Left Ventricle  Left ventricular systolic function is normal.  Aortic Valve  The aortic valve is normal in structure and function.     Mitral Valve  The mitral valve is normal in structure and function.     Tricuspid Valve  The tricuspid valve is normal in structure and function.     Right Ventricle  The right ventricular systolic function is normal.     Pericardium  There is no pericardial effusion.    TTE 12/3/2019:  Interpretation Summary  Global and regional left ventricular function is normal with an EF of 60-65%.  Right ventricular function, chamber size, wall motion, and thickness are normal.  Both atria appear normal.  Dilation of the inferior vena cava is present with normal respiratory  variation in diameter.  No pericardial effusion is present.  LV function has normalized and patient in sinus rhythm.    Assessment and Plan:   Mr. Carroll is a 48 year old male who has a past medical history significant for PAF (CHADSVASC 0) s/p DCCV 10/2015 and 11/20/2017 and MILAGROS (uses CPAP). He presents today for follow up. He reports he has had to use his pill-in-the-pocket propafenone twice since last follow up. He took it in 1/2018 and 8/2018. Each time his symptoms abated and he did not come in for evaluation. His last RYDER did show LVEF 40-45% (in AF) and mild diffuse  hypokinesis with mild MR and TR.   Paroxysmal Atrial Fibrillation:   We discussed in detail with the patient management/treatment options for Kenji includin. Stroke Prophylaxis:  CHADSVASC=  0, corresponding to a 0.2-0.5% annual stroke / systemic emolism event rate. indicating no need for long term oral anticoagulation. He is on ASA only.   2. Rate Control: Continue Toprol XL.   3. Rhythm Control: Cardioversion, Antiarrhythmics and/or ablation are options for rhythm control. He had DCCV in 10/2015, recurrence in 2016 and was given Rythmol and eventually converted without DCCV. Then DCCV 17. He has been instructed to use Rythmol as pill-in-the-pocket for any AF.  He takes Propafenone 600 mg at onset of AF symptoms.  He has been instructed to call if he remains in AF for 24 hours or come in for evaluation immediately if he is unwell.   4. Risk Factor Management: maintain tight BP control and continued MILAGROS treatment (he is requesting a new mask which we will help arrange for him).    Follow up in 1 year.     The patient states understanding and is agreeable with plan.   This patient was seen and examined with Dr. Stern. The above note reflects our joint assessment and plan.     Jose Solares MD  Cardiovascular Fellow    EP STAFF NOTE  Patient seen and examined and management plan personally reviewed with the patient. I agree with the note above by the CV/EP fellow.  Calvin Stern MD [facility]  Cardiology - Electrophysiology

## 2020-01-08 NOTE — PROGRESS NOTES
Electrophysiology Clinic Note  HPI:   Mr. Carroll is a 48 year old male who has a past medical history significant for PAF (CHADSVASC 0) s/p DCCV 10/2015 and 11/20/17, and MILAGROS (uses CPAP). He presents today for follow up.     He was first diagnosed with A.fib in October 2015 when he suddenly developed palpitations, dizziness, and dyspnea on 10/10/15. He presented to the urgent care and ultimately was sent to the ER on 10/12/15 where he was diagnosed with atrial fibrillation and underwent DCCV x4. The first 3 shocks (200 J) were followed by return of AF and he was subsequently loaded with 300 mg IV Amiodarone and cardioverted again which resulted in sustained NSR. His initial Echo showed LVEF 40-45%. He was started on Pradaxa, Metoprolol XL 25 mg,and a short course of amiodarone.He was also noted to have elevated TSH He was referred to EP for further management. He saw EP in 11/2015. His amiodarone had already been discontinued on 10/28/15. He denied any recurrence of atrial fibrillation. He had a CMRI done in November 2015 showed mild non-ischemic cardiomyopathy with LVEF of 53% and no DE. A stress echo done in December 2015 showed LVEF 55-60%. Cardiac event monitoring showed sinus throughout with rare PACs and PVCs. 9 triggered events all showed NSR some was PACs.  He underwent a sleep study which demonstrated sleep apnea for which he was started on home CPAP. He then had ER visit on 5/13/16 for AF with RVR. He was given 600 mg Rythmol and offered DCCV; however, patient declined. He was started on Xarelto with plan for outpatient DCCV if AF did not self convert. He did eventually convert and did not require DCCV. He then stopped his Xarelto given no need for intervention.       He then presented to [facility] on 11/19/17 with his AF symptoms (palpitations, dizziness, and SANCHEZ). He reports onset of symptoms on 11/18/17 in the afternoon/evening. He was on a pill-in-the-pocket approach with Rythmol; however, he accidentally  thought it was amiodarone and took an amiodarone pill. He took another amiodarone in the morning on 11/19/17 and when symptoms persisted he came into [facility] for evaluation. He was found in AF and was admitted for further management. He reported having more alcohol than usual this weekend about 5 drinks in a 6 hour period. He was started on Xarelto and underwent RYDER/DCCV on 11/20/17. He followed up with EP in 12/2017 and was doing well without any further AF episodes. He stopped Xarelto 1 month after his DCCV.   He presents today for follow up. He was last seen in clinic on 11/9/18. Since last visit, Lobito states he has been doing well. He has only had to use his pill-in-the-pocket propafenone once since last follow up. His symptoms abated and he did not come in for evaluation. He denies any chest pain/pressures, dizziness, lightheadedness, leg/ankle swelling, PND, orthopnea,or syncopal symptoms. He just started working out again last month. Presenting 12 lead ECG shows unusual p wave axis Vent Rate 53 bpm,  ms, QRS 90 ms,  ms. Current cardiac medications include: Toprol XL, ASA, and Propafenone pill-in-the pocket.He had a TTE 12/9/2018 that was normal.    PAST MEDICAL HISTORY:  Past Medical History:   Diagnosis Date     Allergic state      Atrial fibrillation (H)      Sleep apnea        CURRENT MEDICATIONS:  Current Outpatient Medications   Medication Sig Dispense Refill     aspirin (ASPIRIN LOW DOSE) 81 MG tablet Take 1 tablet (81 mg) by mouth daily 30 tablet 11     HEMP OIL OR EXTRACT OR OTHER CBD CANNABINOID, NOT MEDICAL CANNABIS,        metoprolol succinate ER (TOPROL-XL) 25 MG 24 hr tablet Take 1 tablet (25 mg) by mouth daily 90 tablet 0     order for DME Equipment being ordered: travel Auto CPAP with minimum and maximum pressure settings at 12 cm water 1 Device 0     order for DME Equipment being ordered: CPAPPatient Dimitris Carroll was set up at [facility] on December 17, 2015. Patient received a  Resmed AirSense 10 Auto. Pressures were set at Auto 7 - 12 cm H2O.   Patient s ramp is 5 cm H2O for Auto and FLEX/EPR is A Flex.  Patient received a [employer] Mask name: SIMPLUS FFmask Size Large, heated tubing and heated humidifier.  Patient is enrolled in the [facility] Program and does need to meet compliance. Patient has a follow up on TBD with Sole Oquendo NP.   Hailey Conor       UNABLE TO FIND MEDICATION NAME: CBO oil at bedtime       propafenone (RYTHMOL) 300 MG tablet Take 2 tablets (600 mg) by mouth once as needed For recurrence of atrial fibrillation. One dose per 24 hours as needed. (Patient not taking: Reported on 1/8/2020) 20 tablet 0       PAST SURGICAL HISTORY:  Past Surgical History:   Procedure Laterality Date     ANESTHESIA CARDIOVERSION N/A 11/20/2017    Procedure: ANESTHESIA CARDIOVERSION;  Cardioversion ;  Surgeon: GENERIC ANESTHESIA PROVIDER;  Location:  OR       ALLERGIES:     Allergies   Allergen Reactions     Milk Protein Extract GI Disturbance     Wheat Gluten [Gluten Meal] GI Disturbance       FAMILY HISTORY:  No family history on file.    SOCIAL HISTORY:  Social History     Tobacco Use     Smoking status: Never Smoker     Smokeless tobacco: Never Used   Substance Use Topics     Alcohol use: No     Alcohol/week: 0.0 standard drinks     Drug use: No       ROS:   A comprehensive 10 point review of systems negative other than as mentioned in HPI.  Exam:  /75 (BP Location: Right arm, Patient Position: Chair, Cuff Size: Adult Regular)   Pulse 54   Ht 1.829 m (6')   Wt 99.3 kg (219 lb)   SpO2 98%   BMI 29.70 kg/m    GENERAL APPEARANCE: alert and no distress  HEENT: no icterus, no xanthelasmas, normal pupil size and reaction, normal palate, mucosa moist, no central cyanosis  NECK: no adenopathy, no asymmetry, masses, or scars, thyroid normal to palpation and no bruits, JVP not elevated  RESPIRATORY: lungs clear to auscultation - no rales, rhonchi or wheezes, no use of accessory  muscles, no retractions, respirations are unlabored, normal respiratory rate  CARDIOVASCULAR: regular rhythm, normal S1 with physiologic split S2, no S3 or S4 and no murmur, click or rub, precordium quiet with normal PMI.  ABDOMEN: soft, non tender, bowel sounds normal, non-distended  EXTREMITIES: peripheral pulses normal, no edema  NEURO: alert and oriented to person/place/time, normal speech, gait and affect  SKIN: no ecchymoses, no rashes  PSYCH: normal affect, cooperative    Labs:  Reviewed.     Testing/Procedures:    12/2015 STRESS ECHOCARDIOGRAM:   Interpretation Summary  There is 1 mm ST segment depression in the Inferior leads.  No stress induced regional wall motion abnormalities. Normal resting LV  function with EF of approximately 60-65%; normal response to exercise with  increase to approximately 70-75%. Normal functional capacity.  ______________________________________________________________________________     Stress  Patient was given 4ml mixture of 1.5ml Definity and 8.5ml saline.  There is 1 mm ST segment depression in the Inferior leads.  Limiting Sympton: fatigue.  Peak MVO2 33 ml/kg/min .  Percent predicted MVO2 112 %.  RPP 87462.  The maximum dose of atropine was 1.2mg.  Maximum workload 225 powers.  Target Heart Rate was achieved.  Exercise was stopped due to fatigue.  The patient did not exhibit any symptoms during exercise.  Normal blood pressure response with stress.  Normal heart rate response to exercise.     Rest  The baseline ECG displays normal sinus rhythm.     Stress Results                                       Maximum Predicted HR:  173 bpm            Target HR: 147 bpm        % Maximum Predicted HR:  89 %                      +--------+--------+----------+------+----+                   :  Stage :Duration:Heart Rate:  BPDos   e:                   :        : (mm:ss):   (bpm)  :      :    :                   +--------+--------+----------+------+----+                   :BASELINE:   0:00 55          :102/71:0.00:                   +--------+--------+----------+------+----+                   :  PEAK  :  15:23 1     54   :174/91:1.20:                   +--------+--------+----------+------+----+                        Stress Duration:  15:21 mm:ss *                    Maximum Stress HR:   154 bpm *        Left Ventricle  Left ventricular systolic function is normal.  Aortic Valve  The aortic valve is normal in structure and function.     Mitral Valve  The mitral valve is normal in structure and function.     Tricuspid Valve  The tricuspid valve is normal in structure and function.     Right Ventricle  The right ventricular systolic function is normal.     Pericardium  There is no pericardial effusion.    TTE 12/3/2019:  Interpretation Summary  Global and regional left ventricular function is normal with an EF of 60-65%.  Right ventricular function, chamber size, wall motion, and thickness are normal.  Both atria appear normal.  Dilation of the inferior vena cava is present with normal respiratory  variation in diameter.  No pericardial effusion is present.  LV function has normalized and patient in sinus rhythm.    Assessment and Plan:   Mr. Carroll is a 48 year old male who has a past medical history significant for PAF (CHADSVASC 0) s/p DCCV 10/2015 and 2017 and MILAGROS (uses CPAP). He presents today for follow up. He reports he has had to use his pill-in-the-pocket propafenone twice since last follow up. He took it in 2018 and 2018. Each time his symptoms abated and he did not come in for evaluation. His last RYDER did show LVEF 40-45% (in AF) and mild diffuse hypokinesis with mild MR and TR.   Paroxysmal Atrial Fibrillation:   We discussed in detail with the patient management/treatment options for Kenji includin. Stroke Prophylaxis:  CHADSVASC=  0, corresponding to a 0.2-0.5% annual stroke / systemic emolism event rate. indicating no need for long term oral anticoagulation. He is on  ASA only.   2. Rate Control: Continue Toprol XL.   3. Rhythm Control: Cardioversion, Antiarrhythmics and/or ablation are options for rhythm control. He had DCCV in 10/2015, recurrence in 5/2016 and was given Rythmol and eventually converted without DCCV. Then DCCV 11/20/17. He has been instructed to use Rythmol as pill-in-the-pocket for any AF.  He takes Propafenone 600 mg at onset of AF symptoms.  He has been instructed to call if he remains in AF for 24 hours or come in for evaluation immediately if he is unwell.   4. Risk Factor Management: maintain tight BP control and continued MILAGROS treatment (he is requesting a new mask which we will help arrange for him).    Follow up in 1 year.     The patient states understanding and is agreeable with plan.   This patient was seen and examined with Dr. Stern. The above note reflects our joint assessment and plan.     Jose Solares MD  Cardiovascular Fellow    EP STAFF NOTE  Patient seen and examined and management plan personally reviewed with the patient. I agree with the note above by the CV/EP fellow.  Calvin Stern MD [facility]  Cardiology - Electrophysiology

## 2020-01-08 NOTE — NURSING NOTE
Chief Complaint   Patient presents with     Follow Up     1 yr follow-up paroxysmal atrial fibrillation on propafenone pill-in-pocket.     Vitals were taken and medications were reconciled. EKG was performed.    Coty Doe CMA    8:43 AM

## 2020-03-14 DIAGNOSIS — I48.0 PAROXYSMAL ATRIAL FIBRILLATION (H): ICD-10-CM

## 2020-03-18 RX ORDER — METOPROLOL SUCCINATE 25 MG/1
TABLET, EXTENDED RELEASE ORAL
Qty: 90 TABLET | Refills: 3 | OUTPATIENT
Start: 2020-03-18

## 2020-11-14 ENCOUNTER — HEALTH MAINTENANCE LETTER (OUTPATIENT)
Age: 50
End: 2020-11-14

## 2020-12-14 VITALS — BODY MASS INDEX: 25.36 KG/M2 | HEIGHT: 75 IN | WEIGHT: 204 LBS

## 2020-12-14 ASSESSMENT — MIFFLIN-ST. JEOR: SCORE: 1860.97

## 2020-12-14 NOTE — PATIENT INSTRUCTIONS
Your BMI is Body mass index is 25.5 kg/m .  Weight management is a personal decision.  If you are interested in exploring weight loss strategies, the following discussion covers the approaches that may be successful. Body mass index (BMI) is one way to tell whether you are at a healthy weight, overweight, or obese. It measures your weight in relation to your height.  A BMI of 18.5 to 24.9 is in the healthy range. A person with a BMI of 25 to 29.9 is considered overweight, and someone with a BMI of 30 or greater is considered obese. More than two-thirds of American adults are considered overweight or obese.  Being overweight or obese increases the risk for further weight gain. Excess weight may lead to heart disease and diabetes.  Creating and following plans for healthy eating and physical activity may help you improve your health.  Weight control is part of healthy lifestyle and includes exercise, emotional health, and healthy eating habits. Careful eating habits lifelong are the mainstay of weight control. Though there are significant health benefits from weight loss, long-term weight loss with diet alone may be very difficult to achieve- studies show long-term success with dietary management in less than 10% of people. Attaining a healthy weight may be especially difficult to achieve in those with severe obesity. In some cases, medications, devices and surgical management might be considered.  What can you do?  If you are overweight or obese and are interested in methods for weight loss, you should discuss this with your provider.     Consider reducing daily calorie intake by 500 calories.     Keep a food journal.     Avoiding skipping meals, consider cutting portions instead.    Diet combined with exercise helps maintain muscle while optimizing fat loss. Strength training is particularly important for building and maintaining muscle mass. Exercise helps reduce stress, increase energy, and improves fitness.  Increasing exercise without diet control, however, may not burn enough calories to loose weight.       Start walking three days a week 10-20 minutes at a time    Work towards walking thirty minutes five days a week     Eventually, increase the speed of your walking for 1-2 minutes at time    In addition, we recommend that you review healthy lifestyles and methods for weight loss available through the National [employer] of [employer] patient information sites:  http://win.niddk.nih.gov/publications/index.htm    And look into health and wellness programs that may be available through your health insurance provider, employer, local community center, or saima club.

## 2020-12-15 ENCOUNTER — VIRTUAL VISIT (OUTPATIENT)
Dept: SLEEP MEDICINE | Facility: CLINIC | Age: 50
End: 2020-12-15
Payer: COMMERCIAL

## 2020-12-15 DIAGNOSIS — G47.33 OSA ON CPAP: Primary | ICD-10-CM

## 2020-12-15 PROCEDURE — 99214 OFFICE O/P EST MOD 30 MIN: CPT | Mod: 95 | Performed by: INTERNAL MEDICINE

## 2020-12-19 NOTE — PROGRESS NOTES
"Dimitris Carroll is a 50 year old male who is being evaluated via a billable video visit.      The patient has been notified of following:     \"This video visit will be conducted via a call between you and your physician/provider. We have found that certain health care needs can be provided without the need for an in-person physical exam.  This service lets us provide the care you need with a video conversation.  If a prescription is necessary we can send it directly to your pharmacy.  If lab work is needed we can place an order for that and you can then stop by our lab to have the test done at a later time.    Video visits are billed at different rates depending on your insurance coverage.  Please reach out to your insurance provider with any questions.    If during the course of the call the physician/provider feels a video visit is not appropriate, you will not be charged for this service.\"    Patient has given verbal consent for Video visit? Yes  How would you like to obtain your AVS? MyChart  If you are dropped from the video visit, the video invite should be resent to: Send to e-mail at: [email or web address]  Will anyone else be joining your video visit? No        Video-Visit Details    Type of service:  Video Visit    Video Start Time: 1:14 PM  Video End Time: 1:37 PM    Originating Location (pt. Location): Home    Distant Location (provider location):  [facility] SLE[facility]     Platform used for Video Visit: Riki Antoine MD  [facility]   Floor 1, [address]   [address] [place], [place] [address]. [place] [address]   Appointments: [phone]      Sleep clinic follow up visit note:    Date on this visit: 12/15/2020    Primary Physician: Lenin Bermudez      Purpose of visit: Follow-up of MILAGROS     History of present illness: Mr. Carroll is a 50 year old male with past medical history significant for " PAF, s/p DCCV 10/2015 and 11/20/17, and MILAGROS. He presents to sleep clinic for a virtual visit today for f/u of MILAGROS. Date  of last sleep clinic visit was 12/9/2019.  He is currently being treated with the CPAP device with pressure setting fixed at 12 cm of water.  He uses a full facemask. He  reports using the CPAP regularly during sleep. There are no reports of snoring or awakenings due to gasping for air or choking with the CPAP use. Pressure settings feel comfortable.   He goes to bed between 10:30-11:00PM and wakes up between 4:30-5:00AM. Sometime falls back asleep gets 1-2 more hours of sleep. He does not always put the mask back on when he wakes up between 4:30-5:00AM. Since his last sleep clinic visit he reports he has been sleeping better and longer. He has been following heathy diet and exercising regularly  and reports weight loss of  22 lbs ( from 226 lbs to 204 lbs).    He denies fatigue or  excessive daytime sleepiness. He endorses an Nicolaus sleepiness score of 11 out of 24.  He denies any drowsiness while driving.     Downloadable compliance data:  From 11/9/20-12/8/2020  showed that he has used the device for 28 out of 30 days with 67% days with usage of >=4 hours.  95th percentile for air leak was 59.5 l/min.  Residual AHI was 1.2 /h.     Previous sleep study report:  (December 16, 2015)  Weight: 206 pounds:  Moderate obstructive sleep apnea.  Respiration: During the diagnostic portion of the study, the polysomnogram revealed no apneas.  There were 58 hypopneas resulting in a hypopnea index of 25.9 events per hour. Most hypopneas were related to snoring.  The combined apnea/hypopnea index was 25.9 events per hour.  The REM AHI was 0 events per hour.  The supine AHI was 25.9 events per hour.  The RERA index was 0 events per hour.  The RDI was 25.9 events per hour. Snoring - was reported as rare, mild snoring while supine. Respiratory rate and pattern - was notable for normal respiratory rate and  pattern.Sustained Sleep Associated Hypoventilation - Transcutaneous carbon dioxide monitoring was not used, however significant hypoventilation was not suggested by oximetry. Sleep Associated Hypoxemia - (Greater than 5 minutes O2 sat below 89%) was not present.  Baseline oxygen saturation was 95.2%. Lowest oxygen saturation was 89.0%.  Time spent below 89% was 0 minutes.      Allergies:    Allergies   Allergen Reactions     Milk Protein Extract GI Disturbance     Wheat Gluten [Gluten Meal] GI Disturbance       Medications:    Current Outpatient Medications   Medication Sig Dispense Refill     aspirin (ASPIRIN LOW DOSE) 81 MG tablet Take 1 tablet (81 mg) by mouth daily 30 tablet 11     HEMP OIL OR EXTRACT OR OTHER CBD CANNABINOID, NOT MEDICAL CANNABIS,        UNABLE TO FIND MEDICATION NAME: CBO oil at bedtime       metoprolol succinate ER (TOPROL-XL) 25 MG 24 hr tablet Take 1 tablet (25 mg) by mouth daily (Patient not taking: Reported on 12/14/2020) 90 tablet 3     order for DME Equipment being ordered: CPAPPatient Dimitris Carroll was set up at [facility] on December 17, 2015. Patient received a [employer] AirSense 10 Auto. Pressures were set at Auto 7 - 12 cm H2O.   Patient s ramp is 5 cm H2O for Auto and FLEX/EPR is A Flex.  Patient received a [employer] Mask name: SIMPLUS FFmask Size Large, heated tubing and heated humidifier.  Patient is enrolled in the STM Program and does need to meet compliance. Patient has a follow up on TBD with Sole Oquendo NP.   Hailey Perdomo       propafenone (RYTHMOL) 300 MG tablet Take 2 tablets (600 mg) by mouth once as needed For recurrence of atrial fibrillation. One dose per 24 hours as needed. (Patient not taking: Reported on 1/8/2020) 20 tablet 0       Problem List:  Patient Active Problem List    Diagnosis Date Noted     Chest pain 04/18/2019     Priority: Medium     Atrial fibrillation (H) 11/20/2017     Priority: Medium     Insomnia due to psychological stress 08/22/2016      Priority: Medium     MILAGROS (obstructive sleep apnea) 01/26/2016     Priority: Medium     Paroxysmal atrial fibrillation (H) 11/17/2015     Priority: Medium        Past Medical/Surgical History:  Past Medical History:   Diagnosis Date     Allergic state      Atrial fibrillation (H)      Sleep apnea      Past Surgical History:   Procedure Laterality Date     ANESTHESIA CARDIOVERSION N/A 11/20/2017    Procedure: ANESTHESIA CARDIOVERSION;  Cardioversion ;  Surgeon: GENERIC ANESTHESIA PROVIDER;  Location:  OR       Social History:  Social History     Socioeconomic History     Marital status:      Spouse name: Not on file     Number of children: Not on file     Years of education: Not on file     Highest education level: Not on file   Occupational History     Not on file   Social Needs     Financial resource strain: Not on file     Food insecurity     Worry: Not on file     Inability: Not on file     Transportation needs     Medical: Not on file     Non-medical: Not on file   Tobacco Use     Smoking status: Never Smoker     Smokeless tobacco: Never Used   Substance and Sexual Activity     Alcohol use: No     Alcohol/week: 0.0 standard drinks     Drug use: No     Sexual activity: Not on file   Lifestyle     Physical activity     Days per week: Not on file     Minutes per session: Not on file     Stress: Not on file   Relationships     Social connections     Talks on phone: Not on file     Gets together: Not on file     Attends Gnosticism service: Not on file     Active member of club or organization: Not on file     Attends meetings of clubs or organizations: Not on file     Relationship status: Not on file     Intimate partner violence     Fear of current or ex partner: Not on file     Emotionally abused: Not on file     Physically abused: Not on file     Forced sexual activity: Not on file   Other Topics Concern     Parent/sibling w/ CABG, MI or angioplasty before 65F 55M? Not Asked   Social History Narrative  "    Not on file       Family History:  No family history on file.      Physical Examination:  Vitals: Ht 1.905 m (6' 3\")   Wt 92.5 kg (204 lb)   BMI 25.50 kg/m    BMI= Body mass index is 25.5 kg/m .      [place]heat[place] Total Score 12/14/2020   Total score - [place] 11     General: No apparent distress, appropriately groomed  Head: Normocephalic, atraumatic  Chest: No cough, no audible wheezing, able to talk in full sentences  Skin: no rash  Psych: coherent speech, normal rate and volume, able to articulate logical thoughts, able   to abstract reason, no tangential thoughts, no hallucinations   or delusions  His  affect is normal  Neuro:  Mental status: Alert and  Oriented X 3  Speech: normal        Impression/Report/Plan:  1. Obstructive sleep apnea: Pt reports  compliance with PAP therapy and reports positive benefits with PAP use.   MILAGROS is adequately controlled with CPAP at the current settings per compliance DL.   Prescription provided for renewal of PAP supplies.  Recommended him to continue using the CPAP regularly during sleep and to use the device fore the entire duration of sleep, to derive maximum benefit.   Air leaks: He was instructed  to get the supplies for the PAP replaced regularly.Hopefully the airleaks will reduce once mask gets replaced.  Patient instructed to remember to bring PAP with him/her if hospitalized and if anticipating procedure that requires sedation/surgery to be sure to discuss with the provider/surgeon that he  has sleep apnea and uses PAP therapy.    2.  Paroxysmal atrial fibrillation: Recommended continue to follow-up with cardiology.    Recommended him to increase his sleep duration to  7-8 hours per night.    Encouraged healthy diet, and exercise.    Patient was strongly advised to avoid driving, operating any heavy machinery or other hazardous situations while drowsy or sleepy.  Patient was counseled on the importance of driving while alert, to pull over if drowsy, or nap before " getting into the vehicle if sleepy.      Plan is to follow up in 12 months or sooner if any concerns.     The above note was dictated using voice recognition software. Although reviewed after completion, some word and grammatical error may remain . Please contact the author for any clarifications.    Lynette Antoine MD  [facility]   Floor 1, [address]   [address] [place] [address]   Appointments: [phone]

## 2021-01-13 ENCOUNTER — OFFICE VISIT (OUTPATIENT)
Dept: CARDIOLOGY | Facility: CLINIC | Age: 51
End: 2021-01-13
Attending: INTERNAL MEDICINE
Payer: COMMERCIAL

## 2021-01-13 VITALS
WEIGHT: 207.9 LBS | OXYGEN SATURATION: 99 % | DIASTOLIC BLOOD PRESSURE: 78 MMHG | HEART RATE: 49 BPM | HEIGHT: 75 IN | SYSTOLIC BLOOD PRESSURE: 116 MMHG | BODY MASS INDEX: 25.85 KG/M2

## 2021-01-13 DIAGNOSIS — I48.0 PAROXYSMAL ATRIAL FIBRILLATION (H): Primary | ICD-10-CM

## 2021-01-13 PROCEDURE — 93005 ELECTROCARDIOGRAM TRACING: CPT

## 2021-01-13 PROCEDURE — 99214 OFFICE O/P EST MOD 30 MIN: CPT | Mod: 25 | Performed by: INTERNAL MEDICINE

## 2021-01-13 PROCEDURE — G0463 HOSPITAL OUTPT CLINIC VISIT: HCPCS

## 2021-01-13 ASSESSMENT — MIFFLIN-ST. JEOR: SCORE: 1878.66

## 2021-01-13 ASSESSMENT — PAIN SCALES - GENERAL: PAINLEVEL: NO PAIN (0)

## 2021-01-13 NOTE — LETTER
1/13/2021      RE: Dimitris Carroll  [address]  [facility] [address]       Dear Colleague,    Thank you for the opportunity to participate in the care of your patient, Dimitris Carroll, at the [facility] [place] [facility]. Please see a copy of my visit note below.    Electrophysiology Clinic Note  HPI:   Mr. Carroll is a 50 year old male who has a past medical history significant for PAF (CHADSVASC 0) s/p DCCV 10/2015 and 11/20/17, and MILAGROS (uses CPAP). He presents today for follow up.     He was first diagnosed with A.fib in October 2015 when he suddenly developed palpitations, dizziness, and dyspnea on 10/10/15. He presented to the urgent care and ultimately was sent to the ER on 10/12/15 where he was diagnosed with atrial fibrillation and underwent DCCV x4. The first 3 shocks (200 J) were followed by return of AF and he was subsequently loaded with 300 mg IV Amiodarone and cardioverted again which resulted in sustained NSR. His initial Echo showed LVEF 40-45%. He was started on Pradaxa, Metoprolol XL 25 mg,and a short course of amiodarone.He was also noted to have elevated TSH He was referred to EP for further management. He saw EP in 11/2015. His amiodarone had already been discontinued on 10/28/15. He denied any recurrence of atrial fibrillation. He had a CMRI done in November 2015 showed mild non-ischemic cardiomyopathy with LVEF of 53% and no DE. A stress echo done in December 2015 showed LVEF 55-60%. Cardiac event monitoring showed sinus throughout with rare PACs and PVCs. 9 triggered events all showed NSR some was PACs.  He underwent a sleep study which demonstrated sleep apnea for which he was started on home CPAP. He then had ER visit on 5/13/16 for AF with RVR. He was given 600 mg Rythmol and offered DCCV; however, patient declined. He was started on Xarelto with plan for outpatient DCCV if AF did not self convert. He did eventually  convert and did not require DCCV. He then stopped his Xarelto given no need for intervention.     He then presented to [facility] on 11/19/17 with his AF symptoms (palpitations, dizziness, and SANCHEZ). He reports onset of symptoms on 11/18/17 in the afternoon/evening. He was on a pill-in-the-pocket approach with Rythmol; however, he accidentally thought it was amiodarone and took an amiodarone pill. He took another amiodarone in the morning on 11/19/17 and when symptoms persisted he came into [facility] for evaluation. He was found in AF and was admitted for further management. He reported having more alcohol than usual this weekend about 5 drinks in a 6 hour period. He was started on Xarelto and underwent RYDER/DCCV on 11/20/17. He followed up with EP in 12/2017 and was doing well without any further AF episodes. He stopped Xarelto 1 month after his DCCV.    EP Visit 1/2020: He presents today for follow up. He was last seen in clinic on 11/9/18. Since last visit, Lobito states he has been doing well. He has only had to use his pill-in-the-pocket propafenone once since last follow up. His symptoms abated and he did not come in for evaluation. He denies any chest pain/pressures, dizziness, lightheadedness, leg/ankle swelling, PND, orthopnea,or syncopal symptoms. He just started working out again last month. Presenting 12 lead ECG shows unusual p wave axis Vent Rate 53 bpm,  ms, QRS 90 ms,  ms. Current cardiac medications include: Toprol XL, ASA, and Propafenone pill-in-the pocket.He had a TTE 12/9/2018 that was normal.    EP Visit 1/2021: Patient presents today for follow up. He is feeling well. Over the past year he got motivated to become more healthy--he started exercising and paying attention to his diet, ended up losing 20 lbs. He has not gone into atrial fibrillation over the past year. Has been working from home, trying to avoid getting corona virus.    PAST MEDICAL HISTORY:  Past Medical History:   Diagnosis Date      "Allergic state      Atrial fibrillation (H)      Sleep apnea        CURRENT MEDICATIONS:  Current Outpatient Medications   Medication Sig Dispense Refill     aspirin (ASPIRIN LOW DOSE) 81 MG tablet Take 1 tablet (81 mg) by mouth daily 30 tablet 11     HEMP OIL OR EXTRACT OR OTHER CBD CANNABINOID, NOT MEDICAL CANNABIS,        order for DME Equipment being ordered: CPAPPatient Dimitris Carroll was set up at [facility] on December 17, 2015. Patient received a [employer] AirSense 10 Auto. Pressures were set at Auto 7 - 12 cm H2O.   Patient s ramp is 5 cm H2O for Auto and FLEX/EPR is A Flex.  Patient received a [employer] Mask name: SIMPLUS FFmask Size Large, heated tubing and heated humidifier.  Patient is enrolled in the STM Program and does need to meet compliance. Patient has a follow up on TBD with Sole Oquendo NP.   Hailey Perdomo       propafenone (RYTHMOL) 300 MG tablet Take 2 tablets (600 mg) by mouth once as needed For recurrence of atrial fibrillation. One dose per 24 hours as needed. 20 tablet 0     UNABLE TO FIND MEDICATION NAME: CBO oil at bedtime         PAST SURGICAL HISTORY:  Past Surgical History:   Procedure Laterality Date     ANESTHESIA CARDIOVERSION N/A 11/20/2017    Procedure: ANESTHESIA CARDIOVERSION;  Cardioversion ;  Surgeon: GENERIC ANESTHESIA PROVIDER;  Location:  OR       ALLERGIES:     Allergies   Allergen Reactions     Milk Protein Extract GI Disturbance     Wheat Gluten [Gluten Meal] GI Disturbance       FAMILY HISTORY:  No family history on file.    SOCIAL HISTORY:  Social History     Tobacco Use     Smoking status: Never Smoker     Smokeless tobacco: Never Used   Substance Use Topics     Alcohol use: No     Alcohol/week: 0.0 standard drinks     Drug use: No       ROS:   A comprehensive 10 point review of systems negative other than as mentioned in HPI.  Exam:  /78 (BP Location: Right arm, Patient Position: Chair, Cuff Size: Adult Large)   Pulse (!) 49   Ht 1.905 m (6' 3\")   " Wt 94.3 kg (207 lb 14.4 oz)   SpO2 99%   BMI 25.99 kg/m       GENERAL APPEARANCE: alert and no distress  HEENT: no icterus, no xanthelasmas, normal pupil size and reaction, normal palate, mucosa moist, no central cyanosis  NECK: no adenopathy, no asymmetry, masses, or scars, thyroid normal to palpation and no bruits, JVP not elevated  RESPIRATORY: lungs clear to auscultation - no rales, rhonchi or wheezes, no use of accessory muscles, no retractions, respirations are unlabored, normal respiratory rate  CARDIOVASCULAR: regular rhythm, normal S1 with physiologic split S2, no S3 or S4 and no murmur, click or rub, precordium quiet with normal PMI.  ABDOMEN: soft, non tender, bowel sounds normal, non-distended  EXTREMITIES: peripheral pulses normal, no edema  NEURO: alert and oriented to person/place/time, normal speech, gait and affect  SKIN: no ecchymoses, no rashes  PSYCH: normal affect, cooperative    Labs:  Reviewed.     Testing/Procedures:    12/2015 STRESS ECHOCARDIOGRAM:   Interpretation Summary  There is 1 mm ST segment depression in the Inferior leads.  No stress induced regional wall motion abnormalities. Normal resting LV  function with EF of approximately 60-65%; normal response to exercise with  increase to approximately 70-75%. Normal functional capacity.  ______________________________________________________________________________     Stress  Patient was given 4ml mixture of 1.5ml Definity and 8.5ml saline.  There is 1 mm ST segment depression in the Inferior leads.  Limiting Sympton: fatigue.  Peak MVO2 33 ml/kg/min .  Percent predicted MVO2 112 %.  RPP 20658.  The maximum dose of atropine was 1.2mg.  Maximum workload 225 powers.  Target Heart Rate was achieved.  Exercise was stopped due to fatigue.  The patient did not exhibit any symptoms during exercise.  Normal blood pressure response with stress.  Normal heart rate response to exercise.     Rest  The baseline ECG displays normal sinus  rhythm.     Stress Results                                       Maximum Predicted HR:  173 bpm            Target HR: 147 bpm        % Maximum Predicted HR:  89 %                      +--------+--------+----------+------+----+                   :  Stage :Duration:Heart Rate:  BPDos   e:                   :        : (mm:ss):   (bpm)  :      :    :                   +--------+--------+----------+------+----+                   :BASELINE:  0:00 55          :102/71:0.00:                   +--------+--------+----------+------+----+                   :  PEAK  :  15:23 1     54   :174/91:1.20:                   +--------+--------+----------+------+----+                        Stress Duration:  15:21 mm:ss *                    Maximum Stress HR:   154 bpm *        Left Ventricle  Left ventricular systolic function is normal.  Aortic Valve  The aortic valve is normal in structure and function.     Mitral Valve  The mitral valve is normal in structure and function.     Tricuspid Valve  The tricuspid valve is normal in structure and function.     Right Ventricle  The right ventricular systolic function is normal.     Pericardium  There is no pericardial effusion.    TTE 12/3/2019:  Interpretation Summary  Global and regional left ventricular function is normal with an EF of 60-65%.  Right ventricular function, chamber size, wall motion, and thickness are normal.  Both atria appear normal.  Dilation of the inferior vena cava is present with normal respiratory  variation in diameter.  No pericardial effusion is present.  LV function has normalized and patient in sinus rhythm.    Assessment and Plan:   Mr. Carroll is a 50 year old male who has a past medical history significant for PAF (CHADSVASC 0) s/p DCCV 10/2015 and 11/20/2017 and MILAGROS (uses CPAP). He presents today for follow up--since last year he has not had any kaylyn episodes of atrial fibrillation, likely due to losing weight, exercising, and eating better.  Paroxysmal  Atrial Fibrillation:   We discussed in detail with the patient management/treatment options for Kenji includin. Stroke Prophylaxis:  CHADSVASC=  0, corresponding to a 0.2-0.5% annual stroke / systemic emolism event rate. indicating no need for long term oral anticoagulation. He is on ASA only.   2. Rate Control: None.  3. Rhythm Control: Cardioversion, Antiarrhythmics and/or ablation are options for rhythm control. He had DCCV in 10/2015, recurrence in 2016 and was given Rythmol and eventually converted without DCCV. Then DCCV 17. He has been instructed to use Rythmol as pill-in-the-pocket for any AF.  He takes Propafenone 600 mg at onset of AF symptoms.  He has been instructed to call if he remains in AF for 24 hours or come in for evaluation immediately if he is unwell. Will continue this.  4. Risk Factor Management: maintain tight BP control and continued MILAGROS treatment. Continue with diet/exercising that he has been doing well with.      Follow up in 1 year.     The patient states understanding and is agreeable with plan.     This patient was seen and examined with Dr. Stern. The above note reflects our joint assessment and plan.     Dajuan Pena MD PhD  Cardiology Fellow  P: Text Page     EP STAFF NOTE  Patient seen and examined and management plan personally reviewed with the patient. I agree with the note above by the CV/EP fellow.  Calvin Stern MD [facility]  Cardiology - Electrophysiology        Please do not hesitate to contact me if you have any questions/concerns.     Sincerely,     Calvin Stern MD

## 2021-01-13 NOTE — PROGRESS NOTES
Electrophysiology Clinic Note  HPI:   Mr. Carroll is a 50 year old male who has a past medical history significant for PAF (CHADSVASC 0) s/p DCCV 10/2015 and 11/20/17, and MILAGROS (uses CPAP). He presents today for follow up.     He was first diagnosed with A.fib in October 2015 when he suddenly developed palpitations, dizziness, and dyspnea on 10/10/15. He presented to the urgent care and ultimately was sent to the ER on 10/12/15 where he was diagnosed with atrial fibrillation and underwent DCCV x4. The first 3 shocks (200 J) were followed by return of AF and he was subsequently loaded with 300 mg IV Amiodarone and cardioverted again which resulted in sustained NSR. His initial Echo showed LVEF 40-45%. He was started on Pradaxa, Metoprolol XL 25 mg,and a short course of amiodarone.He was also noted to have elevated TSH He was referred to EP for further management. He saw EP in 11/2015. His amiodarone had already been discontinued on 10/28/15. He denied any recurrence of atrial fibrillation. He had a CMRI done in November 2015 showed mild non-ischemic cardiomyopathy with LVEF of 53% and no DE. A stress echo done in December 2015 showed LVEF 55-60%. Cardiac event monitoring showed sinus throughout with rare PACs and PVCs. 9 triggered events all showed NSR some was PACs.  He underwent a sleep study which demonstrated sleep apnea for which he was started on home CPAP. He then had ER visit on 5/13/16 for AF with RVR. He was given 600 mg Rythmol and offered DCCV; however, patient declined. He was started on Xarelto with plan for outpatient DCCV if AF did not self convert. He did eventually convert and did not require DCCV. He then stopped his Xarelto given no need for intervention.     He then presented to [facility] on 11/19/17 with his AF symptoms (palpitations, dizziness, and SANCHEZ). He reports onset of symptoms on 11/18/17 in the afternoon/evening. He was on a pill-in-the-pocket approach with Rythmol; however, he accidentally  thought it was amiodarone and took an amiodarone pill. He took another amiodarone in the morning on 11/19/17 and when symptoms persisted he came into [facility] for evaluation. He was found in AF and was admitted for further management. He reported having more alcohol than usual this weekend about 5 drinks in a 6 hour period. He was started on Xarelto and underwent RYDER/DCCV on 11/20/17. He followed up with EP in 12/2017 and was doing well without any further AF episodes. He stopped Xarelto 1 month after his DCCV.    EP Visit 1/2020: He presents today for follow up. He was last seen in clinic on 11/9/18. Since last visit, Lobito states he has been doing well. He has only had to use his pill-in-the-pocket propafenone once since last follow up. His symptoms abated and he did not come in for evaluation. He denies any chest pain/pressures, dizziness, lightheadedness, leg/ankle swelling, PND, orthopnea,or syncopal symptoms. He just started working out again last month. Presenting 12 lead ECG shows unusual p wave axis Vent Rate 53 bpm,  ms, QRS 90 ms,  ms. Current cardiac medications include: Toprol XL, ASA, and Propafenone pill-in-the pocket.He had a TTE 12/9/2018 that was normal.    EP Visit 1/2021: Patient presents today for follow up. He is feeling well. Over the past year he got motivated to become more healthy--he started exercising and paying attention to his diet, ended up losing 20 lbs. He has not gone into atrial fibrillation over the past year. Has been working from home, trying to avoid getting corona virus.    PAST MEDICAL HISTORY:  Past Medical History:   Diagnosis Date     Allergic state      Atrial fibrillation (H)      Sleep apnea        CURRENT MEDICATIONS:  Current Outpatient Medications   Medication Sig Dispense Refill     aspirin (ASPIRIN LOW DOSE) 81 MG tablet Take 1 tablet (81 mg) by mouth daily 30 tablet 11     HEMP OIL OR EXTRACT OR OTHER CBD CANNABINOID, NOT MEDICAL CANNABIS,        order for  "DME Equipment being ordered: CPAPPatient Dimitris Carroll was set up at [place] on December 17, 2015. Patient received a Resmed AirSense 10 Auto. Pressures were set at Auto 7 - 12 cm H2O.   Patient s ramp is 5 cm H2O for Auto and FLEX/EPR is A Flex.  Patient received a [employer] Mask name: SIMPLUS FFmask Size Large, heated tubing and heated humidifier.  Patient is enrolled in the STM Program and does need to meet compliance. Patient has a follow up on TBD with Sole Oquendo NP.   Hailey Perdomo       propafenone (RYTHMOL) 300 MG tablet Take 2 tablets (600 mg) by mouth once as needed For recurrence of atrial fibrillation. One dose per 24 hours as needed. 20 tablet 0     UNABLE TO FIND MEDICATION NAME: CBO oil at bedtime         PAST SURGICAL HISTORY:  Past Surgical History:   Procedure Laterality Date     ANESTHESIA CARDIOVERSION N/A 11/20/2017    Procedure: ANESTHESIA CARDIOVERSION;  Cardioversion ;  Surgeon: GENERIC ANESTHESIA PROVIDER;  Location:  OR       ALLERGIES:     Allergies   Allergen Reactions     Milk Protein Extract GI Disturbance     Wheat Gluten [Gluten Meal] GI Disturbance       FAMILY HISTORY:  No family history on file.    SOCIAL HISTORY:  Social History     Tobacco Use     Smoking status: Never Smoker     Smokeless tobacco: Never Used   Substance Use Topics     Alcohol use: No     Alcohol/week: 0.0 standard drinks     Drug use: No       ROS:   A comprehensive 10 point review of systems negative other than as mentioned in HPI.  Exam:  /78 (BP Location: Right arm, Patient Position: Chair, Cuff Size: Adult Large)   Pulse (!) 49   Ht 1.905 m (6' 3\")   Wt 94.3 kg (207 lb 14.4 oz)   SpO2 99%   BMI 25.99 kg/m       GENERAL APPEARANCE: alert and no distress  HEENT: no icterus, no xanthelasmas, normal pupil size and reaction, normal palate, mucosa moist, no central cyanosis  NECK: no adenopathy, no asymmetry, masses, or scars, thyroid normal to palpation and no bruits, JVP not " elevated  RESPIRATORY: lungs clear to auscultation - no rales, rhonchi or wheezes, no use of accessory muscles, no retractions, respirations are unlabored, normal respiratory rate  CARDIOVASCULAR: regular rhythm, normal S1 with physiologic split S2, no S3 or S4 and no murmur, click or rub, precordium quiet with normal PMI.  ABDOMEN: soft, non tender, bowel sounds normal, non-distended  EXTREMITIES: peripheral pulses normal, no edema  NEURO: alert and oriented to person/place/time, normal speech, gait and affect  SKIN: no ecchymoses, no rashes  PSYCH: normal affect, cooperative    Labs:  Reviewed.     Testing/Procedures:    12/2015 STRESS ECHOCARDIOGRAM:   Interpretation Summary  There is 1 mm ST segment depression in the Inferior leads.  No stress induced regional wall motion abnormalities. Normal resting LV  function with EF of approximately 60-65%; normal response to exercise with  increase to approximately 70-75%. Normal functional capacity.  ______________________________________________________________________________     Stress  Patient was given 4ml mixture of 1.5ml Definity and 8.5ml saline.  There is 1 mm ST segment depression in the Inferior leads.  Limiting Sympton: fatigue.  Peak MVO2 33 ml/kg/min .  Percent predicted MVO2 112 %.  RPP 12468.  The maximum dose of atropine was 1.2mg.  Maximum workload 225 powers.  Target Heart Rate was achieved.  Exercise was stopped due to fatigue.  The patient did not exhibit any symptoms during exercise.  Normal blood pressure response with stress.  Normal heart rate response to exercise.     Rest  The baseline ECG displays normal sinus rhythm.     Stress Results                                       Maximum Predicted HR:  173 bpm            Target HR: 147 bpm        % Maximum Predicted HR:  89 %                      +--------+--------+----------+------+----+                   :  Stage :Duration:Heart Rate:  BPDos   e:                   :        : (mm:ss):   (bpm)  :       :    :                   +--------+--------+----------+------+----+                   :BASELINE:  0:00 55          :102/71:0.00:                   +--------+--------+----------+------+----+                   :  PEAK  :  15:23 1     54   :174/91:1.20:                   +--------+--------+----------+------+----+                        Stress Duration:  15:21 mm:ss *                    Maximum Stress HR:   154 bpm *        Left Ventricle  Left ventricular systolic function is normal.  Aortic Valve  The aortic valve is normal in structure and function.     Mitral Valve  The mitral valve is normal in structure and function.     Tricuspid Valve  The tricuspid valve is normal in structure and function.     Right Ventricle  The right ventricular systolic function is normal.     Pericardium  There is no pericardial effusion.    TTE 12/3/2019:  Interpretation Summary  Global and regional left ventricular function is normal with an EF of 60-65%.  Right ventricular function, chamber size, wall motion, and thickness are normal.  Both atria appear normal.  Dilation of the inferior vena cava is present with normal respiratory  variation in diameter.  No pericardial effusion is present.  LV function has normalized and patient in sinus rhythm.    Assessment and Plan:   Mr. Carroll is a 50 year old male who has a past medical history significant for PAF (CHADSVASC 0) s/p DCCV 10/2015 and 2017 and MILAGROS (uses CPAP). He presents today for follow up--since last year he has not had any kaylyn episodes of atrial fibrillation, likely due to losing weight, exercising, and eating better.  Paroxysmal Atrial Fibrillation:   We discussed in detail with the patient management/treatment options for Kenji includin. Stroke Prophylaxis:  CHADSVASC=  0, corresponding to a 0.2-0.5% annual stroke / systemic emolism event rate. indicating no need for long term oral anticoagulation. He is on ASA only.   2. Rate Control: None.  3. Rhythm Control:  Cardioversion, Antiarrhythmics and/or ablation are options for rhythm control. He had DCCV in 10/2015, recurrence in 5/2016 and was given Rythmol and eventually converted without DCCV. Then DCCV 11/20/17. He has been instructed to use Rythmol as pill-in-the-pocket for any AF.  He takes Propafenone 600 mg at onset of AF symptoms.  He has been instructed to call if he remains in AF for 24 hours or come in for evaluation immediately if he is unwell. Will continue this.  4. Risk Factor Management: maintain tight BP control and continued MILAGROS treatment. Continue with diet/exercising that he has been doing well with.      Follow up in 1 year.     The patient states understanding and is agreeable with plan.     This patient was seen and examined with Dr. Stern. The above note reflects our joint assessment and plan.     Dajuan Pena MD PhD  Cardiology Fellow  P: Text Page     EP STAFF NOTE  Patient seen and examined and management plan personally reviewed with the patient. I agree with the note above by the CV/EP fellow.  Calvin Stern MD [facility]  Cardiology - Electrophysiology

## 2021-01-13 NOTE — PATIENT INSTRUCTIONS
You were seen in Electrophysiology today by: Dr Stern    Plan:     Follow up visit: 1 year      Your Care Team:  EP Cardiology   Telephone Number     Dora Granados RN [phone]     For scheduling appts or procedures:    Little Carranza   [phone]   For the Device Clinic (Pacemakers, ICDs, Loop Recorders)    During business hours: [phone]  After business hours:   [phone]- select option 4 and ask for job code [phone].       Cardiovascular Clinic:   [address] [place] [address]. [place] [address]      As always, Thank you for trusting us with your health care needs!

## 2021-01-15 DIAGNOSIS — I48.0 PAROXYSMAL ATRIAL FIBRILLATION (H): ICD-10-CM

## 2021-01-15 LAB — INTERPRETATION ECG - MUSE: NORMAL

## 2021-01-19 NOTE — TELEPHONE ENCOUNTER
PROPAFENONE 300MG TABLETS     Last Written Prescription Date:  1/5/2021  Last Fill Quantity: 20,   # refills: 0  Last Office Visit : 1/13/2021  Future Office visit:  None    Routing refill request to provider for review/approval because:  Drug not on the [facility] or [facility] refill protocol or controlled substance      Lyndsey Ponce RN  Central Triage Red Flags/Med Refills

## 2021-01-20 RX ORDER — PROPAFENONE HYDROCHLORIDE 300 MG/1
TABLET, COATED ORAL
Qty: 20 TABLET | OUTPATIENT
Start: 2021-01-20

## 2021-02-04 RX ORDER — METOPROLOL SUCCINATE 25 MG/1
TABLET, EXTENDED RELEASE ORAL
COMMUNITY
Start: 2021-01-02 | End: 2021-02-26

## 2021-02-08 RX ORDER — METOPROLOL SUCCINATE 25 MG/1
TABLET, EXTENDED RELEASE ORAL
OUTPATIENT
Start: 2021-02-08

## 2021-02-08 NOTE — TELEPHONE ENCOUNTER
metoprolol succinate ER (TOPROL-XL) 25 MG 24 hr tablet     Take 1 tablet (25 mg) by mouth daily    Last Written Prescription Date:  1/8/20-1/5/21  Last Fill Quantity: 90,   # refills: 3  Last Office Visit : 1/13/21  Follow up in 1 year.   Future Office visit:  none    Routing refill request to provider for review/approval because:  Discontinued Medication Reconciliation Clean Up Dora Granados, RN 1/5/21 [phone]

## 2021-02-25 RX ORDER — METOPROLOL SUCCINATE 25 MG/1
TABLET, EXTENDED RELEASE ORAL
Status: CANCELLED | OUTPATIENT
Start: 2021-02-25

## 2021-02-25 NOTE — TELEPHONE ENCOUNTER
metoprolol succinate ER (TOPROL-XL) 25 MG 24 hr tablet  Last Written Prescription Date:  1/2/2021  Last Fill Quantity: ?,   # refills: ?  Last Office Visit : 1/13/2021  Future Office visit:  None  Medication is reported/historical      Lyndsey Ponce RN  Central Triage Red Flags/Med Refills

## 2021-09-12 ENCOUNTER — HEALTH MAINTENANCE LETTER (OUTPATIENT)
Age: 51
End: 2021-09-12

## 2021-12-15 ENCOUNTER — OFFICE VISIT (OUTPATIENT)
Dept: CARDIOLOGY | Facility: CLINIC | Age: 51
End: 2021-12-15
Attending: INTERNAL MEDICINE
Payer: COMMERCIAL

## 2021-12-15 VITALS
DIASTOLIC BLOOD PRESSURE: 72 MMHG | BODY MASS INDEX: 26.74 KG/M2 | SYSTOLIC BLOOD PRESSURE: 114 MMHG | HEART RATE: 56 BPM | WEIGHT: 213.9 LBS | OXYGEN SATURATION: 96 %

## 2021-12-15 DIAGNOSIS — I48.0 PAROXYSMAL ATRIAL FIBRILLATION (H): Primary | ICD-10-CM

## 2021-12-15 PROCEDURE — G0463 HOSPITAL OUTPT CLINIC VISIT: HCPCS | Mod: 25

## 2021-12-15 PROCEDURE — 93005 ELECTROCARDIOGRAM TRACING: CPT

## 2021-12-15 PROCEDURE — 99214 OFFICE O/P EST MOD 30 MIN: CPT | Performed by: INTERNAL MEDICINE

## 2021-12-15 ASSESSMENT — PAIN SCALES - GENERAL: PAINLEVEL: NO PAIN (0)

## 2021-12-15 NOTE — PROGRESS NOTES
ELECTROPHYSIOLOGY CLINIC VISIT    Assessment/Recommendations   Assessment/Plan:    Mr. Carroll is a 51 year old male who has a past medical history significant for PAF (CHADSVASC 0) s/p DCCV 10/2015 and 17, and MILAGROS (uses CPAP). He presents today for follow up.   Paroxysmal Atrial Fibrillation:   We discussed in detail with the patient management/treatment options for A.fib includin. Stroke Prophylaxis:  CHADSVASC=  0, corresponding to a 0.2-0.5% annual stroke / systemic emolism event rate. indicating no need for long term oral anticoagulation. He is on ASA only.   2. Rate Control: None.  3. Rhythm Control: Cardioversion, Antiarrhythmics and/or ablation are options for rhythm control. He had DCCV in 10/2015, recurrence in 2016 and was given Rythmol and eventually converted without DCCV. Then DCCV 17. He has been instructed to use Rythmol as pill-in-the-pocket for any AF.  He takes Propafenone 600 mg at onset of AF symptoms.  He has been instructed to call if he remains in AF for 24 hours or come in for evaluation immediately if he is unwell. Will continue this. He has not had any AF over last year.   4. Risk Factor Management: maintain tight BP control and continued MILAGROS treatment. Continue with diet/exercising that he has been doing well with.      Follow up in 2 years or sooner if need arises.        History of Present Illness/Subjective    Mr. Dimitris Carroll is a 51 year old male who comes in today for EP follow-up of PAF.    Mr. Carroll is a 51 year old male who has a past medical history significant for PAF (CHADSVASC 0) s/p DCCV 10/2015 and 17, and MILAGROS (uses CPAP). He presents today for follow up.      He was first diagnosed with A.fib in 2015 when he suddenly developed palpitations, dizziness, and dyspnea on 10/10/15. He presented to the urgent care and ultimately was sent to the ER on 10/12/15 where he was diagnosed with atrial fibrillation and underwent DCCV x4. The  first 3 shocks (200 J) were followed by return of AF and he was subsequently loaded with 300 mg IV Amiodarone and cardioverted again which resulted in sustained NSR. His initial Echo showed LVEF 40-45%. He was started on Pradaxa, Metoprolol XL 25 mg,and a short course of amiodarone.He was also noted to have elevated TSH He was referred to EP for further management. He saw EP in 11/2015. His amiodarone had already been discontinued on 10/28/15. He denied any recurrence of atrial fibrillation. He had a CMRI done in November 2015 showed mild non-ischemic cardiomyopathy with LVEF of 53% and no DE. A stress echo done in December 2015 showed LVEF 55-60%. Cardiac event monitoring showed sinus throughout with rare PACs and PVCs. 9 triggered events all showed NSR some was PACs.  He underwent a sleep study which demonstrated sleep apnea for which he was started on home CPAP. He then had ER visit on 5/13/16 for AF with RVR. He was given 600 mg Rythmol and offered DCCV; however, patient declined. He was started on Xarelto with plan for outpatient DCCV if AF did not self convert. He did eventually convert and did not require DCCV. He then stopped his Xarelto given no need for intervention.      He then presented to [facility] on 11/19/17 with his AF symptoms (palpitations, dizziness, and SANCHEZ). He reports onset of symptoms on 11/18/17 in the afternoon/evening. He was on a pill-in-the-pocket approach with Rythmol; however, he accidentally thought it was amiodarone and took an amiodarone pill. He took another amiodarone in the morning on 11/19/17 and when symptoms persisted he came into [facility] for evaluation. He was found in AF and was admitted for further management. He reported having more alcohol than usual this weekend about 5 drinks in a 6 hour period. He was started on Xarelto and underwent RYDER/DCCV on 11/20/17. He followed up with EP in 12/2017 and was doing well without any further AF episodes. He stopped Xarelto 1 month after his  DCCV.     EP Visit 1/2020: He presents today for follow up. He was last seen in clinic on 11/9/18. Since last visit, Lobito states he has been doing well. He has only had to use his pill-in-the-pocket propafenone once since last follow up. His symptoms abated and he did not come in for evaluation. He denies any chest pain/pressures, dizziness, lightheadedness, leg/ankle swelling, PND, orthopnea,or syncopal symptoms. He just started working out again last month. Presenting 12 lead ECG shows unusual p wave axis Vent Rate 53 bpm,  ms, QRS 90 ms,  ms. Current cardiac medications include: Toprol XL, ASA, and Propafenone pill-in-the pocket.He had a TTE 12/9/2018 that was normal.     EP Visit 1/2021: Patient presents today for follow up. He is feeling well. Over the past year he got motivated to become more healthy--he started exercising and paying attention to his diet, ended up losing 20 lbs. He has not gone into atrial fibrillation over the past year. Has been working from home, trying to avoid getting corona virus.    He presents today for follow up. He reports feeling well. He denies chest discomfort, palpitations, abdominal fullness/bloating or peripheral edema, shortness of breath, paroxysmal nocturnal dyspnea, orthopnea, lightheadedness, dizziness, pre-syncope, or syncope. Presenting 12 lead ECG shows SB Vent Rate 58 bpm,  ms,  ms, QTc 416 ms. Current cardiac medications include: Propafenone and ASA.         I have reviewed and updated the patient's Past Medical History, Social History, Family History and Medication List.     Cardiographics (Personally Reviewed) :   12/3/18 Echo:   Interpretation Summary  Global and regional left ventricular function is normal with an EF of 60-65%.  Right ventricular function, chamber size, wall motion, and thickness are  normal.  Both atria appear normal.  Dilation of the inferior vena cava is present with normal respiratory  variation in diameter.  No  pericardial effusion is present.  LV function has normalized and patient in sinus rhythm.    4/18/19 NM Stress Test:   Impression:  1. Normal myocardial SPECT perfusion study without any evidence of  ischemia or infarct.  2. Normal left ventricular ejection fraction/myocardial contractility.  3. Two lung nodules in the right lung smaller than 4 mm, favored as  Benign.    11/2015 CMR:  1. The left ventricle is mildly dilated. The wall thickness is normal.  The global systolic function is mildly reduced. The quantitative LV  ejection fraction is 53%. There is mild global hypokinesis.     LV volumes absolute and indexed to BSA with age and gender-matched  normal values in parentheses are listed below:     ED volume: 223 ml (101 - 236 ml)  ED volume index: 104 ml/m2 (52 - 112 ml/m2)  ES volume: 105 ml (28 - 93 ml)  ES volume index: 49 ml/m2     2. The right ventricle is mildly dilated in cavity size with mildly  reduced global systolic function. The quantitative RV ejection  fraction is 51%.     RV volumes absolute and indexed to BSA with age and gender-matched  normal values in parentheses are listed below:     ED volume: 274 ml (110 - 243 ml)  ED volume index: 128 ml/m2 (58 - 115 ml/m2)  ES volume: 134 ml (46 - 112 ml)  ES volume index: 63 ml/m2     3. There is moderate biatrial enlargement.     4. The aortic valve is trileaflet in morphology. There is no  significant aortic valve stenosis or regurgitation.      5. There is no other significant valvular disease.      6. Delayed enhancement imaging demonstrates no evidence of myocardial  infarction, fibrosis or infiltrative disease. This is consistent with  a non-ischemic cardiomyopathy.    IMPRESSION:  Mild non-ischemic cardiomyopathy with LVEF of 53%.       Physical Examination   /72 (BP Location: Right arm, Patient Position: Supine, Cuff Size: Adult Regular)   Pulse 56   Wt 97 kg (213 lb 14.4 oz)   SpO2 96%   BMI 26.74 kg/m    Wt Readings from Last 3  Encounters:   01/13/21 94.3 kg (207 lb 14.4 oz)   12/14/20 92.5 kg (204 lb)   01/08/20 99.3 kg (219 lb)     General Appearance:   Alert, well-appearing and in no acute distress.   HEENT: Atraumatic, normocephalic. PERRL.  MMM.   Chest/Lungs:   Respirations unlabored.  Lungs are clear to auscultation.   Cardiovascular:   Regular rate and rhythm.  S1/S2. No murmur.    Abdomen:  Soft, nontender, nondistended.   Extremities: No cyanosis or clubbing. No edema.    Musculoskeletal: Moves all extremities.  Gait normal.   Skin: Warm, dry, intact.    Neurologic: Mood and affect are appropriate.  Alert and oriented to person, place, time, and situation.          Medications  Allergies   Current Outpatient Medications   Medication Sig Dispense Refill     aspirin (ASPIRIN LOW DOSE) 81 MG tablet Take 1 tablet (81 mg) by mouth daily 30 tablet 11     HEMP OIL OR EXTRACT OR OTHER CBD CANNABINOID, NOT MEDICAL CANNABIS,        order for DME Equipment being ordered: CPAPPatient Dimitris Carroll was set up at [place] on December 17, 2015. Patient received a Resmed AirSense 10 Auto. Pressures were set at Auto 7 - 12 cm H2O.   Patient s ramp is 5 cm H2O for Auto and FLEX/EPR is A Flex.  Patient received a [employer] Mask name: SIMPLUS FFmask Size Large, heated tubing and heated humidifier.  Patient is enrolled in the STM Program and does need to meet compliance. Patient has a follow up on TBD with Sole Oquendo NP.   Hailey Perdomo       propafenone (RYTHMOL) 300 MG tablet Take 2 tablets (600 mg) by mouth once as needed For recurrence of atrial fibrillation. One dose per 24 hours as needed. 20 tablet 0     UNABLE TO FIND MEDICATION NAME: CBO oil at bedtime      Allergies   Allergen Reactions     Dairy Products [Milk Protein Extract] GI Disturbance     Wheat Gluten [Gluten Meal] GI Disturbance         Lab Results (Personally Reviewed)    Chemistry/lipid CBC Cardiac Enzymes/BNP/TSH/INR   Lab Results   Component Value Date    BUN 14  04/18/2019     04/18/2019    CO2 27 04/18/2019     Creatinine   Date Value Ref Range Status   04/18/2019 1.03 0.66 - 1.25 mg/dL Final       No results found for: CHOL, HDL, LDL   Lab Results   Component Value Date    WBC 6.3 04/18/2019    HGB 15.6 04/18/2019    HCT 47.6 04/18/2019    MCV 95 04/18/2019     04/18/2019    Lab Results   Component Value Date    TSH 1.17 05/13/2016    INR 0.96 11/20/2017        The patient states understanding and is agreeable with the plan.   This patient was seen and examined with Dr. Stern. The above note reflects our joint assessment and plan.   ARIANE Whitfield CNP  Electrophysiology Consult Service  Pager: [phone]    EP STAFF NOTE  I have seen and examined the patient as part of a shared visit with ALL Whitfield NP.  I agree with the note above. I reviewed today's vital signs and medications. I have reviewed and discussed with the advanced practice provider their physical examination, assessment, and plan   Briefly, patient doing well with propafenone pill in the pocket apprach  My key history/exam findings are: RRR.   The key management decisions made by me: f/u in 2 years since he has low burden, or earlier if needed.    Calvin Stern MD [facility]  Cardiology - Electrophysiology

## 2021-12-15 NOTE — PATIENT INSTRUCTIONS
Plan:     Follow-up in 2 years      Your Care Team:  EP Cardiology   Telephone Number     Dora Granados RN [phone]     For scheduling appts or procedures:    Little Carranza   [phone]   For the Device Clinic (Pacemakers, ICDs, Loop Recorders)    During business hours: [phone]  After business hours:   [phone]- select option 4 and ask for job code [id number].       Cardiovascular Clinic:   [address] [place] [address]. [place] [address]      As always, Thank you for trusting us with your health care needs!

## 2021-12-15 NOTE — NURSING NOTE
Chief Complaint   Patient presents with     Follow Up     return arrythmia- annual follow up     Vitals were taken, medications reconciled, and EKG was performed.    ZIYAD Jin  3:16 PM

## 2021-12-15 NOTE — LETTER
12/15/2021      RE: Dimitris Carroll  [address]  [facility] [address]       Dear Colleague,    Thank you for the opportunity to participate in the care of your patient, Dimitris Carroll, at the [facility] [place] at [facility]. Please see a copy of my visit note below.        ELECTROPHYSIOLOGY CLINIC VISIT    Assessment/Recommendations   Assessment/Plan:    Mr. Carroll is a 51 year old male who has a past medical history significant for PAF (CHADSVASC 0) s/p DCCV 10/2015 and 17, and MILAGROS (uses CPAP). He presents today for follow up.   Paroxysmal Atrial Fibrillation:   We discussed in detail with the patient management/treatment options for Kenji includin. Stroke Prophylaxis:  CHADSVASC=  0, corresponding to a 0.2-0.5% annual stroke / systemic emolism event rate. indicating no need for long term oral anticoagulation. He is on ASA only.   2. Rate Control: None.  3. Rhythm Control: Cardioversion, Antiarrhythmics and/or ablation are options for rhythm control. He had DCCV in 10/2015, recurrence in 2016 and was given Rythmol and eventually converted without DCCV. Then DCCV 17. He has been instructed to use Rythmol as pill-in-the-pocket for any AF.  He takes Propafenone 600 mg at onset of AF symptoms.  He has been instructed to call if he remains in AF for 24 hours or come in for evaluation immediately if he is unwell. Will continue this. He has not had any AF over last year.   4. Risk Factor Management: maintain tight BP control and continued MILAGROS treatment. Continue with diet/exercising that he has been doing well with.      Follow up in 2 years or sooner if need arises.        History of Present Illness/Subjective    Mr. Dimitris Carroll is a 51 year old male who comes in today for EP follow-up of PAF.    Mr. Carroll is a 51 year old male who has a past medical history significant for PAF (CHADSVASC 0) s/p DCCV 10/2015 and  11/20/17, and MILAGROS (uses CPAP). He presents today for follow up.      He was first diagnosed with A.fib in October 2015 when he suddenly developed palpitations, dizziness, and dyspnea on 10/10/15. He presented to the urgent care and ultimately was sent to the ER on 10/12/15 where he was diagnosed with atrial fibrillation and underwent DCCV x4. The first 3 shocks (200 J) were followed by return of AF and he was subsequently loaded with 300 mg IV Amiodarone and cardioverted again which resulted in sustained NSR. His initial Echo showed LVEF 40-45%. He was started on Pradaxa, Metoprolol XL 25 mg,and a short course of amiodarone.He was also noted to have elevated TSH He was referred to EP for further management. He saw EP in 11/2015. His amiodarone had already been discontinued on 10/28/15. He denied any recurrence of atrial fibrillation. He had a CMRI done in November 2015 showed mild non-ischemic cardiomyopathy with LVEF of 53% and no DE. A stress echo done in December 2015 showed LVEF 55-60%. Cardiac event monitoring showed sinus throughout with rare PACs and PVCs. 9 triggered events all showed NSR some was PACs.  He underwent a sleep study which demonstrated sleep apnea for which he was started on home CPAP. He then had ER visit on 5/13/16 for AF with RVR. He was given 600 mg Rythmol and offered DCCV; however, patient declined. He was started on Xarelto with plan for outpatient DCCV if AF did not self convert. He did eventually convert and did not require DCCV. He then stopped his Xarelto given no need for intervention.      He then presented to [facility] on 11/19/17 with his AF symptoms (palpitations, dizziness, and SANCHEZ). He reports onset of symptoms on 11/18/17 in the afternoon/evening. He was on a pill-in-the-pocket approach with Rythmol; however, he accidentally thought it was amiodarone and took an amiodarone pill. He took another amiodarone in the morning on 11/19/17 and when symptoms persisted he came into [facility] for  evaluation. He was found in AF and was admitted for further management. He reported having more alcohol than usual this weekend about 5 drinks in a 6 hour period. He was started on Xarelto and underwent RYDER/DCCV on 11/20/17. He followed up with EP in 12/2017 and was doing well without any further AF episodes. He stopped Xarelto 1 month after his DCCV.     EP Visit 1/2020: He presents today for follow up. He was last seen in clinic on 11/9/18. Since last visit, Lobito states he has been doing well. He has only had to use his pill-in-the-pocket propafenone once since last follow up. His symptoms abated and he did not come in for evaluation. He denies any chest pain/pressures, dizziness, lightheadedness, leg/ankle swelling, PND, orthopnea,or syncopal symptoms. He just started working out again last month. Presenting 12 lead ECG shows unusual p wave axis Vent Rate 53 bpm,  ms, QRS 90 ms,  ms. Current cardiac medications include: Toprol XL, ASA, and Propafenone pill-in-the pocket.He had a TTE 12/9/2018 that was normal.     EP Visit 1/2021: Patient presents today for follow up. He is feeling well. Over the past year he got motivated to become more healthy--he started exercising and paying attention to his diet, ended up losing 20 lbs. He has not gone into atrial fibrillation over the past year. Has been working from home, trying to avoid getting corona virus.    He presents today for follow up. He reports feeling well. He denies chest discomfort, palpitations, abdominal fullness/bloating or peripheral edema, shortness of breath, paroxysmal nocturnal dyspnea, orthopnea, lightheadedness, dizziness, pre-syncope, or syncope. Presenting 12 lead ECG shows SB Vent Rate 58 bpm,  ms,  ms, QTc 416 ms. Current cardiac medications include: Propafenone and ASA.         I have reviewed and updated the patient's Past Medical History, Social History, Family History and Medication List.     Cardiographics  (Personally Reviewed) :   12/3/18 Echo:   Interpretation Summary  Global and regional left ventricular function is normal with an EF of 60-65%.  Right ventricular function, chamber size, wall motion, and thickness are  normal.  Both atria appear normal.  Dilation of the inferior vena cava is present with normal respiratory  variation in diameter.  No pericardial effusion is present.  LV function has normalized and patient in sinus rhythm.    4/18/19 NM Stress Test:   Impression:  1. Normal myocardial SPECT perfusion study without any evidence of  ischemia or infarct.  2. Normal left ventricular ejection fraction/myocardial contractility.  3. Two lung nodules in the right lung smaller than 4 mm, favored as  Benign.    11/2015 CMR:  1. The left ventricle is mildly dilated. The wall thickness is normal.  The global systolic function is mildly reduced. The quantitative LV  ejection fraction is 53%. There is mild global hypokinesis.     LV volumes absolute and indexed to BSA with age and gender-matched  normal values in parentheses are listed below:     ED volume: 223 ml (101 - 236 ml)  ED volume index: 104 ml/m2 (52 - 112 ml/m2)  ES volume: 105 ml (28 - 93 ml)  ES volume index: 49 ml/m2     2. The right ventricle is mildly dilated in cavity size with mildly  reduced global systolic function. The quantitative RV ejection  fraction is 51%.     RV volumes absolute and indexed to BSA with age and gender-matched  normal values in parentheses are listed below:     ED volume: 274 ml (110 - 243 ml)  ED volume index: 128 ml/m2 (58 - 115 ml/m2)  ES volume: 134 ml (46 - 112 ml)  ES volume index: 63 ml/m2     3. There is moderate biatrial enlargement.     4. The aortic valve is trileaflet in morphology. There is no  significant aortic valve stenosis or regurgitation.      5. There is no other significant valvular disease.      6. Delayed enhancement imaging demonstrates no evidence of myocardial  infarction, fibrosis or  infiltrative disease. This is consistent with  a non-ischemic cardiomyopathy.    IMPRESSION:  Mild non-ischemic cardiomyopathy with LVEF of 53%.       Physical Examination   /72 (BP Location: Right arm, Patient Position: Supine, Cuff Size: Adult Regular)   Pulse 56   Wt 97 kg (213 lb 14.4 oz)   SpO2 96%   BMI 26.74 kg/m    Wt Readings from Last 3 Encounters:   01/13/21 94.3 kg (207 lb 14.4 oz)   12/14/20 92.5 kg (204 lb)   01/08/20 99.3 kg (219 lb)     General Appearance:   Alert, well-appearing and in no acute distress.   HEENT: Atraumatic, normocephalic. PERRL.  MMM.   Chest/Lungs:   Respirations unlabored.  Lungs are clear to auscultation.   Cardiovascular:   Regular rate and rhythm.  S1/S2. No murmur.    Abdomen:  Soft, nontender, nondistended.   Extremities: No cyanosis or clubbing. No edema.    Musculoskeletal: Moves all extremities.  Gait normal.   Skin: Warm, dry, intact.    Neurologic: Mood and affect are appropriate.  Alert and oriented to person, place, time, and situation.          Medications  Allergies   Current Outpatient Medications   Medication Sig Dispense Refill     aspirin (ASPIRIN LOW DOSE) 81 MG tablet Take 1 tablet (81 mg) by mouth daily 30 tablet 11     HEMP OIL OR EXTRACT OR OTHER CBD CANNABINOID, NOT MEDICAL CANNABIS,        order for DME Equipment being ordered: CPAPPatient Dimitris Carroll was set up at [facility] on December 17, 2015. Patient received a Resmed AirSense 10 Auto. Pressures were set at Auto 7 - 12 cm H2O.   Patient s ramp is 5 cm H2O for Auto and FLEX/EPR is A Flex.  Patient received a [employer] Mask name: SIMPLUS FFmask Size Large, heated tubing and heated humidifier.  Patient is enrolled in the STM Program and does need to meet compliance. Patient has a follow up on TBD with Sole Oquendo NP.   Hailey Perdomo       propafenone (RYTHMOL) 300 MG tablet Take 2 tablets (600 mg) by mouth once as needed For recurrence of atrial fibrillation. One dose per 24  hours as needed. 20 tablet 0     UNABLE TO FIND MEDICATION NAME: CBO oil at bedtime      Allergies   Allergen Reactions     Dairy Products [Milk Protein Extract] GI Disturbance     Wheat Gluten [Gluten Meal] GI Disturbance         Lab Results (Personally Reviewed)    Chemistry/lipid CBC Cardiac Enzymes/BNP/TSH/INR   Lab Results   Component Value Date    BUN 14 04/18/2019     04/18/2019    CO2 27 04/18/2019     Creatinine   Date Value Ref Range Status   04/18/2019 1.03 0.66 - 1.25 mg/dL Final       No results found for: CHOL, HDL, LDL   Lab Results   Component Value Date    WBC 6.3 04/18/2019    HGB 15.6 04/18/2019    HCT 47.6 04/18/2019    MCV 95 04/18/2019     04/18/2019    Lab Results   Component Value Date    TSH 1.17 05/13/2016    INR 0.96 11/20/2017        The patient states understanding and is agreeable with the plan.   This patient was seen and examined with Dr. Stern. The above note reflects our joint assessment and plan.   ARIANE Whitfield CNP  Electrophysiology Consult Service  Pager: [phone]    EP STAFF NOTE  I have seen and examined the patient as part of a shared visit with ALL Whitfield NP.  I agree with the note above. I reviewed today's vital signs and medications. I have reviewed and discussed with the advanced practice provider their physical examination, assessment, and plan   Briefly, patient doing well with propafenone pill in the pocket apprach  My key history/exam findings are: RRR.   The key management decisions made by me: f/u in 2 years since he has low burden, or earlier if needed.    Calvin Stren MD [facility]  Cardiology - Electrophysiology

## 2021-12-20 LAB
ATRIAL RATE - MUSE: 58 BPM
DIASTOLIC BLOOD PRESSURE - MUSE: NORMAL MMHG
INTERPRETATION ECG - MUSE: NORMAL
P AXIS - MUSE: 62 DEGREES
PR INTERVAL - MUSE: 170 MS
QRS DURATION - MUSE: 100 MS
QT - MUSE: 424 MS
QTC - MUSE: 416 MS
R AXIS - MUSE: 49 DEGREES
SYSTOLIC BLOOD PRESSURE - MUSE: NORMAL MMHG
T AXIS - MUSE: 33 DEGREES
VENTRICULAR RATE- MUSE: 58 BPM

## 2022-01-02 ENCOUNTER — HEALTH MAINTENANCE LETTER (OUTPATIENT)
Age: 52
End: 2022-01-02

## 2022-05-03 ENCOUNTER — TELEPHONE (OUTPATIENT)
Dept: SLEEP MEDICINE | Facility: CLINIC | Age: 52
End: 2022-05-03
Payer: COMMERCIAL

## 2022-05-03 NOTE — TELEPHONE ENCOUNTER
Patient had questions about CPAP machine not using as much water has has in past, however was better today. Discussed the amount of leak that is noted on downloads. He states he has not changed the cushions as often as he probably should. Did inform him that he does need to see his sleep provider to write a new prescription and we then can ship out supplies. He states understanding.

## 2022-05-11 ENCOUNTER — DOCUMENTATION ONLY (OUTPATIENT)
Dept: SLEEP MEDICINE | Facility: CLINIC | Age: 52
End: 2022-05-11
Payer: COMMERCIAL

## 2022-07-06 ENCOUNTER — OFFICE VISIT (OUTPATIENT)
Dept: INTERNAL MEDICINE | Facility: CLINIC | Age: 52
End: 2022-07-06
Payer: COMMERCIAL

## 2022-07-06 VITALS
BODY MASS INDEX: 26.58 KG/M2 | HEIGHT: 75 IN | RESPIRATION RATE: 12 BRPM | OXYGEN SATURATION: 96 % | DIASTOLIC BLOOD PRESSURE: 85 MMHG | HEART RATE: 64 BPM | TEMPERATURE: 98.2 F | SYSTOLIC BLOOD PRESSURE: 124 MMHG | WEIGHT: 213.8 LBS

## 2022-07-06 DIAGNOSIS — Z00.00 ROUTINE HEALTH MAINTENANCE: ICD-10-CM

## 2022-07-06 DIAGNOSIS — H57.89 EYE IRRITATION: ICD-10-CM

## 2022-07-06 DIAGNOSIS — Z80.0 FAMILY HISTORY OF COLON CANCER: Primary | ICD-10-CM

## 2022-07-06 DIAGNOSIS — Z12.5 SCREENING FOR PROSTATE CANCER: ICD-10-CM

## 2022-07-06 PROCEDURE — 90715 TDAP VACCINE 7 YRS/> IM: CPT | Performed by: INTERNAL MEDICINE

## 2022-07-06 PROCEDURE — 90471 IMMUNIZATION ADMIN: CPT | Performed by: INTERNAL MEDICINE

## 2022-07-06 PROCEDURE — 99396 PREV VISIT EST AGE 40-64: CPT | Mod: 25 | Performed by: INTERNAL MEDICINE

## 2022-07-06 RX ORDER — OLOPATADINE HYDROCHLORIDE 1 MG/ML
1 SOLUTION/ DROPS OPHTHALMIC 2 TIMES DAILY
Qty: 5 ML | Refills: 1 | Status: SHIPPED | OUTPATIENT
Start: 2022-07-06 | End: 2022-08-04

## 2022-07-06 ASSESSMENT — ENCOUNTER SYMPTOMS
EYE IRRITATION: 1
EYE PAIN: 1
DOUBLE VISION: 0
EYE WATERING: 1
EYE REDNESS: 0

## 2022-07-06 ASSESSMENT — PAIN SCALES - GENERAL: PAINLEVEL: NO PAIN (0)

## 2022-07-06 NOTE — NURSING NOTE
Chief Complaint   Patient presents with     Establish Care     Patient comes in to establish care.         Justice Mack MA on 7/6/2022 at 6:36 PM

## 2022-07-06 NOTE — PROGRESS NOTES
"HPI  52-year-old presents today to establish care.  He is generally been in good health but his brother was recently diagnosed with colon cancer.  He has not had a recent colonoscopy and we discussed the importance of this.  He has had some recent eye irritation and watering in the eyes no photophobia some itching no rhinorrhea sneezing or allergic symptoms.  Otherwise he is been exercising regularly doing some increased intensity exercise recently.  He drinks a occasional alcohol non-smoker eats a healthy diet has sleep apnea with some fragmentation of his sleep.  Some daytime sleepiness this has been getting better.  Otherwise he is had paroxysmal atrial fibrillation presently off medication for this and no recent episodes.  Past Medical History:   Diagnosis Date     Allergic state      Atrial fibrillation (H)      Sleep apnea      Past Surgical History:   Procedure Laterality Date     ANESTHESIA CARDIOVERSION N/A 11/20/2017    Procedure: ANESTHESIA CARDIOVERSION;  Cardioversion ;  Surgeon: GENERIC ANESTHESIA PROVIDER;  Location:  OR     No family history on file.    Answers for HPI/ROS submitted by the patient on 7/6/2022  General Symptoms: No  Skin Symptoms: No  HENT Symptoms: No  EYE SYMPTOMS: Yes  HEART SYMPTOMS: No  LUNG SYMPTOMS: No  INTESTINAL SYMPTOMS: No  URINARY SYMPTOMS: No  REPRODUCTIVE SYMPTOMS: No  SKELETAL SYMPTOMS: No  BLOOD SYMPTOMS: No  NERVOUS SYSTEM SYMPTOMS: No  MENTAL HEALTH SYMPTOMS: No  Eye pain: Yes  Vision loss: No  Dry eyes: No  Watery eyes: Yes  Eye bulging: No  Double vision: No  Flashing of lights: No  Floaters: No  Redness: No  Crossed eyes: No  Tunnel Vision: No  Yellowing of eyes: No  Eye irritation: Yes      Exam:  /85   Pulse 64   Temp 98.2  F (36.8  C) (Oral)   Resp 12   Ht 1.905 m (6' 3\")   Wt 97 kg (213 lb 12.8 oz)   SpO2 96%   BMI 26.72 kg/m    213 lbs 12.8 oz  Physical Exam   The patient is alert, oriented with a clear sensorium.   Skin shows no lesions or " rashes and good turgor.   Head is normocephalic and atraumatic.   Eyes show PERRLA with benign optic fundi.  Mild bilateral conjunctival injection  Ears show normal TMs bilaterally.   Mouth shows clear oral mucosa.   Neck shows no nodes, thyromegaly or bruits.   Back is non tender.  Lungs are clear to percussion and auscultation.   Heart shows normal S1 and S2 without murmur or gallop.   Abdomen is soft and nontender without masses or organomegaly.   Genitalia show normal testes. No evidence of inguinal hernia.  Rectal shows small smooth prostate without nodules or masses.  Extremities show no edema and no evidence of active synovitis.  Excellent pedal pulses bilaterally for both the dorsalis pedis and posterior tibial pulses.  Neurologic examination shows cranial nerves II-XII intact. Motor shows 5/5 strength. Reflexes are symmetric. Cerebellar testing shows normal tandem gait.  Romberg negative.      ASSESSMENT  MILAGROS on CPAP  Paroxysmal Atrial fib  Eye irritation possible allergic conjunctivitis  IGT with metabolic syndrome  Family history colon cancer brother    Plan  Update his immunizations with a Tdap and set him up for colonoscopy.  Follow-up with fasting labs.  We will try some Patanol eyedrops and refer him to ophthalmology regarding the conjunctival injection.  This note was completed using Dragon voice recognition software.      Doug Figueredo MD  General Internal Medicine  [facility]  [phone]

## 2022-07-12 ENCOUNTER — TELEPHONE (OUTPATIENT)
Dept: GASTROENTEROLOGY | Facility: CLINIC | Age: 52
End: 2022-07-12

## 2022-07-12 ENCOUNTER — LAB (OUTPATIENT)
Dept: LAB | Facility: CLINIC | Age: 52
End: 2022-07-12
Payer: COMMERCIAL

## 2022-07-12 DIAGNOSIS — Z00.00 ROUTINE HEALTH MAINTENANCE: ICD-10-CM

## 2022-07-12 DIAGNOSIS — H57.89 EYE IRRITATION: ICD-10-CM

## 2022-07-12 DIAGNOSIS — Z80.0 FAMILY HISTORY OF COLON CANCER: ICD-10-CM

## 2022-07-12 DIAGNOSIS — Z12.5 SCREENING FOR PROSTATE CANCER: ICD-10-CM

## 2022-07-12 LAB
ALBUMIN SERPL-MCNC: 3.6 G/DL (ref 3.4–5)
ALP SERPL-CCNC: 94 U/L (ref 40–150)
ALT SERPL W P-5'-P-CCNC: 28 U/L (ref 0–70)
ANION GAP SERPL CALCULATED.3IONS-SCNC: 7 MMOL/L (ref 3–14)
AST SERPL W P-5'-P-CCNC: 14 U/L (ref 0–45)
BASOPHILS # BLD AUTO: 0 10E3/UL (ref 0–0.2)
BASOPHILS NFR BLD AUTO: 0 %
BILIRUB SERPL-MCNC: 0.3 MG/DL (ref 0.2–1.3)
BUN SERPL-MCNC: 16 MG/DL (ref 7–30)
CALCIUM SERPL-MCNC: 8.5 MG/DL (ref 8.5–10.1)
CHLORIDE BLD-SCNC: 108 MMOL/L (ref 94–109)
CHOLEST SERPL-MCNC: 143 MG/DL
CO2 SERPL-SCNC: 26 MMOL/L (ref 20–32)
CREAT SERPL-MCNC: 0.96 MG/DL (ref 0.66–1.25)
EOSINOPHIL # BLD AUTO: 0.2 10E3/UL (ref 0–0.7)
EOSINOPHIL NFR BLD AUTO: 3 %
ERYTHROCYTE [DISTWIDTH] IN BLOOD BY AUTOMATED COUNT: 12.6 % (ref 10–15)
FASTING STATUS PATIENT QL REPORTED: YES
GFR SERPL CREATININE-BSD FRML MDRD: >90 ML/MIN/1.73M2
GLUCOSE BLD-MCNC: 118 MG/DL (ref 70–99)
HCT VFR BLD AUTO: 45.7 % (ref 40–53)
HDLC SERPL-MCNC: 34 MG/DL
HGB BLD-MCNC: 15.1 G/DL (ref 13.3–17.7)
IMM GRANULOCYTES # BLD: 0 10E3/UL
IMM GRANULOCYTES NFR BLD: 1 %
LDLC SERPL CALC-MCNC: 74 MG/DL
LYMPHOCYTES # BLD AUTO: 1.9 10E3/UL (ref 0.8–5.3)
LYMPHOCYTES NFR BLD AUTO: 30 %
MCH RBC QN AUTO: 30.4 PG (ref 26.5–33)
MCHC RBC AUTO-ENTMCNC: 33 G/DL (ref 31.5–36.5)
MCV RBC AUTO: 92 FL (ref 78–100)
MONOCYTES # BLD AUTO: 0.6 10E3/UL (ref 0–1.3)
MONOCYTES NFR BLD AUTO: 10 %
NEUTROPHILS # BLD AUTO: 3.6 10E3/UL (ref 1.6–8.3)
NEUTROPHILS NFR BLD AUTO: 56 %
NONHDLC SERPL-MCNC: 109 MG/DL
NRBC # BLD AUTO: 0 10E3/UL
NRBC BLD AUTO-RTO: 0 /100
PLATELET # BLD AUTO: 288 10E3/UL (ref 150–450)
POTASSIUM BLD-SCNC: 4.3 MMOL/L (ref 3.4–5.3)
PROT SERPL-MCNC: 7.2 G/DL (ref 6.8–8.8)
PSA SERPL-MCNC: 0.26 UG/L (ref 0–4)
RBC # BLD AUTO: 4.97 10E6/UL (ref 4.4–5.9)
SODIUM SERPL-SCNC: 141 MMOL/L (ref 133–144)
TRIGL SERPL-MCNC: 175 MG/DL
WBC # BLD AUTO: 6.4 10E3/UL (ref 4–11)

## 2022-07-12 PROCEDURE — 85025 COMPLETE CBC W/AUTO DIFF WBC: CPT | Performed by: PATHOLOGY

## 2022-07-12 PROCEDURE — G0103 PSA SCREENING: HCPCS | Performed by: PATHOLOGY

## 2022-07-12 PROCEDURE — 87389 HIV-1 AG W/HIV-1&-2 AB AG IA: CPT | Performed by: PATHOLOGY

## 2022-07-12 PROCEDURE — 36415 COLL VENOUS BLD VENIPUNCTURE: CPT | Performed by: PATHOLOGY

## 2022-07-12 PROCEDURE — 80061 LIPID PANEL: CPT | Performed by: PATHOLOGY

## 2022-07-12 PROCEDURE — 86803 HEPATITIS C AB TEST: CPT | Performed by: PATHOLOGY

## 2022-07-12 PROCEDURE — 99000 SPECIMEN HANDLING OFFICE-LAB: CPT | Performed by: PATHOLOGY

## 2022-07-12 PROCEDURE — 80053 COMPREHEN METABOLIC PANEL: CPT | Performed by: PATHOLOGY

## 2022-07-12 NOTE — TELEPHONE ENCOUNTER
Screening Questions    BlueKIND OF PREP RedLOCATION [review exclusion criteria] GreenSEDATION TYPE      1. Are you active on mychart? Y    2. What insurance is in the chart? BCBS OF [place]      3.   Ordering/Referring Provider: NINFA     4. BMI   (If greater than 40 review exclusion criteria [PAC APPT IF [MAC] @ [facility])  26.6  [If yes, BMI OVER 40-EXTENDED PREP]      **(Sedation review/consideration needed)**  Do you have a legal guardian or Medical Power of    and/or are you able to give consent for your medical care?     Y    5. Have you had a positive covid test in the last 90 days?   MAY DOES NOT KNOW WHAT DAY     6.  Are you currently on dialysis?   N [ If yes, G-PREP & HOSPITAL setting ONLY]     7.  Do you have chronic kidney disease?  n [ If yes, G-PREP ]    8.   Do you have a diagnosis of diabetes?   n   [ If yes, G-PREP ]    9.  On a regular basis do you go 3-5 days between bowel movements?   N   [ If yes, EXTENDED PREP]    10.  Are you taking any prescription pain medications on a routine schedule?    N - N [ If yes, EXTENDED PREP] [If yes, MAC]      11.   Do you have any chemical dependencies such as alcohol, street drugs, or methadone?    N [If yes, MAC]    12.   Do you have any history of post-traumatic stress syndrome, severe anxiety or history of psychosis?    N  [If yes, MAC]    13.  [FEMALES] Are you currently pregnant? NA    If yes, how many weeks?       Respiratory/Heart Screening:  [If yes to any of the following HOSPITAL setting only]     14. Do you have Pulmonary Hypertension [Lungs]?   N       15. Do you have UNCONTROLLED asthma?   N     16.  Do you use daily home oxygen?  N      17. Do you have mod to severe Obstructive Sleep Apnea?         (OKAY @ [facility]  [facility]  SH  PH  RI  MG - if pt is not on OXYGEN)  Y     18.   Have you had a heart or lung transplant?   N      19.   Have you had a stroke or Transient ischemic attack (TIA - aka  mini stroke ) within 6 months?  (If yes, please review  exclusion criteria)  N     20.   In the past 6 months, have you had any heart related issues including cardiomyopathy or heart attack?   N           If yes, did it require cardiac stenting or other implantable device?   N      21.   Do you have any implantable devices in your body (pacemaker, defib, LVAD)? (If yes, please review exclusion criteria)  N     22. Do you take nitroglycerin?   N           If yes, how often? N  (if yes, HOSPITAL setting ONLY)    23.  Are you currently taking any blood thinners?    [If yes, INFORM patient to follw up w/ ORDERING PROVIDER FOR BRIDGING INSTRUCTIONS]     N    24.   Do you transfer independently?                (If NO, please HOSPITAL setting ONLY)  Y    25.   Preferred LOCAL Pharmacy for Pre Prescription:         [id number] [place] [place] - [address] [facility] [address] [id number] - [place], [place] - [address] AT [facility] [address]    Scheduling Details  (Please ask for phone number if not scheduled by patient)      Caller : JANIE   Date of Procedure: 8/10  Surgeon: ERNESTINE   Location:         Sedation Type: CS l   Conscious Sedation- Needs  for 6 hours after the procedure  MAC/General-Needs  for 24 hours after procedure    N :[Pre-op Required] at [facility]  MG and OR for MAC sedation   (advise patient they will need a pre-op WITH IN 30 DAYS of procedure date)     Type of Procedure Scheduled:   Lower Endoscopy [Colonoscopy]    Which Colonoscopy Prep was Sent?:   SPLIT M  -     KHORUTS CF PATIENTS & GROEN'S PATIENTS NEEDS EXTENDED PREP       Informed patient they will need an adult  Y  Cannot take any type of public or medical transportation alone    Pre-Procedure Covid test to be completed at [facility] or Externally: Y  **INFORMED OF HOME TESTING & LAB OPTION**        Confirmed Nurse will call to complete assessment Y    Additional  comments: N

## 2022-07-13 LAB
HCV AB SERPL QL IA: NONREACTIVE
HIV 1+2 AB+HIV1 P24 AG SERPL QL IA: NONREACTIVE

## 2022-08-02 PROBLEM — I48.91 ATRIAL FIBRILLATION (H): Chronic | Status: ACTIVE | Noted: 2017-11-20

## 2022-08-02 PROBLEM — R07.9 CHEST PAIN: Status: RESOLVED | Noted: 2019-04-18 | Resolved: 2022-08-02

## 2022-08-04 ENCOUNTER — OFFICE VISIT (OUTPATIENT)
Dept: SLEEP MEDICINE | Facility: CLINIC | Age: 52
End: 2022-08-04
Payer: COMMERCIAL

## 2022-08-04 VITALS
SYSTOLIC BLOOD PRESSURE: 124 MMHG | OXYGEN SATURATION: 95 % | WEIGHT: 214 LBS | HEIGHT: 75 IN | HEART RATE: 57 BPM | DIASTOLIC BLOOD PRESSURE: 77 MMHG | BODY MASS INDEX: 26.61 KG/M2

## 2022-08-04 DIAGNOSIS — G47.33 OSA (OBSTRUCTIVE SLEEP APNEA): Primary | ICD-10-CM

## 2022-08-04 DIAGNOSIS — F51.04 CHRONIC INSOMNIA: ICD-10-CM

## 2022-08-04 PROCEDURE — 99214 OFFICE O/P EST MOD 30 MIN: CPT | Performed by: INTERNAL MEDICINE

## 2022-08-04 ASSESSMENT — SLEEP AND FATIGUE QUESTIONNAIRES
HOW LIKELY ARE YOU TO NOD OFF OR FALL ASLEEP IN A CAR, WHILE STOPPED FOR A FEW MINUTES IN TRAFFIC: SLIGHT CHANCE OF DOZING
HOW LIKELY ARE YOU TO NOD OFF OR FALL ASLEEP WHILE WATCHING TV: HIGH CHANCE OF DOZING
HOW LIKELY ARE YOU TO NOD OFF OR FALL ASLEEP WHILE SITTING QUIETLY AFTER LUNCH WITHOUT ALCOHOL: HIGH CHANCE OF DOZING
HOW LIKELY ARE YOU TO NOD OFF OR FALL ASLEEP WHEN YOU ARE A PASSENGER IN A CAR FOR AN HOUR WITHOUT A BREAK: MODERATE CHANCE OF DOZING
HOW LIKELY ARE YOU TO NOD OFF OR FALL ASLEEP WHILE SITTING AND TALKING TO SOMEONE: MODERATE CHANCE OF DOZING
HOW LIKELY ARE YOU TO NOD OFF OR FALL ASLEEP WHILE SITTING INACTIVE IN A PUBLIC PLACE: MODERATE CHANCE OF DOZING
HOW LIKELY ARE YOU TO NOD OFF OR FALL ASLEEP WHILE SITTING AND READING: HIGH CHANCE OF DOZING
HOW LIKELY ARE YOU TO NOD OFF OR FALL ASLEEP WHILE LYING DOWN TO REST IN THE AFTERNOON WHEN CIRCUMSTANCES PERMIT: HIGH CHANCE OF DOZING

## 2022-08-04 NOTE — NURSING NOTE
"Chief Complaint   Patient presents with     CPAP Follow Up       Initial /77   Pulse 57   Ht 1.905 m (6' 3\")   Wt 97.1 kg (214 lb)   SpO2 95%   BMI 26.75 kg/m   Estimated body mass index is 26.75 kg/m  as calculated from the following:    Height as of this encounter: 1.905 m (6' 3\").    Weight as of this encounter: 97.1 kg (214 lb).    Medication Reconciliation: complete    DME: Dhruv Trivedi CMA on 8/4/2022 at 2:13 PM     "

## 2022-08-04 NOTE — PROGRESS NOTES
Sleep Apnea - Follow-up Visit:    Impression/Plan:     Moderate Sleep apnea.   Tolerating PAP fairly well.   Sub-optimal adherence due to insomnia  High leaks noted on download, likely old supplies  - Continue current treatments.   - See DME about leak, mask options    Sleep maintenance difficulties (BRITTNEY 14)  Suspect psychophysiologic insomnia. We discussed stimulus control, sleep restriction and regular wake times. An elemant of advanced sleep phase syndrome may be suspected   - Read the book Say Good Night To Insomnia    - Referral for cognitive behavioral training     Dimitris Carroll will follow up in about 2 year(s).     I spent 25 minutes with patient including counseling, and 10 minutes with chart review, and documentation         History of Present Illness:  Chief Complaint   Patient presents with     CPAP Follow Up       Dimitris Carroll presents for follow-up of their moderate sleep apnea, managed with CPAP.     DME- Cpap supplies [facility]    Patient was initially seenby Dr. Goldberg in 2015 witjh loud snoring, snorting, nocturia x 3, and excessive daytime sleepiness (ESS 23).  - Comorbid sleep maintenance difficulties, likely psychophysiologic insomnia related to anxiety    Study Date: 12/16/2015 (206.0 lbs)  - Apnea/hypopnea index was 25.9 events per hour. The REM AHI was n/a. The supine AHI was 25.9 events per hour. RDI was 25.9 events per hour   - Lowest oxygen saturation was 89.0%. Time spent below 89% was 0 minutes.    -The final pressure was 9 cmH2O with an AHI of 5.9 events per hour. Time in REM supine on final pressure was 0 minutes.   - During diagnostic portion of the study, PLM index 16.5. PLM Arousal Index 0. During treatment portion of study, PLM index 7.3. PLM Arousal Index 0.    . He was started on CPAP 7-12     Last supplies refilled long time ago   Does not use CPAP all night because he has trouble falling back to sleep with CPAP on  He also has trouble falling back to sleep without  CPAP as well   He has an over-active mind    He will read    Do you use a CPAP Machine at home: Yes  Overall, on a scale of 0-10 how would you rate your CPAP (0 poor, 10 great): 5   Cumbersome, hassle     What type of mask do you use: Full Face Mask  Is your mask comfortable: No  If not, why: It s big, connected to a tube and awkward.    Is your mask leaking: Yes  If yes, where do you feel it: ArouNd the seal  How many night per week does the mask leak (0-7): 7    Do you notice snoring with mask on: No  Do you notice gasping arousals with mask on: No  Are you having significant oral or nasal dryness: No  Is the pressure setting comfortable: Yes    What is your typical bedtime: 10 to 11pm  How long does it take you to go to sleep on PAP therapy: Minutes  What time do you typically get out of bed for the day: 4am then try to go back to sleep. Up normally 5 to 5:30  How many hours on average per night are you using PAP therapy: 4  How many hours are you sleeping per night: 5 to 7  Do you feel well rested in the morning:        ResMed   Auto-PAP 12 cmH2O 30 day usage data:    80% of days with > 4 hours of use. 0/30 days with no use.   Average use 4' 18 minutes per day.   95%ile Leak 90 L/min. Median 49  AHI 0.5 events per hour.       EPWORTH SLEEPINESS SCALE      [place] Sleepiness Scale ( ALEX Soriano  1990-1997[facility] [place]/English - Final version - 21 Nov 07 - [facility] [place].) 8/4/2022   Sitting and reading High chance of dozing   Watching TV High chance of dozing   Sitting, inactive in a public place (e.g. a theatre or a meeting) Moderate chance of dozing   As a passenger in a car for an hour without a break Moderate chance of dozing   Lying down to rest in the afternoon when circumstances permit High chance of dozing   Sitting and talking to someone Moderate chance of dozing   Sitting quietly after a lunch without alcohol High chance of dozing   In a car, while stopped for a few minutes in traffic Slight chance  of dozing   Paris Score (MC) 19   Paris Score (Sleep) 19       INSOMNIA SEVERITY INDEX (BRITTNEY)      Insomnia Severity Index (BRITTNEY) 8/4/2022   Difficulty falling asleep 0   Difficulty staying asleep 2   Problems waking up too early 4   How SATISFIED/DISSATISFIED are you with your CURRENT sleep pattern? 3   How NOTICEABLE to others do you think your sleep problem is in terms of impairing the quality of your life? 2   How WORRIED/DISTRESSED are you about your current sleep problem? 1   To what extent do you consider your sleep problem to INTERFERE with your daily functioning (e.g. daytime fatigue, mood, ability to function at work/daily chores, concentration, memory, mood, etc.) CURRENTLY? 2   BRITTNEY Total Score 14       Guidelines for Scoring/Interpretation:  Total score categories:  0-7 = No clinically significant insomnia   8-14 = Subthreshold insomnia   15-21 = Clinical insomnia (moderate severity)  22-28 = Clinical insomnia (severe)  Used via courtesy of www.Hapzing.va.gov with permission from Ulices Silva PhD., [facility]       Past medical/surgical history, family history, social history, medications and allergies were reviewed.      No current outpatient medications on file.     No current facility-administered medications for this visit.          Problem List:  Patient Active Problem List    Diagnosis Date Noted     Atrial fibrillation (H) 11/20/2017     Priority: Medium     diagnosed with atrial fibrillation in October 2015, s/p DCCV 10/2015 and 11/20/17       MILAGROS (obstructive sleep apnea)- moderate (AHI 25) 01/26/2016     Priority: Medium     Study Date: 12/16/2015 (206.0 lbs)- Apnea/hypopnea index was 25.9 events per hour.  The REM AHI was n/a.  The supine AHI was 25.9 events per hour. RDI was 25.9 events per hour. Lowest oxygen saturation was 89.0%.  Time spent below 89% was 0 minutes.  Final  pressure was 9 cmH2O with an AHI of 5.9 events per hour.  Time in REM supine on final pressure was 0  minutes.        Chronic insomnia 08/22/2016     Priority: Low        There were no vitals taken for this visit.

## 2022-08-04 NOTE — NURSING NOTE
2 year appointment reminder message was created and will be sent out 2 - 3 months prior to next appointment due date. Mehran Trivedi, [facility]

## 2022-08-04 NOTE — PATIENT INSTRUCTIONS
Read the book Say Good Night To Insomnia           Insomnia and Behavioral Sleep Medicine Program    The [facility] Insomnia and Behavioral Sleep Medicine Program provides non-drug treatment for sleep problems including:    Cognitive-behavioral Therapies for Insomnia (CBT-I)  Management of Shift-work and Jet Lag  Management of Delayed, Advanced and Irregular Circadian Rhythm Sleep Disorders  Imagery Rehearsal Therapy (IRT) for Nightmare Disorder  PAP Therapy Desensitization    You have been referred for consultation with a sleep psychologist who specializes in behavioral sleep medicine and treatment of insomnia.  The [facility] Insomnia and Behavioral Sleep Medicine Program offers individualized telehealth services through our [facility] and online CBT-I.    Preparing for your Consultation    You will need to keep a Sleep Diary for at least a week prior to your visit. Complete the sleep diary each day first thing after you get up by answering a few key questions about your sleep using our convenient mobile laura or paper sleep diary.  Your answers should be based on your recall of the past 24 hours.  Avoid watching the clock or recording data during the night.     Insomnia  Laura    The Insomnia  mobile laura  is a convenient way to keep track of your sleep prior to your sleep consultation.  Simply download the free laura on your Apple or Android phone and record your information each morning.  The laura includes training, self-assessment, and sleep schedule recommendations.  Prior to your consultation we recommend you use only the sleep diary function. You can e-mail yourself a copy of your sleep diary data by going to the Settings section and using the [place] User Data function.  During your consultation your provider will review the data with you.          [facility] Sleep Diary    You can also track your sleep using the [facility] paper sleep diary.  You can  upload your sleep diary and send it via a SvitStyle message, fax it to [phone], or have it with you at the time of your consultation.            CBT-I:  Frequently Asked Questions    What is CBT-I?    Cognitive Behavioral Therapy for Insomnia, also known as CBT-I, is a highly effective non-drug treatment for insomnia. The American College of Physicians recommends CBT-I as the first treatment for chronic insomnia.  Research has shown CBT-I to be safer and more effective long term than sleeping pills.    What does CBT-I involve?     CBT-I targets behaviors that lead to chronic insomnia:  Habits that weaken the bed as a cue for sleep  Habits that weaken your body's sleep drive and sleep/wake clock   Unhelpful sleep thoughts that increase sleep-related worry and arousal.    The process involves 3-6 telehealth visits that guide you to implement proven strategies to get a better night's sleep.    People often see improvement in their sleep within a few weeks. Research shows if you keep practicing the skills you learn your sleep is likely to continue to improve 6-12 months after treatment.    Does this program prescribe or manage sleep medication?    No.  Your prescribing provider is responsible to assist you in managing your sleep medications.  Some people choose to stop using sleep medication prior to or during CBT-I.  Our program can work with your prescribing provider to help reduce or eliminate use of sleep medications.     Getting Started Today!    If you haven't already done so, we recommend you consider making the following changes to your sleep habits prior to your sleep consultation:     Reduce your consumption of caffeine and alcohol.  Both can disrupt sleep and make strengthening your sleep more difficult.  Specifically:    - Avoid caffeine within 6 hours of bedtime   - No more than 3 caffeinated beverages per day (e.g. 8 oz. cup coffee or 12 oz. cup soda)            - No alcohol within 3 hours of  bedtime    Make sure your bedroom is quiet, comfortable and dark.  Noise, light and an uncomfortable sleep space can harm your sleep.      Keep the same sleep schedule 7 days a week.unless you do shift work.      Online CBT-I     If you want to get started today, research indicates that online CBT-I can be effective for some individuals. These programs requires comfort with darnell-based or online learning.  However, digital CBT-I programs are not for everyone.  Contraindications include:    Seizure disorders,   Bipolar disorder,   Unstable medical or mental health conditions,   Frailty or risk of falling  Pregnancy    You should consult a sleep specialist before using these resources if you have:    Sleep Apnea  Restless Leg Syndrome  Sleep Walking  REM behavior disorder  Night Terrors  Excessive Daytime Sleepiness  Are engaged in shift work  Use prescription sleep medication    Our Online CBT-I program    If your sleep provider recommends online CBT-I for you , the cost for an entire 6-week program is $40.    To get started, copy and paste the link below which will take you to the landing page to register:                           [email or web address].[facility]Circuit[facility] TechnologiesFranc[facility]er[facility]               If you wish to complete the online CBT-I program but do not plan to follow-up with a sleep provider, you are set to begin the program.    If you are planning to work with an [facility] sleep provider, there are a couple of extra steps you can take to share your sleep data with your sleep provider.  To share sleep log data, go to the left side navigation and click on the  share sleep log  button:         You will be taken to the page below where you will enter  the provider code [facility] into the box.          Once you press the locate button, the information for [facility] will pop up as below.  By pressing the Submit button your data will be sent to our  secure [facility] sleep program portal for review by your sleep provider. You  will only need to do this step once.                                  Self-help Workbooks for Insomnia    If you have found self-help books useful in the past, you may want to consider reading one of the following books prior to your consultation:    Say Deborah to Insomnia: The Six-Week, Drug-Free Program Developed at [facility].  Martínez Moss MD. Available in paperback, Arlen, and audiobook.    Overcoming Insomnia: A Cognitive-Behavioral Therapy Approach, Workbook.  Vinnie Guzman, PhD  and Thelma Martinez, PhD.  Available in paperback and Arlen.    Quiet Your Mind and Get to Sleep: Solutions to Insomnia for Those with Depression, Anxiety, or Chronic Pain.  Hilda Lee, PhD and Thelma Martinez, PhD.  Available in paperback and Arlen

## 2022-08-10 ENCOUNTER — HOSPITAL ENCOUNTER (OUTPATIENT)
Facility: CLINIC | Age: 52
Discharge: HOME | End: 2022-08-10
Attending: COLON & RECTAL SURGERY | Admitting: COLON & RECTAL SURGERY
Payer: COMMERCIAL

## 2022-08-10 VITALS
OXYGEN SATURATION: 96 % | RESPIRATION RATE: 20 BRPM | SYSTOLIC BLOOD PRESSURE: 129 MMHG | DIASTOLIC BLOOD PRESSURE: 115 MMHG | BODY MASS INDEX: 26.25 KG/M2 | HEART RATE: 62 BPM | WEIGHT: 210 LBS

## 2022-08-10 LAB — COLONOSCOPY: NORMAL

## 2022-08-10 PROCEDURE — G0500 MOD SEDAT ENDO SERVICE >5YRS: HCPCS | Performed by: COLON & RECTAL SURGERY

## 2022-08-10 PROCEDURE — G0121 COLON CA SCRN NOT HI RSK IND: HCPCS | Performed by: COLON & RECTAL SURGERY

## 2022-08-10 PROCEDURE — 45378 DIAGNOSTIC COLONOSCOPY: CPT | Performed by: COLON & RECTAL SURGERY

## 2022-08-10 PROCEDURE — 250N000011 HC RX IP 250 OP 636: Performed by: COLON & RECTAL SURGERY

## 2022-08-10 RX ORDER — ONDANSETRON 2 MG/ML
4 INJECTION INTRAMUSCULAR; INTRAVENOUS
Status: DISCONTINUED | OUTPATIENT
Start: 2022-08-10 | End: 2022-08-10 | Stop reason: HOSPADM

## 2022-08-10 RX ORDER — LIDOCAINE 40 MG/G
CREAM TOPICAL
Status: DISCONTINUED | OUTPATIENT
Start: 2022-08-10 | End: 2022-08-10 | Stop reason: HOSPADM

## 2022-08-10 RX ORDER — FENTANYL CITRATE 50 UG/ML
INJECTION, SOLUTION INTRAMUSCULAR; INTRAVENOUS PRN
Status: COMPLETED | OUTPATIENT
Start: 2022-08-10 | End: 2022-08-10

## 2022-08-10 RX ADMIN — FENTANYL CITRATE 100 MCG: 50 INJECTION, SOLUTION INTRAMUSCULAR; INTRAVENOUS at 07:51

## 2022-08-10 RX ADMIN — MIDAZOLAM 2 MG: 1 INJECTION INTRAMUSCULAR; INTRAVENOUS at 07:52

## 2022-08-10 ASSESSMENT — ACTIVITIES OF DAILY LIVING (ADL): ADLS_ACUITY_SCORE: 33

## 2022-08-10 NOTE — H&P
Colon & Rectal Surgery History and Physical  Pre-Endoscopy Procedure Note    History of Present Illness   I have been asked by Dr. Bermudez to evaluate this 52 year old male for colorectal cancer screening. He currently denies any abdominal pain, weight loss, bleeding per rectum, or recent change in bowel habits.    Past Medical History  Diagnosis Date     Allergic state      Atrial fibrillation       Chest pain 4/18/2019     Sleep apnea        Past Surgical History  Procedure Laterality Date     ANESTHESIA CARDIOVERSION N/A 11/20/2017    Procedure: ANESTHESIA CARDIOVERSION;  Cardioversion ;  Surgeon: GENERIC ANESTHESIA PROVIDER;  Location: [facility] OR        Medications  No medications prior to admission.       Allergies  Allergen Reactions     Dairy Products [Milk Protein Extract] GI Disturbance     Wheat Gluten [Gluten Meal] GI Disturbance        Family History   Family history includes Prostate Cancer in his brother.     Social History   He reports that he has never smoked. He has never used smokeless tobacco. He reports current alcohol use. He reports that he does not use drugs.      Review of Systems   Constitutional:  No fever, weight change or fatigue.    Eyes:     No dry eyes or vision changes.   Ears/Nose/Throat/Neck:  No oral ulcers, sore throat or voice change.    Cardiovascular:   No palpitations, syncope, angina or edema.   Respiratory:    No chest pain, excessive sleepiness, shortness of breath or hemoptysis.    Gastrointestinal:   No abdominal pain, nausea, vomiting, diarrhea or heartburn.    Genitourinary:   No dysuria, hematuria, urinary retention or urinary frequency.   Musculoskeletal:  No joint swelling or arthralgias.    Dermatologic:  No skin rash or other skin changes.   Neurologic:    No focal weakness or numbness. No neuropathy.   Psychiatric:    No depression, anxiety, suicidal ideation, or paranoid ideation.   Endocrine:   No cold or heat intolerance, polydipsia, hirsutism, change in libido, or  flushing.   Hematology/Lymphatic:  No bleeding or lymphadenopathy.    Allergy/Immunology:  No rhinitis or hives.     Physical Exam   Vitals:  Blood pressure 120/81, pulse 52, resp. rate 7, weight 95.3 kg (210 lb), SpO2 96 %.    General:  Alert and oriented to person, place and time   Airway: Normal oropharyngeal airway and neck mobility   Lungs:  Clear bilaterally   Heart:  Regular rate and rhythm   Abdomen: Soft, NT, ND, no masses   Extremities: Warm, good capillary refill    ASA Grade: II (mild systemic disease)    Impression: Cleared for use of conscious sedation for colorectal cancer screening    Plan: Proceed with colonoscopy     Ragini Caruso MD  [facility]  [phone]

## 2022-09-23 ENCOUNTER — MYC MEDICAL ADVICE (OUTPATIENT)
Dept: SLEEP MEDICINE | Facility: CLINIC | Age: 52
End: 2022-09-23

## 2022-11-19 ENCOUNTER — HEALTH MAINTENANCE LETTER (OUTPATIENT)
Age: 52
End: 2022-11-19

## 2023-06-15 ENCOUNTER — TELEPHONE (OUTPATIENT)
Dept: CARDIOLOGY | Facility: CLINIC | Age: 53
End: 2023-06-15
Payer: COMMERCIAL

## 2023-06-28 ENCOUNTER — NURSE TRIAGE (OUTPATIENT)
Dept: NURSING | Facility: CLINIC | Age: 53
End: 2023-06-28
Payer: COMMERCIAL

## 2023-06-28 ENCOUNTER — NURSE TRIAGE (OUTPATIENT)
Dept: CARDIOLOGY | Facility: CLINIC | Age: 53
End: 2023-06-28
Payer: COMMERCIAL

## 2023-06-28 NOTE — TELEPHONE ENCOUNTER
Left voicemail for patient. Propafenone Rx sent a little over an hour ago. Asked for return call if ongoing AF after propafenone.

## 2023-06-28 NOTE — TELEPHONE ENCOUNTER
"Patient spoke with Triage stating he has been in A-Fib for the past 25-30 minutes. No chest pain, palpitations or SOB. He states his HR is irregular. No current reading to provide. Patient has H/O A-Fib. Patient says he needs renewal of his propafenone to [facility] in [place] as his current prescription is . Routing to his care team with Dr. Stern for follow-up.    1. DESCRIPTION: \"Please describe your heart rate or heartbeat that you are having\" (e.g., fast/slow, regular/irregular, skipped or extra beats, \"palpitations\") A-Fib.  2. ONSET: \"When did it start?\" (Minutes, hours or days) 25-30 minutes ago.  3. DURATION: \"How long does it last\" (e.g., seconds, minutes, hours) Current.  4. PATTERN \"Does it come and go, or has it been constant since it started?\" \"Does it get worse with exertion?\" \"Are you feeling it now?\" Constant.  5. TAP: \"Using your hand, can you tap out what you are feeling on a chair or table in front of you, so that I can hear?\" (Note: not all patients can do this)   6. HEART RATE: \"Can you tell me your heart rate?\" \"How many beats in 15 seconds?\" (Note: not all patients can do this) Un-kown.  7. RECURRENT SYMPTOM: \"Have you ever had this before?\" If Yes, ask: \"When was the last time?\" and \"What happened that time?\" Yes. H/O A-Fib.   8. CAUSE: \"What do you think is causing?\" A-Fib.  9. CARDIAC HISTORY: \"Do you have any history of heart disease?\" (e.g., heart attack, angina, bypass surgery, angioplasty, arrhythmia) A-Fib.   10. OTHER SYMPTOMS: \"Do you have any other symptoms?\" (e.g., dizziness, chest pain, sweating, difficulty breathing) None.    "

## 2023-06-29 NOTE — TELEPHONE ENCOUNTER
Dimitris calls and says that he is in A-Fib right now. Pt. Says that he feels his irregular heart beat. Pt. Says that it has been this way since noon today. Pt. Refuses to call 911 and will wait to see if it gets better.    Reason for Disposition    [1] Dizziness, lightheadedness, or weakness AND [2] heart beating very rapidly (e.g., > 140 / minute)    Additional Information    Negative: Passed out (i.e., lost consciousness, collapsed and was not responding)    Negative: Shock suspected (e.g., cold/pale/clammy skin, too weak to stand, low BP, rapid pulse)    Negative: Difficult to awaken or acting confused (e.g., disoriented, slurred speech)    Negative: Visible sweat on face or sweat dripping down face    Negative: Unable to walk, or can only walk with assistance (e.g., requires support)    Negative: [1] Received SHOCK from implantable cardiac defibrillator AND [2] persisting symptoms (i.e., palpitations, lightheadedness)    Protocols used: HEART RATE AND HEARTBEAT [id number]

## 2023-06-30 DIAGNOSIS — I48.0 PAROXYSMAL ATRIAL FIBRILLATION (H): Primary | ICD-10-CM

## 2023-06-30 RX ORDER — LIDOCAINE 40 MG/G
CREAM TOPICAL
Status: CANCELLED | OUTPATIENT
Start: 2023-06-30

## 2023-06-30 RX ORDER — MAGNESIUM SULFATE HEPTAHYDRATE 40 MG/ML
2 INJECTION, SOLUTION INTRAVENOUS
Status: CANCELLED | OUTPATIENT
Start: 2023-06-30

## 2023-06-30 RX ORDER — POTASSIUM CHLORIDE 1500 MG/1
20 TABLET, EXTENDED RELEASE ORAL
Status: CANCELLED | OUTPATIENT
Start: 2023-06-30

## 2023-06-30 RX ORDER — POTASSIUM CHLORIDE 1500 MG/1
40 TABLET, EXTENDED RELEASE ORAL
Status: CANCELLED | OUTPATIENT
Start: 2023-06-30

## 2023-09-09 ENCOUNTER — HEALTH MAINTENANCE LETTER (OUTPATIENT)
Age: 53
End: 2023-09-09

## 2023-10-10 ENCOUNTER — OFFICE VISIT (OUTPATIENT)
Dept: INTERNAL MEDICINE | Facility: CLINIC | Age: 53
End: 2023-10-10
Payer: COMMERCIAL

## 2023-10-10 ENCOUNTER — LAB (OUTPATIENT)
Dept: LAB | Facility: CLINIC | Age: 53
End: 2023-10-10
Payer: COMMERCIAL

## 2023-10-10 VITALS
HEART RATE: 59 BPM | BODY MASS INDEX: 27.14 KG/M2 | SYSTOLIC BLOOD PRESSURE: 133 MMHG | OXYGEN SATURATION: 97 % | WEIGHT: 217.1 LBS | DIASTOLIC BLOOD PRESSURE: 87 MMHG

## 2023-10-10 DIAGNOSIS — R31.9 HEMATURIA, UNSPECIFIED TYPE: ICD-10-CM

## 2023-10-10 DIAGNOSIS — R03.0 ELEVATED BP WITHOUT DIAGNOSIS OF HYPERTENSION: ICD-10-CM

## 2023-10-10 DIAGNOSIS — Z00.00 HEALTH MAINTENANCE EXAMINATION: Primary | ICD-10-CM

## 2023-10-10 DIAGNOSIS — E78.5 HYPERLIPIDEMIA, UNSPECIFIED HYPERLIPIDEMIA TYPE: ICD-10-CM

## 2023-10-10 DIAGNOSIS — Z00.00 HEALTH MAINTENANCE EXAMINATION: ICD-10-CM

## 2023-10-10 DIAGNOSIS — Z82.49 FAMILY HISTORY OF BRAIN ANEURYSM: ICD-10-CM

## 2023-10-10 LAB
ALBUMIN SERPL BCG-MCNC: 4.6 G/DL (ref 3.5–5.2)
ALBUMIN UR-MCNC: NEGATIVE MG/DL
ALP SERPL-CCNC: 81 U/L (ref 40–129)
ALT SERPL W P-5'-P-CCNC: 28 U/L (ref 0–70)
ANION GAP SERPL CALCULATED.3IONS-SCNC: 9 MMOL/L (ref 7–15)
APPEARANCE UR: CLEAR
AST SERPL W P-5'-P-CCNC: 24 U/L (ref 0–45)
BILIRUB SERPL-MCNC: 0.8 MG/DL
BILIRUB UR QL STRIP: NEGATIVE
BUN SERPL-MCNC: 15.8 MG/DL (ref 6–20)
CALCIUM SERPL-MCNC: 9.6 MG/DL (ref 8.6–10)
CHLORIDE SERPL-SCNC: 103 MMOL/L (ref 98–107)
CHOLEST SERPL-MCNC: 165 MG/DL
COLOR UR AUTO: NORMAL
CREAT SERPL-MCNC: 1.01 MG/DL (ref 0.67–1.17)
DEPRECATED HCO3 PLAS-SCNC: 28 MMOL/L (ref 22–29)
EGFRCR SERPLBLD CKD-EPI 2021: 88 ML/MIN/1.73M2
ERYTHROCYTE [DISTWIDTH] IN BLOOD BY AUTOMATED COUNT: 12.8 % (ref 10–15)
GLUCOSE SERPL-MCNC: 104 MG/DL (ref 70–99)
GLUCOSE UR STRIP-MCNC: NEGATIVE MG/DL
HBA1C MFR BLD: 5.6 %
HCT VFR BLD AUTO: 45.9 % (ref 40–53)
HDLC SERPL-MCNC: 39 MG/DL
HGB BLD-MCNC: 15.6 G/DL (ref 13.3–17.7)
HGB UR QL STRIP: NEGATIVE
KETONES UR STRIP-MCNC: NEGATIVE MG/DL
LDLC SERPL CALC-MCNC: 108 MG/DL
LEUKOCYTE ESTERASE UR QL STRIP: NEGATIVE
MCH RBC QN AUTO: 30.4 PG (ref 26.5–33)
MCHC RBC AUTO-ENTMCNC: 34 G/DL (ref 31.5–36.5)
MCV RBC AUTO: 90 FL (ref 78–100)
NITRATE UR QL: NEGATIVE
NONHDLC SERPL-MCNC: 126 MG/DL
PH UR STRIP: 6 [PH] (ref 5–7)
PLATELET # BLD AUTO: 281 10E3/UL (ref 150–450)
POTASSIUM SERPL-SCNC: 4.6 MMOL/L (ref 3.4–5.3)
PROT SERPL-MCNC: 7.4 G/DL (ref 6.4–8.3)
PSA SERPL DL<=0.01 NG/ML-MCNC: 0.21 NG/ML (ref 0–3.5)
RBC # BLD AUTO: 5.13 10E6/UL (ref 4.4–5.9)
SODIUM SERPL-SCNC: 140 MMOL/L (ref 135–145)
SP GR UR STRIP: 1.02 (ref 1–1.03)
TRIGL SERPL-MCNC: 92 MG/DL
UROBILINOGEN UR STRIP-MCNC: NORMAL MG/DL
WBC # BLD AUTO: 5.5 10E3/UL (ref 4–11)

## 2023-10-10 PROCEDURE — 80053 COMPREHEN METABOLIC PANEL: CPT | Performed by: PATHOLOGY

## 2023-10-10 PROCEDURE — 83036 HEMOGLOBIN GLYCOSYLATED A1C: CPT

## 2023-10-10 PROCEDURE — 80061 LIPID PANEL: CPT | Performed by: PATHOLOGY

## 2023-10-10 PROCEDURE — 99000 SPECIMEN HANDLING OFFICE-LAB: CPT | Performed by: PATHOLOGY

## 2023-10-10 PROCEDURE — 81003 URINALYSIS AUTO W/O SCOPE: CPT | Performed by: PATHOLOGY

## 2023-10-10 PROCEDURE — 85027 COMPLETE CBC AUTOMATED: CPT | Performed by: PATHOLOGY

## 2023-10-10 PROCEDURE — 99396 PREV VISIT EST AGE 40-64: CPT | Mod: GE

## 2023-10-10 PROCEDURE — 36415 COLL VENOUS BLD VENIPUNCTURE: CPT | Performed by: PATHOLOGY

## 2023-10-10 PROCEDURE — G0103 PSA SCREENING: HCPCS | Performed by: PATHOLOGY

## 2023-10-10 NOTE — PATIENT INSTRUCTIONS
Please schedule your CTA of the head as ordered   Please schedule an appointment with your cardiologist to discuss if there is any need for ongoing cosideration of anticoagulation (such as rivaroxaban as you previously were on) and or consider an antiplatelet such as Aspirin daily instead. This is in particular as you mentioned that you may have recently felt your heart in atrial fibrillation.     Today, reassuringly your heart was in normal sinus rhythm during our visit.     Please follow up as needed in clinic and with your PCP as previously determined.     It was a pleasure getting to take care of you.

## 2023-10-10 NOTE — PROGRESS NOTES
Subjective;     Lobito presents for physical today.   Inquires of his risks for brain aneurysm for him     His paternal cousin has a brain aneurysm at age 65,   His paternal grandmother also had one, she  in her 30s,   His sister was just found to have one at age 63   Brother had prostate cancer (61).       PMH: atrial fibrillation (previously on rivaroxaban 20 mg every day, propafenon - 3 years) follows with Dr. Stern, MILAGROS on CPAP , S    Endorses a 8lb weight gain in recent months.   Has long-standing diarrhea (had to be on imodium but stopped, as he thought it caused his afib, then since culturelle.).   Denies fevers, chills, night sweats, headaches dizzines, vision, taste, smell, hearing changes, weakness unilaterally, nausea, vomiting, hemoptysis, constipation, abdominal pain, hematuria, dyspnea.   Endorses a single recent episode of rust-colored urine that has since resolved, unclear but may have been vigorously exercising around the time.   Endorses baseline left foot numbness of the 2 and 3rd digits.       Objective:     Vitals: /87 (BP Location: Right arm, Patient Position: Sitting, Cuff Size: Adult Large)   Pulse 59   Wt 98.5 kg (217 lb 1.6 oz)   SpO2 97%   BMI 27.14 kg/m        General: alert and oriented x3, in no apparent cardiopulmonary distress   HEENT: normocephalic, atraumatic, MMM, PERRL, EOMI, no scleral icterus or chemosis, no conjunctival pallor   Neurologic: CN II-XII grossly intact, 5/5 strength in all 4 extremities, no dysmetria, normal gait, sensation grossly intact   Cardiac: Regular rate and rhythm, normal S1, S2, no murmurs gallops or rubs    Pulmonary: chest clear to auscultation bilaterally, no dullness to percussion, no wheezes, rales, or rhonchi   Abdomen: soft, non-distended, no irregular masses, abdominal aorta palpable, no organomegaly, no rebound or guarding.   Genitourinary: No urinary catheter present   Skin: No rashes, ecchymosis   Hematologic: no petechiae,  ecchymosis or   Lymphatic: No cervical, supra/infraclavicular or inguinal lymphadenopathy.   Extremities: No lower extremity edema, palpable dorsalis pedis and posterior tibialis pulses.   Musculoskeletal: No joint swelling, full ROM in all large joints   Psychiatric: Linear thought processes, normal speech, mood appropriate affect.       Labs reviewed 7/12/022 - Lipid panel notable for triglyceride 175, and HDL 34; CMP unremarkable, PSA wnl, HIV/HepC screen negative, CBC unremarkable       ASSESSMENT AND PLAN   53 y.o male with PMH of HLD atrial fibrillation (previously on rivaroxaban  & propafenone) follows with Dr. Stern, MILAGROS on CPAP, and family history of brain aneurysms who presents for his annual physical and to discuss his risk of brain aneurysms. I advised that patient discusses the need to continue on anticoagulation or at least rate control (CHADVASC score currently 1 for men may need Antiplatelet or consider anticoagulation again; guessing more like 2, as elevated BP here and last few encounters,.), as patient reports that he used to feel when he was in afib and this may have occurred recently. He reports shared decision making with his cardiologist Dr. Stern prior to this. Open to revisiting this with  cardiology. It is possible that his MILAGROS may have contributed and now that it is treated risks are low but would still love for cardiology to weigh in.       Dimitris was seen today for physical.    Diagnoses and all orders for this visit:    Health maintenance examination  -     PSA, screen; Future  -     Lipid Profile (Chol, Trig, HDL, LDL calc); Future  -     CBC with platelets; Future  -     Comprehensive metabolic panel (BMP + Alb, Alk Phos, ALT, AST, Total. Bili, TP); Future  -     Hemoglobin A1c; Future    Hyperlipidemia, unspecified hyperlipidemia type  -     Lipid Profile (Chol, Trig, HDL, LDL calc); Future  -     Hemoglobin A1c; Future    Hematuria, unspecified type  -     UA Macroscopic with  reflex to Microscopic and Culture - Lab Collect; Future    Family history of brain aneurysm  -     CTA Head Neck with Contrast; Future    Elevated BP without diagnosis of hypertension  -     24 Hour Blood Pressure Monitor - Adult; Future      The 10-year ASCVD risk score (Francheska FRANCIS, et al., 2019) is: 6%    Values used to calculate the score:      Age: 53 years      Sex: Male      Is Non- : No      Diabetic: No      Tobacco smoker: No      Systolic Blood Pressure: 133 mmHg      Is BP treated: No      HDL Cholesterol: 39 mg/dL      Total Cholesterol: 165 mg/dL     Health Maintenance   - Vaccines: due for covid-19, influenza, will get at pharmacy.    - Tdap up to date next due .    - Lung cancer screening    - colorectal cancer screenin/10/2022 was last, normal exam; next due 2032    - Activity and Dietary Counseling provided, also reiterated on result note.    - PSA wnl on 10/10/2023.      Follow up with PCP in 3-6 months.      Case discussed with Dr. Doug Tracey MD  PGY3   Internal Medicine Resident   [facility]        Answers submitted by the patient for this visit:  Symptoms you have experienced in the last 30 days (Submitted on 10/10/2023)  General Symptoms: No  Skin Symptoms: No  HENT Symptoms: No  EYE SYMPTOMS: No  HEART SYMPTOMS: No  LUNG SYMPTOMS: No  INTESTINAL SYMPTOMS: No  URINARY SYMPTOMS: No  REPRODUCTIVE SYMPTOMS: No  SKELETAL SYMPTOMS: No  BLOOD SYMPTOMS: No  NERVOUS SYSTEM SYMPTOMS: No  MENTAL HEALTH SYMPTOMS: No    While the patient was in clinic, I reviewed the pertinent medical history and results.  I discussed the current findings on physical examination, as well as the patient s diagnosis and treatment plan with the resident and agree with the information as documented with the following exceptions: none.  Doug Figueredo MD

## 2023-10-10 NOTE — NURSING NOTE
Dimitris Carroll is a 53 year old male patient that presents today in clinic for the following:    Chief Complaint   Patient presents with    Physical     Pt would like discuss family history of brain aneurysms     The patient's allergies and medications were reviewed as noted. A set of vitals were recorded as noted without incident. The patient does not have any other questions for the provider.    Vi Benitez, EMT at 7:53 AM on 10/10/2023

## 2023-10-18 ENCOUNTER — HOSPITAL ENCOUNTER (OUTPATIENT)
Dept: CARDIOLOGY | Facility: CLINIC | Age: 53
Discharge: HOME | End: 2023-10-18
Payer: COMMERCIAL

## 2023-10-18 ENCOUNTER — HOSPITAL ENCOUNTER (OUTPATIENT)
Dept: CT IMAGING | Facility: CLINIC | Age: 53
Discharge: HOME | End: 2023-10-18
Payer: COMMERCIAL

## 2023-10-18 DIAGNOSIS — Z82.49 FAMILY HISTORY OF BRAIN ANEURYSM: ICD-10-CM

## 2023-10-18 DIAGNOSIS — R03.0 ELEVATED BP WITHOUT DIAGNOSIS OF HYPERTENSION: ICD-10-CM

## 2023-10-18 PROCEDURE — 93786 AMBL BP MNTR W/SW REC ONLY: CPT

## 2023-10-18 PROCEDURE — 70496 CT ANGIOGRAPHY HEAD: CPT | Mod: 26 | Performed by: RADIOLOGY

## 2023-10-18 PROCEDURE — 70498 CT ANGIOGRAPHY NECK: CPT | Mod: 26 | Performed by: RADIOLOGY

## 2023-10-18 PROCEDURE — 250N000011 HC RX IP 250 OP 636

## 2023-10-18 PROCEDURE — 70498 CT ANGIOGRAPHY NECK: CPT

## 2023-10-18 PROCEDURE — 93790 AMBL BP MNTR W/SW I&R: CPT | Performed by: INTERNAL MEDICINE

## 2023-10-18 RX ORDER — IOPAMIDOL 755 MG/ML
67 INJECTION, SOLUTION INTRAVASCULAR ONCE
Status: COMPLETED | OUTPATIENT
Start: 2023-10-18 | End: 2023-10-18

## 2023-10-18 RX ADMIN — IOPAMIDOL 67 ML: 755 INJECTION, SOLUTION INTRAVENOUS at 08:46

## 2023-12-20 ENCOUNTER — OFFICE VISIT (OUTPATIENT)
Dept: CARDIOLOGY | Facility: CLINIC | Age: 53
End: 2023-12-20
Attending: NURSE PRACTITIONER
Payer: COMMERCIAL

## 2023-12-20 VITALS
BODY MASS INDEX: 27.19 KG/M2 | SYSTOLIC BLOOD PRESSURE: 133 MMHG | OXYGEN SATURATION: 99 % | HEART RATE: 56 BPM | DIASTOLIC BLOOD PRESSURE: 83 MMHG | WEIGHT: 217.5 LBS

## 2023-12-20 DIAGNOSIS — I48.0 PAROXYSMAL ATRIAL FIBRILLATION (H): ICD-10-CM

## 2023-12-20 DIAGNOSIS — I48.0 PAF (PAROXYSMAL ATRIAL FIBRILLATION) (H): Primary | ICD-10-CM

## 2023-12-20 LAB
ATRIAL RATE - MUSE: 57 BPM
DIASTOLIC BLOOD PRESSURE - MUSE: NORMAL MMHG
INTERPRETATION ECG - MUSE: NORMAL
P AXIS - MUSE: 60 DEGREES
PR INTERVAL - MUSE: 162 MS
QRS DURATION - MUSE: 98 MS
QT - MUSE: 424 MS
QTC - MUSE: 412 MS
R AXIS - MUSE: 46 DEGREES
SYSTOLIC BLOOD PRESSURE - MUSE: NORMAL MMHG
T AXIS - MUSE: 41 DEGREES
VENTRICULAR RATE- MUSE: 57 BPM

## 2023-12-20 PROCEDURE — 93010 ELECTROCARDIOGRAM REPORT: CPT | Performed by: INTERNAL MEDICINE

## 2023-12-20 PROCEDURE — 93005 ELECTROCARDIOGRAM TRACING: CPT

## 2023-12-20 PROCEDURE — 99213 OFFICE O/P EST LOW 20 MIN: CPT | Performed by: NURSE PRACTITIONER

## 2023-12-20 RX ORDER — PROPAFENONE HYDROCHLORIDE 300 MG/1
TABLET, COATED ORAL
Qty: 20 TABLET | Refills: 3 | Status: SHIPPED | OUTPATIENT
Start: 2023-12-20

## 2023-12-20 ASSESSMENT — PAIN SCALES - GENERAL: PAINLEVEL: NO PAIN (0)

## 2023-12-20 NOTE — LETTER
2023      RE: Dimitris Carroll  [address]  [facility] [address]       Dear Colleague,    Thank you for the opportunity to participate in the care of your patient, Dimitris Carroll, at the [facility] [place] [facility]. Please see a copy of my visit note below.        ELECTROPHYSIOLOGY CLINIC VISIT    Assessment/Recommendations   Assessment/Plan:    Mr. Carroll is a 53 year old male who has a past medical history significant for PAF (CHADSVASC 0) s/p DCCV 10/2015 and 17, and MILAGROS (uses CPAP). He presents today for follow up.   Paroxysmal Atrial Fibrillation:   We discussed in detail with the patient management/treatment options for Kenji includin. Stroke Prophylaxis:  CHADSVASC=  0, corresponding to a 0.2-0.5% annual stroke / systemic emolism event rate. indicating no need for long term oral anticoagulation. He is on ASA only.   2. Rate Control: None.  3. Rhythm Control: Cardioversion, Antiarrhythmics and/or ablation are options for rhythm control. He had DCCV in 10/2015, recurrence in 2016 and was given Rythmol and eventually converted without DCCV. Then DCCV 17. He has been instructed to use Rythmol as pill-in-the-pocket for any AF.  He takes Propafenone 600 mg at onset of AF symptoms.  He has been instructed to call if he remains in AF for 24 hours or come in for evaluation immediately if he is unwell. Will continue this. He has not had any AF over last year.   4. Risk Factor Management: maintain tight BP control and continued MILAGROS treatment. Continue with diet/exercising that he has been doing well with.      Follow up in 2 years or sooner if need arises.        History of Present Illness/Subjective    Mr. Dimitris Carroll is a 53 year old male who comes in today for EP follow-up of PAF.    Mr. Carroll is a 53 year old male who has a past medical history significant for PAF (CHADSVASC 0) s/p DCCV 10/2015 and  11/20/17, and MILAGROS (uses CPAP). He presents today for follow up.      He was first diagnosed with A.fib in October 2015 when he suddenly developed palpitations, dizziness, and dyspnea on 10/10/15. He presented to the urgent care and ultimately was sent to the ER on 10/12/15 where he was diagnosed with atrial fibrillation and underwent DCCV x4. The first 3 shocks (200 J) were followed by return of AF and he was subsequently loaded with 300 mg IV Amiodarone and cardioverted again which resulted in sustained NSR. His initial Echo showed LVEF 40-45%. He was started on Pradaxa, Metoprolol XL 25 mg,and a short course of amiodarone.He was also noted to have elevated TSH He was referred to EP for further management. He saw EP in 11/2015. His amiodarone had already been discontinued on 10/28/15. He denied any recurrence of atrial fibrillation. He had a CMRI done in November 2015 showed mild non-ischemic cardiomyopathy with LVEF of 53% and no DE. A stress echo done in December 2015 showed LVEF 55-60%. Cardiac event monitoring showed sinus throughout with rare PACs and PVCs. 9 triggered events all showed NSR some was PACs.  He underwent a sleep study which demonstrated sleep apnea for which he was started on home CPAP. He then had ER visit on 5/13/16 for AF with RVR. He was given 600 mg Rythmol and offered DCCV; however, patient declined. He was started on Xarelto with plan for outpatient DCCV if AF did not self convert. He did eventually convert and did not require DCCV. He then stopped his Xarelto given no need for intervention.      He then presented to [facility] on 11/19/17 with his AF symptoms (palpitations, dizziness, and SANCHEZ). He reports onset of symptoms on 11/18/17 in the afternoon/evening. He was on a pill-in-the-pocket approach with Rythmol; however, he accidentally thought it was amiodarone and took an amiodarone pill. He took another amiodarone in the morning on 11/19/17 and when symptoms persisted he came into [facility] for  evaluation. He was found in AF and was admitted for further management. He reported having more alcohol than usual this weekend about 5 drinks in a 6 hour period. He was started on Xarelto and underwent RYDER/DCCV on 11/20/17. He followed up with EP in 12/2017 and was doing well without any further AF episodes. He stopped Xarelto 1 month after his DCCV.     EP Visit 1/2020: He presents today for follow up. He was last seen in clinic on 11/9/18. Since last visit, Lobito states he has been doing well. He has only had to use his pill-in-the-pocket propafenone once since last follow up. His symptoms abated and he did not come in for evaluation. He denies any chest pain/pressures, dizziness, lightheadedness, leg/ankle swelling, PND, orthopnea,or syncopal symptoms. He just started working out again last month. Presenting 12 lead ECG shows unusual p wave axis Vent Rate 53 bpm,  ms, QRS 90 ms,  ms. Current cardiac medications include: Toprol XL, ASA, and Propafenone pill-in-the pocket.He had a TTE 12/9/2018 that was normal.     EP Visit 1/2021: Patient presents today for follow up. He is feeling well. Over the past year he got motivated to become more healthy--he started exercising and paying attention to his diet, ended up losing 20 lbs. He has not gone into atrial fibrillation over the past year. Has been working from home, trying to avoid getting corona virus.    EP Visit 12/15/21: He presents today for follow up. He reports feeling well. He denies chest discomfort, palpitations, abdominal fullness/bloating or peripheral edema, shortness of breath, paroxysmal nocturnal dyspnea, orthopnea, lightheadedness, dizziness, pre-syncope, or syncope. Presenting 12 lead ECG shows SB Vent Rate 58 bpm,  ms,  ms, QTc 416 ms. Current cardiac medications include: Propafenone and ASA.     He presents today for follow up. He reports feeling well. He had one episode of AF that he took propafenone for and converted.  Otherwise, no further episodes. He denies chest discomfort, palpitations, abdominal fullness/bloating or peripheral edema, shortness of breath, paroxysmal nocturnal dyspnea, orthopnea, lightheadedness, dizziness, pre-syncope, or syncope. Presenting 12 lead ECG shows SB Vent Rate 57 bpm,  ms, QRS 98 ms, QTc 412 ms. Current cardiac medications include: Propafenone .       I have reviewed and updated the patient's Past Medical History, Social History, Family History and Medication List.     Cardiographics (Personally Reviewed) :   12/3/18 Echo:   Interpretation Summary  Global and regional left ventricular function is normal with an EF of 60-65%.  Right ventricular function, chamber size, wall motion, and thickness are  normal.  Both atria appear normal.  Dilation of the inferior vena cava is present with normal respiratory  variation in diameter.  No pericardial effusion is present.  LV function has normalized and patient in sinus rhythm.    4/18/19 NM Stress Test:   Impression:  1. Normal myocardial SPECT perfusion study without any evidence of  ischemia or infarct.  2. Normal left ventricular ejection fraction/myocardial contractility.  3. Two lung nodules in the right lung smaller than 4 mm, favored as  Benign.    11/2015 CMR:  1. The left ventricle is mildly dilated. The wall thickness is normal.  The global systolic function is mildly reduced. The quantitative LV  ejection fraction is 53%. There is mild global hypokinesis.     LV volumes absolute and indexed to BSA with age and gender-matched  normal values in parentheses are listed below:     ED volume: 223 ml (101 - 236 ml)  ED volume index: 104 ml/m2 (52 - 112 ml/m2)  ES volume: 105 ml (28 - 93 ml)  ES volume index: 49 ml/m2     2. The right ventricle is mildly dilated in cavity size with mildly  reduced global systolic function. The quantitative RV ejection  fraction is 51%.     RV volumes absolute and indexed to BSA with age and gender-matched  normal  values in parentheses are listed below:     ED volume: 274 ml (110 - 243 ml)  ED volume index: 128 ml/m2 (58 - 115 ml/m2)  ES volume: 134 ml (46 - 112 ml)  ES volume index: 63 ml/m2     3. There is moderate biatrial enlargement.     4. The aortic valve is trileaflet in morphology. There is no  significant aortic valve stenosis or regurgitation.      5. There is no other significant valvular disease.      6. Delayed enhancement imaging demonstrates no evidence of myocardial  infarction, fibrosis or infiltrative disease. This is consistent with  a non-ischemic cardiomyopathy.    IMPRESSION:  Mild non-ischemic cardiomyopathy with LVEF of 53%.       Physical Examination   /83 (BP Location: Right arm, Patient Position: Chair, Cuff Size: Adult Large)   Pulse 56   Wt 98.7 kg (217 lb 8 oz)   SpO2 99%   BMI 27.19 kg/m    Wt Readings from Last 3 Encounters:   10/10/23 98.5 kg (217 lb 1.6 oz)   08/10/22 95.3 kg (210 lb)   08/04/22 97.1 kg (214 lb)     General Appearance:   Alert, well-appearing and in no acute distress.   HEENT: Atraumatic, normocephalic. PERRL.  MMM.   Chest/Lungs:   Respirations unlabored.  Lungs are clear to auscultation.   Cardiovascular:   Regular rate and rhythm.  S1/S2. No murmur.    Abdomen:  Soft, nontender, nondistended.   Extremities: No cyanosis or clubbing. No edema.    Musculoskeletal: Moves all extremities.  Gait normal.   Skin: Warm, dry, intact.    Neurologic: Mood and affect are appropriate.  Alert and oriented to person, place, time, and situation.          Medications  Allergies   Current Outpatient Medications   Medication Sig Dispense Refill    propafenone (RYTHMOL) 300 MG tablet Take 2 tablets (600 mg) by mouth once as needed For recurrence of atrial fibrillation. One dose per 24 hours as needed (Patient not taking: Reported on 10/10/2023) 20 tablet 0    rivaroxaban ANTICOAGULANT (XARELTO) 20 MG TABS tablet Take 1 tablet (20 mg) by mouth daily (with dinner) (Patient not taking:  Reported on 10/10/2023) 30 tablet 1    Allergies   Allergen Reactions    Dairy Products [Milk Protein] GI Disturbance         Lab Results (Personally Reviewed)    Chemistry/lipid CBC Cardiac Enzymes/BNP/TSH/INR   Lab Results   Component Value Date    BUN 15.8 10/10/2023     10/10/2023    CO2 28 10/10/2023     Creatinine   Date Value Ref Range Status   10/10/2023 1.01 0.67 - 1.17 mg/dL Final   04/18/2019 1.03 0.66 - 1.25 mg/dL Final       Lab Results   Component Value Date    CHOL 165 10/10/2023    HDL 39 (L) 10/10/2023     (H) 10/10/2023      Lab Results   Component Value Date    WBC 5.5 10/10/2023    HGB 15.6 10/10/2023    HCT 45.9 10/10/2023    MCV 90 10/10/2023     10/10/2023    Lab Results   Component Value Date    TSH 1.17 05/13/2016    INR 0.96 11/20/2017        The patient states understanding and is agreeable with the plan.   ARIANE Whitfield CNP  Electrophysiology Consult Service  Pager: [phone]

## 2023-12-20 NOTE — PROGRESS NOTES
ELECTROPHYSIOLOGY CLINIC VISIT    Assessment/Recommendations   Assessment/Plan:    Mr. Carroll is a 53 year old male who has a past medical history significant for PAF (CHADSVASC 0) s/p DCCV 10/2015 and 17, and MILAGROS (uses CPAP). He presents today for follow up.   Paroxysmal Atrial Fibrillation:   We discussed in detail with the patient management/treatment options for A.fib includin. Stroke Prophylaxis:  CHADSVASC=  0, corresponding to a 0.2-0.5% annual stroke / systemic emolism event rate. indicating no need for long term oral anticoagulation. He is on ASA only.   2. Rate Control: None.  3. Rhythm Control: Cardioversion, Antiarrhythmics and/or ablation are options for rhythm control. He had DCCV in 10/2015, recurrence in 2016 and was given Rythmol and eventually converted without DCCV. Then DCCV 17. He has been instructed to use Rythmol as pill-in-the-pocket for any AF.  He takes Propafenone 600 mg at onset of AF symptoms.  He has been instructed to call if he remains in AF for 24 hours or come in for evaluation immediately if he is unwell. Will continue this. He has not had any AF over last year.   4. Risk Factor Management: maintain tight BP control and continued MILAGROS treatment. Continue with diet/exercising that he has been doing well with.      Follow up in 2 years or sooner if need arises.        History of Present Illness/Subjective    Mr. Dimitris Carroll is a 53 year old male who comes in today for EP follow-up of PAF.    Mr. Carroll is a 53 year old male who has a past medical history significant for PAF (CHADSVASC 0) s/p DCCV 10/2015 and 17, and MILAGROS (uses CPAP). He presents today for follow up.      He was first diagnosed with A.fib in 2015 when he suddenly developed palpitations, dizziness, and dyspnea on 10/10/15. He presented to the urgent care and ultimately was sent to the ER on 10/12/15 where he was diagnosed with atrial fibrillation and underwent DCCV x4. The  first 3 shocks (200 J) were followed by return of AF and he was subsequently loaded with 300 mg IV Amiodarone and cardioverted again which resulted in sustained NSR. His initial Echo showed LVEF 40-45%. He was started on Pradaxa, Metoprolol XL 25 mg,and a short course of amiodarone.He was also noted to have elevated TSH He was referred to EP for further management. He saw EP in 11/2015. His amiodarone had already been discontinued on 10/28/15. He denied any recurrence of atrial fibrillation. He had a CMRI done in November 2015 showed mild non-ischemic cardiomyopathy with LVEF of 53% and no DE. A stress echo done in December 2015 showed LVEF 55-60%. Cardiac event monitoring showed sinus throughout with rare PACs and PVCs. 9 triggered events all showed NSR some was PACs.  He underwent a sleep study which demonstrated sleep apnea for which he was started on home CPAP. He then had ER visit on 5/13/16 for AF with RVR. He was given 600 mg Rythmol and offered DCCV; however, patient declined. He was started on Xarelto with plan for outpatient DCCV if AF did not self convert. He did eventually convert and did not require DCCV. He then stopped his Xarelto given no need for intervention.      He then presented to [facility] on 11/19/17 with his AF symptoms (palpitations, dizziness, and SANCHEZ). He reports onset of symptoms on 11/18/17 in the afternoon/evening. He was on a pill-in-the-pocket approach with Rythmol; however, he accidentally thought it was amiodarone and took an amiodarone pill. He took another amiodarone in the morning on 11/19/17 and when symptoms persisted he came into [facility] for evaluation. He was found in AF and was admitted for further management. He reported having more alcohol than usual this weekend about 5 drinks in a 6 hour period. He was started on Xarelto and underwent RYDER/DCCV on 11/20/17. He followed up with EP in 12/2017 and was doing well without any further AF episodes. He stopped Xarelto 1 month after his  DCCV.     EP Visit 1/2020: He presents today for follow up. He was last seen in clinic on 11/9/18. Since last visit, Lobito states he has been doing well. He has only had to use his pill-in-the-pocket propafenone once since last follow up. His symptoms abated and he did not come in for evaluation. He denies any chest pain/pressures, dizziness, lightheadedness, leg/ankle swelling, PND, orthopnea,or syncopal symptoms. He just started working out again last month. Presenting 12 lead ECG shows unusual p wave axis Vent Rate 53 bpm,  ms, QRS 90 ms,  ms. Current cardiac medications include: Toprol XL, ASA, and Propafenone pill-in-the pocket.He had a TTE 12/9/2018 that was normal.     EP Visit 1/2021: Patient presents today for follow up. He is feeling well. Over the past year he got motivated to become more healthy--he started exercising and paying attention to his diet, ended up losing 20 lbs. He has not gone into atrial fibrillation over the past year. Has been working from home, trying to avoid getting corona virus.    EP Visit 12/15/21: He presents today for follow up. He reports feeling well. He denies chest discomfort, palpitations, abdominal fullness/bloating or peripheral edema, shortness of breath, paroxysmal nocturnal dyspnea, orthopnea, lightheadedness, dizziness, pre-syncope, or syncope. Presenting 12 lead ECG shows SB Vent Rate 58 bpm,  ms,  ms, QTc 416 ms. Current cardiac medications include: Propafenone and ASA.     He presents today for follow up. He reports feeling well. He had one episode of AF that he took propafenone for and converted. Otherwise, no further episodes. He denies chest discomfort, palpitations, abdominal fullness/bloating or peripheral edema, shortness of breath, paroxysmal nocturnal dyspnea, orthopnea, lightheadedness, dizziness, pre-syncope, or syncope. Presenting 12 lead ECG shows SB Vent Rate 57 bpm,  ms, QRS 98 ms, QTc 412 ms. Current cardiac medications  include: Propafenone .       I have reviewed and updated the patient's Past Medical History, Social History, Family History and Medication List.     Cardiographics (Personally Reviewed) :   12/3/18 Echo:   Interpretation Summary  Global and regional left ventricular function is normal with an EF of 60-65%.  Right ventricular function, chamber size, wall motion, and thickness are  normal.  Both atria appear normal.  Dilation of the inferior vena cava is present with normal respiratory  variation in diameter.  No pericardial effusion is present.  LV function has normalized and patient in sinus rhythm.    4/18/19 NM Stress Test:   Impression:  1. Normal myocardial SPECT perfusion study without any evidence of  ischemia or infarct.  2. Normal left ventricular ejection fraction/myocardial contractility.  3. Two lung nodules in the right lung smaller than 4 mm, favored as  Benign.    11/2015 CMR:  1. The left ventricle is mildly dilated. The wall thickness is normal.  The global systolic function is mildly reduced. The quantitative LV  ejection fraction is 53%. There is mild global hypokinesis.     LV volumes absolute and indexed to BSA with age and gender-matched  normal values in parentheses are listed below:     ED volume: 223 ml (101 - 236 ml)  ED volume index: 104 ml/m2 (52 - 112 ml/m2)  ES volume: 105 ml (28 - 93 ml)  ES volume index: 49 ml/m2     2. The right ventricle is mildly dilated in cavity size with mildly  reduced global systolic function. The quantitative RV ejection  fraction is 51%.     RV volumes absolute and indexed to BSA with age and gender-matched  normal values in parentheses are listed below:     ED volume: 274 ml (110 - 243 ml)  ED volume index: 128 ml/m2 (58 - 115 ml/m2)  ES volume: 134 ml (46 - 112 ml)  ES volume index: 63 ml/m2     3. There is moderate biatrial enlargement.     4. The aortic valve is trileaflet in morphology. There is no  significant aortic valve stenosis or regurgitation.       5. There is no other significant valvular disease.      6. Delayed enhancement imaging demonstrates no evidence of myocardial  infarction, fibrosis or infiltrative disease. This is consistent with  a non-ischemic cardiomyopathy.    IMPRESSION:  Mild non-ischemic cardiomyopathy with LVEF of 53%.       Physical Examination   /83 (BP Location: Right arm, Patient Position: Chair, Cuff Size: Adult Large)   Pulse 56   Wt 98.7 kg (217 lb 8 oz)   SpO2 99%   BMI 27.19 kg/m    Wt Readings from Last 3 Encounters:   10/10/23 98.5 kg (217 lb 1.6 oz)   08/10/22 95.3 kg (210 lb)   08/04/22 97.1 kg (214 lb)     General Appearance:   Alert, well-appearing and in no acute distress.   HEENT: Atraumatic, normocephalic. PERRL.  MMM.   Chest/Lungs:   Respirations unlabored.  Lungs are clear to auscultation.   Cardiovascular:   Regular rate and rhythm.  S1/S2. No murmur.    Abdomen:  Soft, nontender, nondistended.   Extremities: No cyanosis or clubbing. No edema.    Musculoskeletal: Moves all extremities.  Gait normal.   Skin: Warm, dry, intact.    Neurologic: Mood and affect are appropriate.  Alert and oriented to person, place, time, and situation.          Medications  Allergies   Current Outpatient Medications   Medication Sig Dispense Refill    propafenone (RYTHMOL) 300 MG tablet Take 2 tablets (600 mg) by mouth once as needed For recurrence of atrial fibrillation. One dose per 24 hours as needed (Patient not taking: Reported on 10/10/2023) 20 tablet 0    rivaroxaban ANTICOAGULANT (XARELTO) 20 MG TABS tablet Take 1 tablet (20 mg) by mouth daily (with dinner) (Patient not taking: Reported on 10/10/2023) 30 tablet 1    Allergies   Allergen Reactions    Dairy Products [Milk Protein] GI Disturbance         Lab Results (Personally Reviewed)    Chemistry/lipid CBC Cardiac Enzymes/BNP/TSH/INR   Lab Results   Component Value Date    BUN 15.8 10/10/2023     10/10/2023    CO2 28 10/10/2023     Creatinine   Date Value Ref  Range Status   10/10/2023 1.01 0.67 - 1.17 mg/dL Final   04/18/2019 1.03 0.66 - 1.25 mg/dL Final       Lab Results   Component Value Date    CHOL 165 10/10/2023    HDL 39 (L) 10/10/2023     (H) 10/10/2023      Lab Results   Component Value Date    WBC 5.5 10/10/2023    HGB 15.6 10/10/2023    HCT 45.9 10/10/2023    MCV 90 10/10/2023     10/10/2023    Lab Results   Component Value Date    TSH 1.17 05/13/2016    INR 0.96 11/20/2017        The patient states understanding and is agreeable with the plan.   ARIANE Whitfield CNP  Electrophysiology Consult Service  Pager: [phone]                  Quality 47: Advance Care Plan: Advance Care Planning discussed and documented; advance care plan or surrogate decision maker documented in the medical record.

## 2023-12-20 NOTE — NURSING NOTE
Chief Complaint   Patient presents with    Follow Up     2 yr follow-up paroxysmal atrial fibrillation on propafenone pill-in-pocket.       Vitals were taken, medications reconciled, and EKG was performed.    Cullen Shelton, ZIYAD  8:03 AM

## 2023-12-28 ENCOUNTER — MYC MEDICAL ADVICE (OUTPATIENT)
Dept: INTERNAL MEDICINE | Facility: CLINIC | Age: 53
End: 2023-12-28
Payer: COMMERCIAL

## 2024-05-16 ENCOUNTER — ANCILLARY PROCEDURE (OUTPATIENT)
Dept: GENERAL RADIOLOGY | Facility: CLINIC | Age: 54
End: 2024-05-16
Attending: PODIATRIST
Payer: COMMERCIAL

## 2024-05-16 ENCOUNTER — OFFICE VISIT (OUTPATIENT)
Dept: PODIATRY | Facility: CLINIC | Age: 54
End: 2024-05-16
Payer: COMMERCIAL

## 2024-05-16 DIAGNOSIS — G62.9 PERIPHERAL NEURITIS: ICD-10-CM

## 2024-05-16 DIAGNOSIS — M79.672 FOOT PAIN, LEFT: Primary | ICD-10-CM

## 2024-05-16 PROCEDURE — 73630 X-RAY EXAM OF FOOT: CPT | Mod: LT | Performed by: RADIOLOGY

## 2024-05-16 PROCEDURE — 99203 OFFICE O/P NEW LOW 30 MIN: CPT | Performed by: PODIATRIST

## 2024-05-16 NOTE — LETTER
5/16/2024         RE: Dimitris Carroll  [address]  [place] [address]        Dear Colleague,    Thank you for referring your patient, Dimitris Carroll, to the [facility]. Please see a copy of my visit note below.    Past Medical History:   Diagnosis Date     Allergic state      Atrial fibrillation (H)      Chest pain 4/18/2019     Sleep apnea      Patient Active Problem List   Diagnosis     MILAGROS (obstructive sleep apnea)- moderate (AHI 25)     Chronic insomnia     Atrial fibrillation (H)     Past Surgical History:   Procedure Laterality Date     ANESTHESIA CARDIOVERSION N/A 11/20/2017    Procedure: ANESTHESIA CARDIOVERSION;  Cardioversion ;  Surgeon: GENERIC ANESTHESIA PROVIDER;  Location: [facility] OR     COLONOSCOPY N/A 8/10/2022    Procedure: COLONOSCOPY;  Surgeon: Ragini Caruso MD;  Location:  GI     Social History     Socioeconomic History     Marital status:      Spouse name: Not on file     Number of children: Not on file     Years of education: Not on file     Highest education level: Not on file   Occupational History     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 Not Asked   Social History Narrative     Not on file     Social Determinants of Health     Financial Resource Strain: Low Risk  (10/10/2023)    Financial Resource Strain      Within the past 12 months, have you or your family members you live with been unable to get utilities (heat, electricity) when it was really needed?: No   Food Insecurity: Low Risk  (10/10/2023)    Food Insecurity      Within the past 12 months, did you worry that your food would run out before you got money to buy more?: No      Within  the past 12 months, did the food you bought just not last and you didn t have money to get more?: No   Transportation Needs: Low Risk  (10/10/2023)    Transportation Needs      Within the past 12 months, has lack of transportation kept you from medical appointments, getting your medicines, non-medical meetings or appointments, work, or from getting things that you need?: No   Physical Activity: Not on file   Stress: Not on file   Social Connections: Unknown (1/1/2022)    Received from [facility], [email or web address]  [facility]    Social Connections      Frequency of Communication with Friends and Family: Not on file   Interpersonal Safety: Not on file   Housing Stability: Low Risk  (10/10/2023)    Housing Stability      Do you have housing? : Yes      Are you worried about losing your housing?: No     Family History   Problem Relation Age of Onset     Prostate Cancer Brother        Lab Results   Component Value Date    A1C 5.6 10/10/2023             Subjective findings- 54-year-old presents for left foot pain.  Relates been present for 2+ years duration, relates no injuries, relates had neuromas 20+ years ago he thinks is in the same foot and had physical therapy for, relates hurts the ball the foot, relates pain comes and goes, relates it is an aching pain and is stinging pain and a sharp pain that shoots up the foot, relates has had spondylolysis in the back in high school.    Objective findings- DP and PT are 2 out of 4 left.  Has dorsal contracted digits 1 through 5 left.  Has pain on palpation of plantar 2 through 4 MPJs left foot.  There is no palpable click or shooting pain in the interspaces with pinch and squeeze test left foot.  There is no erythema, no drainage, no odor, no calor, no tendon voids, no pain or range of motion.  X-rays 3 views left foot reviewed with patient in clinic today with joint and cortical margins intact, plantar and calcaneal spurring,  subchondral sclerosis and irregularity with mild dorsal lipping the talonavicular joint, laterally deviated sesamoids, mild subchondral sclerosis of the first MPJ joint, splaying of the second and third toes, dorsally contracted digits, appears to be healed fracture left proximal fourth metatarsa noted.     Assessment and plan- Chronic Left foot pain with capsulitis and neuritis, Hammertoes, rule out Neuroma, rule out Sciatica and Radiculopathy contributing.  Diagnosis and treatment options discussed with the patient.  Patient is advised on stretching.  Spenco over-the-counter insoles advised and use discussed with the patient.  Metatarsal pads dispensed and use discussed with the patient.  Prescription for physical therapy and Voltaren gel given use discussed with the patient, he relates he may or may not do these.  Referral to sports medicine to help rule out radiculopathy or sciatica contributing to this.  X-rays ordered, reviewed and use discussed with the patient today.  Return to clinic and see me as needed.                              Moderate level of medical decision making.      Again, thank you for allowing me to participate in the care of your patient.        Sincerely,        Glen Melchor DPM

## 2024-05-16 NOTE — PROGRESS NOTES
Past Medical History:   Diagnosis Date    Allergic state     Atrial fibrillation (H)     Chest pain 4/18/2019    Sleep apnea      Patient Active Problem List   Diagnosis    MILAGROS (obstructive sleep apnea)- moderate (AHI 25)    Chronic insomnia    Atrial fibrillation (H)     Past Surgical History:   Procedure Laterality Date    ANESTHESIA CARDIOVERSION N/A 11/20/2017    Procedure: ANESTHESIA CARDIOVERSION;  Cardioversion ;  Surgeon: GENERIC ANESTHESIA PROVIDER;  Location: [facility] OR    COLONOSCOPY N/A 8/10/2022    Procedure: COLONOSCOPY;  Surgeon: Ragini Caruso MD;  Location:  GI     Social History     Socioeconomic History    Marital status:      Spouse name: Not on file    Number of children: Not on file    Years of education: Not on file    Highest education level: Not on file   Occupational History    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 Not Asked   Social History Narrative    Not on file     Social Determinants of Health     Financial Resource Strain: Low Risk  (10/10/2023)    Financial Resource Strain     Within the past 12 months, have you or your family members you live with been unable to get utilities (heat, electricity) when it was really needed?: No   Food Insecurity: Low Risk  (10/10/2023)    Food Insecurity     Within the past 12 months, did you worry that your food would run out before you got money to buy more?: No     Within the past 12 months, did the food you bought just not last and you didn t have money to get more?: No   Transportation Needs: Low Risk  (10/10/2023)    Transportation Needs     Within the past 12 months, has lack of transportation kept you from medical appointments, getting your  medicines, non-medical meetings or appointments, work, or from getting things that you need?: No   Physical Activity: Not on file   Stress: Not on file   Social Connections: Unknown (1/1/2022)    [facility]    Social Connections     Frequency of Communication with Friends and Family: Not on file   Interpersonal Safety: Not on file   Housing Stability: Low Risk  (10/10/2023)    Housing Stability     Do you have housing? : Yes     Are you worried about losing your housing?: No     Family History   Problem Relation Age of Onset    Prostate Cancer Brother        Lab Results   Component Value Date    A1C 5.6 10/10/2023             Subjective findings- 54-year-old presents for left foot pain.  Relates been present for 2+ years duration, relates no injuries, relates had neuromas 20+ years ago he thinks is in the same foot and had physical therapy for, relates hurts the ball the foot, relates pain comes and goes, relates it is an aching pain and is stinging pain and a sharp pain that shoots up the foot, relates has had spondylolysis in the back in high school.    Objective findings- DP and PT are 2 out of 4 left.  Has dorsal contracted digits 1 through 5 left.  Has pain on palpation of plantar 2 through 4 MPJs left foot.  There is no palpable click or shooting pain in the interspaces with pinch and squeeze test left foot.  There is no erythema, no drainage, no odor, no calor, no tendon voids, no pain or range of motion.  X-rays 3 views left foot reviewed with patient in clinic today with joint and cortical margins intact, plantar and calcaneal spurring, subchondral sclerosis and irregularity with mild dorsal lipping the talonavicular joint, laterally deviated sesamoids, mild subchondral sclerosis of the first MPJ joint, splaying of the second and third toes, dorsally contracted digits, appears to be healed fracture left proximal  fourth metatarsa noted.     Assessment and plan- Chronic Left foot pain with capsulitis and neuritis, Hammertoes, rule out Neuroma, rule out Sciatica and Radiculopathy contributing.  Diagnosis and treatment options discussed with the patient.  Patient is advised on stretching.  Spenco over-the-counter insoles advised and use discussed with the patient.  Metatarsal pads dispensed and use discussed with the patient.  Prescription for physical therapy and Voltaren gel given use discussed with the patient, he relates he may or may not do these.  Referral to sports medicine to help rule out radiculopathy or sciatica contributing to this.  X-rays ordered, reviewed and use discussed with the patient today.  Return to clinic and see me as needed.                              Moderate level of medical decision making.

## 2024-05-16 NOTE — NURSING NOTE
Dimitris Carroll's chief complaint for this visit includes:  Chief Complaint   Patient presents with    Consult     Pain and numbness in the left foot     PCP: Lenin Bermudez    Referring Provider:  Referred Self, MD  No address on file    There were no vitals taken for this visit.  Data Unavailable     Do you need any medication refills at today's visit? NO    Allergies   Allergen Reactions    Dairy Products [Milk Protein] GI Disturbance       Charlette Churchill LPN

## 2024-06-04 ENCOUNTER — OFFICE VISIT (OUTPATIENT)
Dept: ORTHOPEDICS | Facility: CLINIC | Age: 54
End: 2024-06-04
Payer: COMMERCIAL

## 2024-06-04 DIAGNOSIS — M54.16 LUMBAR RADICULOPATHY: Primary | ICD-10-CM

## 2024-06-04 PROCEDURE — 99204 OFFICE O/P NEW MOD 45 MIN: CPT | Performed by: FAMILY MEDICINE

## 2024-06-04 ASSESSMENT — PAIN SCALES - GENERAL: PAINLEVEL: EXTREME PAIN (8)

## 2024-06-04 NOTE — NURSING NOTE
Chief Complaint   Patient presents with    Left Foot - Numbness, Pain, New Patient     Pain for 3-4 years       There were no vitals filed for this visit.    There is no height or weight on file to calculate BMI.      KATHY Monterroso NREMT                       Principal Discharge DX:	Dizziness   1

## 2024-06-04 NOTE — PROGRESS NOTES
HISTORY OF PRESENT ILLNESS  Mr. Carroll is a 54 year old male who presents to clinic today with foot pain.  Lobito has a history of pain in his left foot that has been present for the past couple of years.  No inciting event or injury that he can recall.  He experiences a sharp, aching, numb, and tingly pain in his foot.  This does not radiate up his leg.  He points to the dorsal aspect of his midfoot.    Of note, he was recently seen by Dr. Melchor as there was some concern that this may be a potential neuroma.  He does have a history of a neuroma over 20 years ago in this foot.  He was also seen by . She was seen in 2018 for sciatic symptoms in his left leg and foot.    Additional history: as documented    MEDICAL HISTORY  Patient Active Problem List   Diagnosis    MILAGROS (obstructive sleep apnea)- moderate (AHI 25)    Chronic insomnia    Atrial fibrillation (H)       Current Outpatient Medications   Medication Sig Dispense Refill    diclofenac (VOLTAREN) 1 % topical gel 1 gram to affected areas on left foot 2-3 times daily as needed for pain. 100 g 2    propafenone (RYTHMOL) 300 MG tablet Take 2 tablets (600 mg) by mouth once as needed For recurrence of atrial fibrillation. One dose per 24 hours as needed 20 tablet 3       Allergies   Allergen Reactions    Dairy Products [Milk Protein] GI Disturbance       Family History   Problem Relation Age of Onset    Prostate Cancer Brother        Additional medical/Social/Surgical histories reviewed in EPIC and updated as appropriate.        PHYSICAL EXAM  General  - normal appearance, in no obvious distress  Musculoskeletal - lumbar spine  - stance: normal gait without limp  - inspection: normal bone and joint alignment, no obvious scoliosis  - palpation: no paravertebral or bony tenderness  - ROM: normal flexion, extension, sidebending, rotation  - strength: lower extremities 5/5 in all planes  Neuro  - patellar and Achilles DTRs 2+ bilaterally, grossly normal  coordination, normal muscle tone             ASSESSMENT & PLAN  Mr. Carroll is a 54 year old male who presents to clinic today with left foot pain and history of lumbar radiculopathy.    I reviewed his previous x-ray in the room with him, this is from 2018 and does show severe disc space narrowing at L5-S1.    I had a good discussion with Lobito centering around likely etiologies for his symptoms.  I do agree with Dr. Melchor that his symptoms are most likely radicular in nature.  We did discuss working this up further with a potential MRI of the lumbar spine, which I do think would be warranted based upon his history.  We did also discuss that one of the treatment options may be a corticosteroid injection, or less likely, we could consider a surgical consult based upon the results.  Lobito is not interested in performing corticosteroid injections, which I absolutely understand.  Furthermore, he is not experiencing any myelopathic signs.    We revisited the notion of physical therapy, which I do think would provide him some refreshed core exercises that could help his issue quite a bit.  I am referring him to get started at his earliest convenience.    In the future we could consider MRI imaging if worsening or concerns.    It was a pleasure seeing Lobito today.    Herson Lopez DO, CAM  Primary Care Sports Medicine      This note was constructed using Dragon dictation software, please excuse any minor errors in spelling, grammar, or syntax.

## 2024-06-04 NOTE — LETTER
6/4/2024         RE: Dimitris Carroll  [address]  [facility] [address]        Dear Colleague,    Thank you for referring your patient, Dimitris Carroll, to the [facility] [place]. Please see a copy of my visit note below.        HISTORY OF PRESENT ILLNESS  Mr. Carroll is a 54 year old male who presents to clinic today with foot pain.  Lobito has a history of pain in his left foot that has been present for the past couple of years.  No inciting event or injury that he can recall.  He experiences a sharp, aching, numb, and tingly pain in his foot.  This does not radiate up his leg.  He points to the dorsal aspect of his midfoot.    Of note, he was recently seen by Dr. Melchor as there was some concern that this may be a potential neuroma.  He does have a history of a neuroma over 20 years ago in this foot.  He was also seen by . She was seen in 2018 for sciatic symptoms in his left leg and foot.    Additional history: as documented    MEDICAL HISTORY  Patient Active Problem List   Diagnosis     MILAGROS (obstructive sleep apnea)- moderate (AHI 25)     Chronic insomnia     Atrial fibrillation (H)       Current Outpatient Medications   Medication Sig Dispense Refill     diclofenac (VOLTAREN) 1 % topical gel 1 gram to affected areas on left foot 2-3 times daily as needed for pain. 100 g 2     propafenone (RYTHMOL) 300 MG tablet Take 2 tablets (600 mg) by mouth once as needed For recurrence of atrial fibrillation. One dose per 24 hours as needed 20 tablet 3       Allergies   Allergen Reactions     Dairy Products [Milk Protein] GI Disturbance       Family History   Problem Relation Age of Onset     Prostate Cancer Brother        Additional medical/Social/Surgical histories reviewed in [facility] and updated as appropriate.        PHYSICAL EXAM  General  - normal appearance, in no obvious distress  Musculoskeletal - lumbar spine  - stance: normal gait without limp  - inspection:  normal bone and joint alignment, no obvious scoliosis  - palpation: no paravertebral or bony tenderness  - ROM: normal flexion, extension, sidebending, rotation  - strength: lower extremities 5/5 in all planes  Neuro  - patellar and Achilles DTRs 2+ bilaterally, grossly normal coordination, normal muscle tone             ASSESSMENT & PLAN  Mr. Carroll is a 54 year old male who presents to clinic today with left foot pain and history of lumbar radiculopathy.    I reviewed his previous x-ray in the room with him, this is from 2018 and does show severe disc space narrowing at L5-S1.    I had a good discussion with Lobito centering around likely etiologies for his symptoms.  I do agree with Dr. Melchor that his symptoms are most likely radicular in nature.  We did discuss working this up further with a potential MRI of the lumbar spine, which I do think would be warranted based upon his history.  We did also discuss that one of the treatment options may be a corticosteroid injection, or less likely, we could consider a surgical consult based upon the results.  Lobito is not interested in performing corticosteroid injections, which I absolutely understand.  Furthermore, he is not experiencing any myelopathic signs.    We revisited the notion of physical therapy, which I do think would provide him some refreshed core exercises that could help his issue quite a bit.  I am referring him to get started at his earliest convenience.    In the future we could consider MRI imaging if worsening or concerns.    It was a pleasure seeing Lobito today.    Herson Lopez DO, [facility]  Primary Care Sports Medicine      This note was constructed using Dragon dictation software, please excuse any minor errors in spelling, grammar, or syntax.      Again, thank you for allowing me to participate in the care of your patient.        Sincerely,        Herson Lopez DO

## 2024-07-11 ENCOUNTER — MYC MEDICAL ADVICE (OUTPATIENT)
Dept: SLEEP MEDICINE | Facility: CLINIC | Age: 54
End: 2024-07-11
Payer: COMMERCIAL

## 2024-07-11 DIAGNOSIS — G47.33 OSA ON CPAP: Primary | ICD-10-CM

## 2024-07-12 NOTE — TELEPHONE ENCOUNTER
Patient seeking a supply renewal, LOV 8/4/22, follow up scheduled July 16th.  Order pended for provider consideration.

## 2024-11-20 ENCOUNTER — OFFICE VISIT (OUTPATIENT)
Dept: INTERNAL MEDICINE | Facility: CLINIC | Age: 54
End: 2024-11-20
Payer: COMMERCIAL

## 2024-11-20 ENCOUNTER — LAB (OUTPATIENT)
Dept: LAB | Facility: CLINIC | Age: 54
End: 2024-11-20
Payer: COMMERCIAL

## 2024-11-20 VITALS
RESPIRATION RATE: 18 BRPM | HEIGHT: 75 IN | OXYGEN SATURATION: 97 % | TEMPERATURE: 98.1 F | BODY MASS INDEX: 27.38 KG/M2 | SYSTOLIC BLOOD PRESSURE: 139 MMHG | HEART RATE: 54 BPM | WEIGHT: 220.2 LBS | DIASTOLIC BLOOD PRESSURE: 88 MMHG

## 2024-11-20 DIAGNOSIS — R19.7 DIARRHEA, UNSPECIFIED TYPE: ICD-10-CM

## 2024-11-20 DIAGNOSIS — E78.5 HYPERLIPIDEMIA, UNSPECIFIED HYPERLIPIDEMIA TYPE: ICD-10-CM

## 2024-11-20 DIAGNOSIS — Z78.9 HISTORY OF FOREIGN TRAVEL: Primary | ICD-10-CM

## 2024-11-20 DIAGNOSIS — R03.0 ELEVATED BP WITHOUT DIAGNOSIS OF HYPERTENSION: ICD-10-CM

## 2024-11-20 DIAGNOSIS — N28.9 RENAL INSUFFICIENCY: ICD-10-CM

## 2024-11-20 LAB
ALBUMIN SERPL BCG-MCNC: 4.4 G/DL (ref 3.5–5.2)
ALP SERPL-CCNC: 90 U/L (ref 40–150)
ALT SERPL W P-5'-P-CCNC: 26 U/L (ref 0–70)
ANION GAP SERPL CALCULATED.3IONS-SCNC: 8 MMOL/L (ref 7–15)
AST SERPL W P-5'-P-CCNC: 21 U/L (ref 0–45)
BASOPHILS # BLD AUTO: 0 10E3/UL (ref 0–0.2)
BASOPHILS NFR BLD AUTO: 1 %
BILIRUB SERPL-MCNC: 0.6 MG/DL
BUN SERPL-MCNC: 17 MG/DL (ref 6–20)
CALCIUM SERPL-MCNC: 9.4 MG/DL (ref 8.8–10.4)
CHLORIDE SERPL-SCNC: 105 MMOL/L (ref 98–107)
CHOLEST SERPL-MCNC: 149 MG/DL
CREAT SERPL-MCNC: 1.22 MG/DL (ref 0.67–1.17)
CRP SERPL-MCNC: <3 MG/L
EGFRCR SERPLBLD CKD-EPI 2021: 70 ML/MIN/1.73M2
EOSINOPHIL # BLD AUTO: 0.1 10E3/UL (ref 0–0.7)
EOSINOPHIL NFR BLD AUTO: 2 %
ERYTHROCYTE [DISTWIDTH] IN BLOOD BY AUTOMATED COUNT: 12.7 % (ref 10–15)
ERYTHROCYTE [SEDIMENTATION RATE] IN BLOOD BY WESTERGREN METHOD: 9 MM/HR (ref 0–20)
FASTING STATUS PATIENT QL REPORTED: NO
FASTING STATUS PATIENT QL REPORTED: NO
GLUCOSE SERPL-MCNC: 100 MG/DL (ref 70–99)
HCO3 SERPL-SCNC: 28 MMOL/L (ref 22–29)
HCT VFR BLD AUTO: 46.4 % (ref 40–53)
HDLC SERPL-MCNC: 35 MG/DL
HGB BLD-MCNC: 15.9 G/DL (ref 13.3–17.7)
IMM GRANULOCYTES # BLD: 0 10E3/UL
IMM GRANULOCYTES NFR BLD: 0 %
LDLC SERPL CALC-MCNC: 94 MG/DL
LYMPHOCYTES # BLD AUTO: 1.9 10E3/UL (ref 0.8–5.3)
LYMPHOCYTES NFR BLD AUTO: 34 %
MCH RBC QN AUTO: 30.9 PG (ref 26.5–33)
MCHC RBC AUTO-ENTMCNC: 34.3 G/DL (ref 31.5–36.5)
MCV RBC AUTO: 90 FL (ref 78–100)
MONOCYTES # BLD AUTO: 0.5 10E3/UL (ref 0–1.3)
MONOCYTES NFR BLD AUTO: 9 %
NEUTROPHILS # BLD AUTO: 3.1 10E3/UL (ref 1.6–8.3)
NEUTROPHILS NFR BLD AUTO: 54 %
NONHDLC SERPL-MCNC: 114 MG/DL
NRBC # BLD AUTO: 0 10E3/UL
NRBC BLD AUTO-RTO: 0 /100
PLATELET # BLD AUTO: 276 10E3/UL (ref 150–450)
POTASSIUM SERPL-SCNC: 5 MMOL/L (ref 3.4–5.3)
PROT SERPL-MCNC: 7.6 G/DL (ref 6.4–8.3)
RBC # BLD AUTO: 5.14 10E6/UL (ref 4.4–5.9)
SODIUM SERPL-SCNC: 141 MMOL/L (ref 135–145)
TRIGL SERPL-MCNC: 101 MG/DL
TSH SERPL DL<=0.005 MIU/L-ACNC: 1.77 UIU/ML (ref 0.3–4.2)
WBC # BLD AUTO: 5.7 10E3/UL (ref 4–11)

## 2024-11-20 PROCEDURE — 84443 ASSAY THYROID STIM HORMONE: CPT | Performed by: PATHOLOGY

## 2024-11-20 PROCEDURE — 80053 COMPREHEN METABOLIC PANEL: CPT | Performed by: PATHOLOGY

## 2024-11-20 PROCEDURE — 36415 COLL VENOUS BLD VENIPUNCTURE: CPT | Performed by: PATHOLOGY

## 2024-11-20 PROCEDURE — 85025 COMPLETE CBC W/AUTO DIFF WBC: CPT | Performed by: PATHOLOGY

## 2024-11-20 PROCEDURE — 80061 LIPID PANEL: CPT | Performed by: PATHOLOGY

## 2024-11-20 PROCEDURE — 86140 C-REACTIVE PROTEIN: CPT | Performed by: PATHOLOGY

## 2024-11-20 PROCEDURE — 85652 RBC SED RATE AUTOMATED: CPT | Performed by: PATHOLOGY

## 2024-11-20 RX ORDER — AZITHROMYCIN 500 MG/1
TABLET, FILM COATED ORAL
Qty: 6 TABLET | Refills: 0 | Status: SHIPPED | OUTPATIENT
Start: 2024-11-20

## 2024-11-20 RX ORDER — ATOVAQUONE AND PROGUANIL HYDROCHLORIDE 250; 100 MG/1; MG/1
TABLET, FILM COATED ORAL
Qty: 24 TABLET | Refills: 0 | Status: SHIPPED | OUTPATIENT
Start: 2024-11-20

## 2024-11-20 NOTE — PROGRESS NOTES
Lobito is a 54 year old that presents in clinic today for the following:     Chief Complaint   Patient presents with    Follow Up     Pt here for stomach issues. Pt is traveling on Jan. 2nd 2025 to [facility]    Diarrhea           11/20/2024     9:23 AM   Additional Questions   Roomed by Lucille PEREIRA   Accompanied by N/A     Screenings from encounters over the past 10 days    No data recorded       Lucilel Rodrigez at 9:29 AM on 11/20/2024

## 2024-11-20 NOTE — PROGRESS NOTES
"HPI  54-year-old here today for several issues.  He has had some intermittent episodes of diarrhea.  Is only occurring about once a month he has not been able to related to anything that he eats or anything in his diet although he knows that he is sensitive to lactose and dairy.  He has no problems in between episodes.  No issues or discomfort.  He is planning upcoming trip to [facility].  [place] endemic [place].  We reviewed the CDC website and will update his vaccines accordingly.  Past Medical History:   Diagnosis Date    Allergic state     Atrial fibrillation (H)     Chest pain 4/18/2019    Sleep apnea      Past Surgical History:   Procedure Laterality Date    ANESTHESIA CARDIOVERSION N/A 11/20/2017    Procedure: ANESTHESIA CARDIOVERSION;  Cardioversion ;  Surgeon: GENERIC ANESTHESIA PROVIDER;  Location: [facility] OR    COLONOSCOPY N/A 8/10/2022    Procedure: COLONOSCOPY;  Surgeon: Ragini Caruso MD;  Location:  GI     Family History   Problem Relation Age of Onset    Prostate Cancer Brother          Exam:  /88 (BP Location: Right arm, Patient Position: Sitting, Cuff Size: Adult Regular)   Pulse 54   Temp 98.1  F (36.7  C)   Resp 18   Ht 1.905 m (6' 3\")   Wt 99.9 kg (220 lb 3.2 oz)   SpO2 97%   BMI 27.52 kg/m    220 lbs 3.2 oz  The patient is alert, oriented with a clear sensorium.   Skin shows no lesions or rashes and good turgor.   Head is normocephalic and atraumatic.    Neck shows no nodes, thyromegaly.     Lungs are clear.   Heart shows normal S1 and S2 without murmur or gallop.    Abdomen is soft and not    Labs reviewed:  Results for orders placed or performed in visit on 11/20/24   TSH with free T4 reflex     Status: Normal   Result Value Ref Range    TSH 1.77 0.30 - 4.20 uIU/mL   Comprehensive metabolic panel     Status: Abnormal   Result Value Ref Range    Sodium 141 135 - 145 mmol/L    Potassium 5.0 3.4 - 5.3 mmol/L    Carbon Dioxide (CO2) 28 " 22 - 29 mmol/L    Anion Gap 8 7 - 15 mmol/L    Urea Nitrogen 17.0 6.0 - 20.0 mg/dL    Creatinine 1.22 (H) 0.67 - 1.17 mg/dL    GFR Estimate 70 >60 mL/min/1.73m2    Calcium 9.4 8.8 - 10.4 mg/dL    Chloride 105 98 - 107 mmol/L    Glucose 100 (H) 70 - 99 mg/dL    Alkaline Phosphatase 90 40 - 150 U/L    AST 21 0 - 45 U/L    ALT 26 0 - 70 U/L    Protein Total 7.6 6.4 - 8.3 g/dL    Albumin 4.4 3.5 - 5.2 g/dL    Bilirubin Total 0.6 <=1.2 mg/dL    Patient Fasting > 8hrs? No    CRP inflammation     Status: Normal   Result Value Ref Range    CRP Inflammation <3.00 <5.00 mg/L   Erythrocyte sedimentation rate auto     Status: Normal   Result Value Ref Range    Erythrocyte Sedimentation Rate 9 0 - 20 mm/hr   Lipid Profile     Status: Abnormal   Result Value Ref Range    Cholesterol 149 <200 mg/dL    Triglycerides 101 <150 mg/dL    Direct Measure HDL 35 (L) >=40 mg/dL    LDL Cholesterol Calculated 94 <100 mg/dL    Non HDL Cholesterol 114 <130 mg/dL    Patient Fasting > 8hrs? No     Narrative    Cholesterol  Desirable: < 200 mg/dL  Borderline High: 200 - 239 mg/dL  High: >= 240 mg/dL    Triglycerides  Normal: < 150 mg/dL  Borderline High: 150 - 199 mg/dL  High: 200-499 mg/dL  Very High: >= 500 mg/dL    Direct Measure HDL  Female: >= 50 mg/dL   Male: >= 40 mg/dL    LDL Cholesterol  Desirable: < 100 mg/dL  Above Desirable: 100 - 129 mg/dL   Borderline High: 130 - 159 mg/dL   High:  160 - 189 mg/dL   Very High: >= 190 mg/dL    Non HDL Cholesterol  Desirable: < 130 mg/dL  Above Desirable: 130 - 159 mg/dL  Borderline High: 160 - 189 mg/dL  High: 190 - 219 mg/dL  Very High: >= 220 mg/dL   CBC with platelets and differential     Status: None   Result Value Ref Range    WBC Count 5.7 4.0 - 11.0 10e3/uL    RBC Count 5.14 4.40 - 5.90 10e6/uL    Hemoglobin 15.9 13.3 - 17.7 g/dL    Hematocrit 46.4 40.0 - 53.0 %    MCV 90 78 - 100 fL    MCH 30.9 26.5 - 33.0 pg    MCHC 34.3 31.5 - 36.5 g/dL    RDW 12.7 10.0 - 15.0 %    Platelet Count 276 150 -  450 10e3/uL    % Neutrophils 54 %    % Lymphocytes 34 %    % Monocytes 9 %    % Eosinophils 2 %    % Basophils 1 %    % Immature Granulocytes 0 %    NRBCs per 100 WBC 0 <1 /100    Absolute Neutrophils 3.1 1.6 - 8.3 10e3/uL    Absolute Lymphocytes 1.9 0.8 - 5.3 10e3/uL    Absolute Monocytes 0.5 0.0 - 1.3 10e3/uL    Absolute Eosinophils 0.1 0.0 - 0.7 10e3/uL    Absolute Basophils 0.0 0.0 - 0.2 10e3/uL    Absolute Immature Granulocytes 0.0 <=0.4 10e3/uL    Absolute NRBCs 0.0 10e3/uL   CBC with Platelets & Differential     Status: None    Narrative    The following orders were created for panel order CBC with Platelets & Differential.  Procedure                               Abnormality         Status                     ---------                               -----------         ------                     CBC with platelets and d...[[id number]]                      Final result                 Please view results for these tests on the individual orders.         ASSESSMENT  1 plan foreign travel to [facility]  2 intermittent infrequent diarrhea likely dietary  3 MILAGROS on CPAP  4 Paroxysmal Atrial fib  5 IGT  6 renal insufficiency needs follow-up  7 family history colon cancer brother    Plan  Will give him azithromycin to have on hand for traveler's diarrhea and will use Malarone for malaria prophylaxis.  These prescriptions were sent will update his immunizations now with the Twinrix and will give him his COVID-vaccine today.  He will follow-up for flu shot typhoid in the second Twinrix before his trip to [facility] in January      Over 30 minutes spent on the day of service in chart review, patient contact, record completion and review and notification of lab reports    This note was completed using Dragon voice recognition software.      Doug Figueredo MD  General Internal Medicine  Primary Care Center  [phone]

## 2024-12-04 ENCOUNTER — ALLIED HEALTH/NURSE VISIT (OUTPATIENT)
Dept: INTERNAL MEDICINE | Facility: CLINIC | Age: 54
End: 2024-12-04
Payer: COMMERCIAL

## 2024-12-04 DIAGNOSIS — Z23 NEED FOR VACCINATION: Primary | ICD-10-CM

## 2024-12-04 NOTE — PROGRESS NOTES
Dimitris Carroll received the Influenza and Typhoid vaccines today in clinic at the request of Dr. Figueredo. The immunization was given under the supervision of Dr. Figueredo if assistance was needed. The patient does not report a history of adverse reactions associated with vaccine administration. The immunization site was cleaned with an alcohol prep wipe. The immunization was given without incident--see immunization list for administration details. No swelling or redness was observed at the site of injection after the immunization was given. The patient was advised to remain in fourth floor lobby of the [facility] for fifteen minutes after the injection in case of an adverse reaction.     Maritza Hobbs, EMT at 9:20 AM on 12/4/2024

## 2024-12-06 ENCOUNTER — TRANSFERRED RECORDS (OUTPATIENT)
Dept: HEALTH INFORMATION MANAGEMENT | Facility: CLINIC | Age: 54
End: 2024-12-06

## 2024-12-17 ENCOUNTER — MYC MEDICAL ADVICE (OUTPATIENT)
Dept: PODIATRY | Facility: CLINIC | Age: 54
End: 2024-12-17
Payer: COMMERCIAL

## 2024-12-17 ENCOUNTER — MYC MEDICAL ADVICE (OUTPATIENT)
Dept: SLEEP MEDICINE | Facility: CLINIC | Age: 54
End: 2024-12-17
Payer: COMMERCIAL

## 2024-12-18 ENCOUNTER — PATIENT OUTREACH (OUTPATIENT)
Dept: ONCOLOGY | Facility: CLINIC | Age: 54
End: 2024-12-18
Payer: COMMERCIAL

## 2024-12-18 NOTE — PROGRESS NOTES
New Patient Oncology Nurse Navigator Note     Referring provider: Doug Figueredo MD      Referring Clinic/Organization: [facility]      Referred to (specialty:) Cancer Surgery     Requested provider (if applicable): NA     Date Referral Received: December 18, 2024     Evaluation for:  C43.9 (ICD-10-CM) - Melanoma of skin (H)      Appointment with [facility]  on 12/19 at 12 PM.     Payor: BCBS / Plan: [place] / Product Type: Indemnity /     December 18, 2024  Referral received and reviewed.   Called patient this morning to discuss referral.   At this time patient does not want to move forward with this as they are set up with follow up with [facility]  tomorrow 12/19/24.    Bing BERGMANN, RN, OCN  Oncology Nurse Navigator   [facility]  Cancer Care Service Line   New Patient Hem/Onc Scheduling / Referrals: [phone] (fax: [phone] )

## 2024-12-19 ENCOUNTER — TRANSFERRED RECORDS (OUTPATIENT)
Dept: HEALTH INFORMATION MANAGEMENT | Facility: CLINIC | Age: 54
End: 2024-12-19

## 2024-12-29 ENCOUNTER — HEALTH MAINTENANCE LETTER (OUTPATIENT)
Age: 54
End: 2024-12-29

## 2025-01-03 ENCOUNTER — TRANSFERRED RECORDS (OUTPATIENT)
Dept: HEALTH INFORMATION MANAGEMENT | Facility: CLINIC | Age: 55
End: 2025-01-03

## 2025-01-14 ENCOUNTER — TELEPHONE (OUTPATIENT)
Dept: DERMATOLOGY | Facility: CLINIC | Age: 55
End: 2025-01-14
Payer: COMMERCIAL

## 2025-01-14 NOTE — TELEPHONE ENCOUNTER
Called and spoke with pt regarding referral for NEW DERM appt following hx of Melanoma.     Pt is due for skin checks every 3 mo. Treated melanoma with an outside office in Dec 2024.     There are no NEW DERM appts available until 08/01. We discuss pt starting with a 3 mo with his previous derm and then seeing us for the next 3 mo appt or just beginning with the 08/01 appt at [facility] and going on the wait list. Pt asked to schedule for 08/01 and be wait listed for all locations.     Rosi Ontiveros, Complex  1/14/2025 11:06 AM

## 2025-03-24 ENCOUNTER — OFFICE VISIT (OUTPATIENT)
Dept: INTERNAL MEDICINE | Facility: CLINIC | Age: 55
End: 2025-03-24
Payer: COMMERCIAL

## 2025-03-24 ENCOUNTER — LAB (OUTPATIENT)
Dept: LAB | Facility: CLINIC | Age: 55
End: 2025-03-24
Payer: COMMERCIAL

## 2025-03-24 VITALS
HEIGHT: 75 IN | OXYGEN SATURATION: 98 % | TEMPERATURE: 98.1 F | RESPIRATION RATE: 14 BRPM | WEIGHT: 221.4 LBS | DIASTOLIC BLOOD PRESSURE: 85 MMHG | SYSTOLIC BLOOD PRESSURE: 138 MMHG | BODY MASS INDEX: 27.53 KG/M2 | HEART RATE: 64 BPM

## 2025-03-24 DIAGNOSIS — C43.9 MELANOMA OF SKIN (H): Primary | ICD-10-CM

## 2025-03-24 DIAGNOSIS — N28.9 RENAL INSUFFICIENCY: ICD-10-CM

## 2025-03-24 DIAGNOSIS — C43.9 MELANOMA OF SKIN (H): ICD-10-CM

## 2025-03-24 DIAGNOSIS — R73.02 IGT (IMPAIRED GLUCOSE TOLERANCE): ICD-10-CM

## 2025-03-24 LAB
ALBUMIN SERPL BCG-MCNC: 4.1 G/DL (ref 3.5–5.2)
ALP SERPL-CCNC: 91 U/L (ref 40–150)
ALT SERPL W P-5'-P-CCNC: 23 U/L (ref 0–70)
ANION GAP SERPL CALCULATED.3IONS-SCNC: 9 MMOL/L (ref 7–15)
AST SERPL W P-5'-P-CCNC: 19 U/L (ref 0–45)
BILIRUB SERPL-MCNC: 0.5 MG/DL
BUN SERPL-MCNC: 14.4 MG/DL (ref 6–20)
CALCIUM SERPL-MCNC: 9.1 MG/DL (ref 8.8–10.4)
CHLORIDE SERPL-SCNC: 107 MMOL/L (ref 98–107)
CREAT SERPL-MCNC: 1.02 MG/DL (ref 0.67–1.17)
CRP SERPL-MCNC: <3 MG/L
EGFRCR SERPLBLD CKD-EPI 2021: 86 ML/MIN/1.73M2
ERYTHROCYTE [SEDIMENTATION RATE] IN BLOOD BY WESTERGREN METHOD: 9 MM/HR (ref 0–20)
EST. AVERAGE GLUCOSE BLD GHB EST-MCNC: 111 MG/DL
GLUCOSE SERPL-MCNC: 111 MG/DL (ref 70–99)
HBA1C MFR BLD: 5.5 %
HCO3 SERPL-SCNC: 25 MMOL/L (ref 22–29)
HOLD SPECIMEN: NORMAL
POTASSIUM SERPL-SCNC: 4.3 MMOL/L (ref 3.4–5.3)
PROT SERPL-MCNC: 6.9 G/DL (ref 6.4–8.3)
SODIUM SERPL-SCNC: 141 MMOL/L (ref 135–145)

## 2025-03-24 PROCEDURE — 85652 RBC SED RATE AUTOMATED: CPT | Performed by: PATHOLOGY

## 2025-03-24 PROCEDURE — 99214 OFFICE O/P EST MOD 30 MIN: CPT | Performed by: INTERNAL MEDICINE

## 2025-03-24 PROCEDURE — 3075F SYST BP GE 130 - 139MM HG: CPT | Performed by: INTERNAL MEDICINE

## 2025-03-24 PROCEDURE — 86140 C-REACTIVE PROTEIN: CPT | Performed by: PATHOLOGY

## 2025-03-24 PROCEDURE — 3079F DIAST BP 80-89 MM HG: CPT | Performed by: INTERNAL MEDICINE

## 2025-03-24 PROCEDURE — 36415 COLL VENOUS BLD VENIPUNCTURE: CPT | Performed by: PATHOLOGY

## 2025-03-24 PROCEDURE — 83036 HEMOGLOBIN GLYCOSYLATED A1C: CPT | Performed by: INTERNAL MEDICINE

## 2025-03-24 PROCEDURE — 80053 COMPREHEN METABOLIC PANEL: CPT | Performed by: PATHOLOGY

## 2025-03-24 PROCEDURE — 99000 SPECIMEN HANDLING OFFICE-LAB: CPT | Performed by: PATHOLOGY

## 2025-03-24 NOTE — PROGRESS NOTES
"HPI  55-year-old presents today for several issues.  He was recently diagnosed with melanoma this was apparently completely excised and low level of invasion however we do not have the path report on that.  He is planning to follow-up with oncology regarding this he is requesting CT or PET scan today.  We discussed that CT scan would likely only be indicated with significant invasion however both he and his wife have concerns and for mental health issues we will go ahead and get the CT ordered.  He has a couple of other concerns.  He has a trigger finger in his left fourth finger which is intermittently symptomatic.  We discussed treatment options including injection versus splinting he elected to go with splinting.  He has had a callus on his left third toe he has also had history of Burgess's neuroma and some intermittent pain numbness and tingling here.  His wife is concerned that he has less hair on his lower legs.  He is exercising regularly doing a combination of fat tire biking rowing and has no leg symptoms pain discomfort or exertional issues in association with this.  Past Medical History:   Diagnosis Date    Allergic state     Atrial fibrillation (H)     Chest pain 4/18/2019    Sleep apnea      Past Surgical History:   Procedure Laterality Date    ANESTHESIA CARDIOVERSION N/A 11/20/2017    Procedure: ANESTHESIA CARDIOVERSION;  Cardioversion ;  Surgeon: GENERIC ANESTHESIA PROVIDER;  Location: [facility] OR    COLONOSCOPY N/A 8/10/2022    Procedure: COLONOSCOPY;  Surgeon: Ragini Caruso MD;  Location:  GI     Family History   Problem Relation Age of Onset    Prostate Cancer Brother          Exam:  /85 (BP Location: Right arm, Patient Position: Sitting, Cuff Size: Adult Large)   Pulse 64   Temp 98.1  F (36.7  C) (Oral)   Resp 14   Ht 1.905 m (6' 3\")   Wt 100.4 kg (221 lb 6.4 oz)   SpO2 98%   BMI 27.67 kg/m    221 lbs 6.4 oz  The patient is alert, oriented with a clear sensorium.   Skin shows no " lesions or rashes and good turgor.   Head is normocephalic and atraumatic.    Extremities show no edema.  Strong dorsalis pedis pulses and posterior tibial pulses bilaterally.  Callus noted on the distal left third toe with tenderness in the interdigital space between the second and third toes.  He has triggering of the left fourth finger    Labs reviewed:  Results for orders placed or performed in visit on 03/24/25   Extra Tube     Status: None    Narrative    The following orders were created for panel order Extra Tube.  Procedure                               Abnormality         Status                     ---------                               -----------         ------                     Extra Purple Top Tube[[id number]]                           Final result                 Please view results for these tests on the individual orders.   Extra Purple Top Tube     Status: None   Result Value Ref Range    Hold Specimen [facility]    Comprehensive metabolic panel     Status: Abnormal   Result Value Ref Range    Sodium 141 135 - 145 mmol/L    Potassium 4.3 3.4 - 5.3 mmol/L    Carbon Dioxide (CO2) 25 22 - 29 mmol/L    Anion Gap 9 7 - 15 mmol/L    Urea Nitrogen 14.4 6.0 - 20.0 mg/dL    Creatinine 1.02 0.67 - 1.17 mg/dL    GFR Estimate 86 >60 mL/min/1.73m2    Calcium 9.1 8.8 - 10.4 mg/dL    Chloride 107 98 - 107 mmol/L    Glucose 111 (H) 70 - 99 mg/dL    Alkaline Phosphatase 91 40 - 150 U/L    AST 19 0 - 45 U/L    ALT 23 0 - 70 U/L    Protein Total 6.9 6.4 - 8.3 g/dL    Albumin 4.1 3.5 - 5.2 g/dL    Bilirubin Total 0.5 <=1.2 mg/dL   Erythrocyte sedimentation rate auto     Status: Normal   Result Value Ref Range    Erythrocyte Sedimentation Rate 9 0 - 20 mm/hr   CRP inflammation     Status: Normal   Result Value Ref Range    CRP Inflammation <3.00 <5.00 mg/L   Hemoglobin A1c     Status: Normal   Result Value Ref Range    Estimated Average Glucose 111 <117 mg/dL    Hemoglobin A1C 5.5 <5.7 %       ASSESSMENT  1 Melanoma  2  borderline hypertension  3 MILAGROS on CPAP  4 Paroxysmal Atrial fib  5 IGT  6 renal insufficiency needs follow-up  7 family history colon cancer brother  8 trigger finger  9 Burgess's neuroma left foot    Plan  I got him a finger splint for the trigger finger will have him wear this for a few weeks and see if it does not help with the triggering.  Will reassess his labs today in light of the melanoma and renal insufficiency.  Will also check his A1c and do CT scanning because of the history of melanoma    This note was completed using Dragon voice recognition software.      Doug Figueredo MD  General Internal Medicine  Primary Care Center  [phone]

## 2025-06-30 ENCOUNTER — OFFICE VISIT (OUTPATIENT)
Dept: DERMATOLOGY | Facility: CLINIC | Age: 55
End: 2025-06-30
Payer: COMMERCIAL

## 2025-06-30 DIAGNOSIS — C43.9 MELANOMA OF SKIN (H): ICD-10-CM

## 2025-06-30 DIAGNOSIS — D18.01 CHERRY ANGIOMA: ICD-10-CM

## 2025-06-30 DIAGNOSIS — D22.9 MULTIPLE NEVI: ICD-10-CM

## 2025-06-30 DIAGNOSIS — L82.1 SEBORRHEIC KERATOSES: ICD-10-CM

## 2025-06-30 DIAGNOSIS — D49.2 NEOPLASM OF UNSPECIFIED BEHAVIOR OF BONE, SOFT TISSUE, AND SKIN: ICD-10-CM

## 2025-06-30 DIAGNOSIS — Z12.83 ENCOUNTER FOR SCREENING FOR MALIGNANT NEOPLASM OF SKIN: Primary | ICD-10-CM

## 2025-06-30 DIAGNOSIS — L81.4 LENTIGINES: ICD-10-CM

## 2025-06-30 PROCEDURE — 88305 TISSUE EXAM BY PATHOLOGIST: CPT | Mod: TC | Performed by: PHYSICIAN ASSISTANT

## 2025-06-30 ASSESSMENT — PAIN SCALES - GENERAL: PAINLEVEL_OUTOF10: NO PAIN (0)

## 2025-06-30 NOTE — PATIENT INSTRUCTIONS
Patient Education        Proper skin care from [facility]:     -Eliminate harsh soaps as they strip the natural oils from the skin, often resulting in dry itchy skin ( i.e. Dial, Zest, [place] Spring)  -Use mild soaps such as Cetaphil or Dove Sensitive Skin in the shower. You do not need to use soap on arms, legs, and trunk every time you shower unless visibly soiled.   -Avoid hot or cold showers.  -After showering, lightly dry off and apply moisturizing within 2-3 minutes. This will help trap moisture in the skin.   -Aggressive use of a moisturizer at least 1-2 times a day to the entire body (including -Vanicream, Cetaphil, Aquaphor or Cerave) and moisturize hands after every washing.  -We recommend using moisturizers that come in a tub that needs to be scooped out, not a pump. This has more of an oil base. It will hold moisture in your skin much better than a water base moisturizer. The above recommended are non-pore clogging.        Wear a sunscreen with at least SPF 30 on your face, ears, neck and V of the chest daily. Wear sunscreen on other areas of the body if those areas are exposed to the sun throughout the day. Sunscreens can contain physical and/or chemical blockers. Physical blockers are less likely to clog pores, these include zinc oxide and titanium dioxide. Reapply every two hour and after swimming.      Sunscreen examples: https://www.ewg.org/sunscreen/     UV radiation  UVA radiation remains constant throughout the day and throughout the year. It is a longer wavelength than UVB and therefore penetrates deeper into the skin leading to immediate and delayed tanning, photoaging, and skin cancer. 70-80% of UVA and UVB radiation occurs between the hours of 10am-2pm.  UVB radiation  UVB radiation causes the most harmful effects and is more significant during the summer months. However, snow and ice can reflect UVB radiation leading to skin damage during the winter months as well. UVB radiation is  responsible for tanning, burning, inflammation, delayed erythema (pinkness), pigmentation (brown spots), and skin cancer.      I recommend self monthly full body exams and yearly full body exams with a dermatology provider. If you develop a new or changing lesion please follow up for examination. Most skin cancers are pink and scaly or pink and pearly. However, we do see blue/brown/black skin cancers.  Consider the ABCDEs of melanoma when giving yourself your monthly full body exam ( don't forget the groin, buttocks, feet, toes, etc). A-asymmetry, B-borders, C-color, D-diameter, E-elevation or evolving. If you see any of these changes please follow up in clinic. If you cannot see your back I recommend purchasing a hand held mirror to use with a larger wall mirror.       Checking for Skin Cancer  You can find cancer early by checking your skin each month. There are 3 kinds of skin cancer. They are melanoma, basal cell carcinoma, and squamous cell carcinoma. Doing monthly skin checks is the best way to find new marks or skin changes. Follow the instructions below for checking your skin.   The ABCDEs of checking moles for melanoma   Check your moles or growths for signs of melanoma using ABCDE:   Asymmetry: the sides of the mole or growth don t match  Border: the edges are ragged, notched, or blurred  Color: the color within the mole or growth varies  Diameter: the mole or growth is larger than 6 mm (size of a pencil eraser)  Evolving: the size, shape, or color of the mole or growth is changing (evolving is not shown in the images below)    Checking for other types of skin cancer  Basal cell carcinoma or squamous cell carcinoma have symptoms such as:      A spot or mole that looks different from all other marks on your skin  Changes in how an area feels, such as itching, tenderness, or pain  Changes in the skin's surface, such as oozing, bleeding, or scaliness  A sore that does not heal  New swelling or redness beyond  the border of a mole     Who s at risk?  Anyone can get skin cancer. But you are at greater risk if you have:   Fair skin, light-colored hair, or light-colored eyes  Many moles or abnormal moles on your skin  A history of sunburns from sunlight or tanning beds  A family history of skin cancer  A history of exposure to radiation or chemicals  A weakened immune system  If you have had skin cancer in the past, you are at risk for recurring skin cancer.   How to check your skin  Do your monthly skin checkups in front of a full-length mirror. Check all parts of your body, including your:   Head (ears, face, neck, and scalp)  Torso (front, back, and sides)  Arms (tops, undersides, upper, and lower armpits)  Hands (palms, backs, and fingers, including under the nails)  Buttocks and genitals  Legs (front, back, and sides)  Feet (tops, soles, toes, including under the nails, and between toes)  If you have a lot of moles, take digital photos of them each month. Make sure to take photos both up close and from a distance. These can help you see if any moles change over time.   Most skin changes are not cancer. But if you see any changes in your skin, call your doctor right away. Only he or she can diagnose a problem. If you have skin cancer, seeing your doctor can be the first step toward getting the treatment that could save your life.   [employer] last reviewed this educational content on 4/1/2019 2000-2020 [employer]. [address] [place] [address], [place] [address]. All rights reserved. This information is not intended as a substitute for professional medical care. Always follow your healthcare professional's instructions.        When should I call my doctor?  If you are worsening or not improving, please, contact us or seek urgent care as noted below.      Who should I call with questions (adults)?  [facility] (adult and pediatric): [phone]  [facility]  "[place] (adult): [phone]  [facility] ([place], [place], [place] and [place]) [phone]  For urgent needs outside of business hours call the [facility] at [phone] and ask for the dermatology resident on call to be paged  If this is a medical emergency and you are unable to reach an ER, Call 911        If you need a prescription refill, please contact your pharmacy. Refills are approved or denied by our Physicians during normal business hours, Monday through Fridays  Per office policy, refills will not be granted if you have not been seen within the past year (or sooner depending on your child's condition)       Post Biopsy Site Care for Shave Biopsy  Keep the bandaid on until tomorrow and keep the area clean and dry.  Wash with mild soap and water every day until wound appears well-healed. Rinse well and pat dry.  Apply Vaseline over site with a Q-tip and Re-apply a bandaid until wound appears well-healed.  A well-healed wound is \"pinked over\" and has a shiny appearance.    Call the clinic right away if you notice any signs or symptoms of infection, such as: warmth or redness at the site, fever over 100 degrees F, yellow/creamy or foul-smelling drainage, or pain at the site.   Biopsy results typically come back in about 1-2 weeks.  If you have not heard from us within 2 weeks call the clinic.  Please do not hesitate to call the clinic with any questions or concerns. We are here Mon-Fri from 8am-5pm and can be reached at [phone]      "

## 2025-06-30 NOTE — PROGRESS NOTES
[facility] Dermatology Note  Encounter Date: Jun 30, 2025  Office Visit     Reviewed patient's past medical history and pertinent chart review prior to patient's visit today.     Dermatology Problem List:  # NUB, right upper paraspinal back, shave biopsy 6/30/2025   # NUB, left 3rd toe, punch biopsy 6/30/2025     # Hx melanoma  - left superior medial upper back, breslow depth: 0.4 mm, s/p excision 12/19/2024 with [facility]     No known family history of skin cancer.    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 Shave biopsy today.    Procedure Note: Biopsy by shave technique  The risks and benefits of the procedure were described to the patient. These include but are not limited to bleeding, infection, scar, incomplete removal, and non-diagnostic biopsy. Verbal informed consent was obtained. The above site(s) was cleansed with an alcohol pad and injected with 1% lidocaine with epinephrine. Once anesthesia was obtained, a biopsy(ies) was performed with Gilette blade. The tissue(s) was placed in a labeled container(s) with formalin and sent to pathology. Hemostasis was achieved with aluminum chloride. Vaseline and a bandage were applied to the wound(s). The patient tolerated the procedure well and was given post biopsy care instructions.    # Neoplasm of uncertain behavior:  left 3rd toe  DDx includes corn vs wart vs SCC. Punch biopsy today.    Procedure Note: Biopsy by punch technique  The risks and benefits of the procedure were described to the patient. These include but are not limited to bleeding, infection, scar, incomplete removal, and non-diagnostic biopsy. Verbal informed consent was obtained. The left 3rd toe was cleansed with an alcohol pad and injected with lidocaine with  epinephrine. Once anesthesia was obtained, a 5 mm punch biopsy was performed. The tissue was placed in a labeled container with formalin and sent to pathology. Gel foam placed at the site. Vaseline and a bandage were applied to the wound. The patient tolerated the procedure well and was given post biopsy care instructions.     # Personal history of malignant melanoma  # Multiple nevi, trunk and extremities  # Solar lentigines  - No signs of recurrence. Continued observation recommended.   - Nevi demonstrate no concerning features on dermoscopy. We discussed the importance of self exams at home.   - ABCDEs: Counseled ABCDEs of melanoma: Asymmetry, Border (irregularity), Color (not uniform, changes in color), Diameter (greater than 6 mm which is about the size of a pencil eraser), and Evolving (any changes in preexisting moles).  - Sun protection: Counseled SPF 30+ sunscreen, UPF clothing, sun avoidance, tanning bed avoidance.    # Cherry angiomas  # Seborrheic keratoses  - We discussed the benign nature of the skin lesions. No treatment required. Continued observation recommended. Follow up with any concerns.      Follow-up:  6 months for follow up full body skin exam, as needed for new or changing lesions or new concerns    All risks, benefits and alternatives were discussed with patient.  Patient is in agreement and understands the assessment and plan.  All questions were answered.  Micki Quinones PA-C  [facility]    ____________________________________________    CC: Skin Check (FBSE - no areas of concern)    HPI:  Mr. Dimitris Carroll is a(n) 55 year old male who presents today as a new patient for a full body skin cancer screening. The patient has a history of malignant melanoma. Today, the patient reports a rough spots on the left 3rd toe. This has been present for years and has grown over time. No other specific cutaneous concerns. The patient reports trying to be diligent with  photoprotection.      Physical Exam:  Vitals: There were no vitals taken for this visit.  LYMPH: No cervical or axillary lymphadenopathy.   SKIN: Total skin excluding the genitalia areas was performed. The exam included the scalp, face, neck, bilateral arms, chest, back, abdomen, bilateral legs, digits, mons pubis, buttocks, and nails.   - Tolbert II.  - The right upper paraspinal back demonstrates a 0.3 x 0.3 cm brown macule with central blue hue on dermoscopy.   - The right superior upper medial back demonstrates a well healed scar with no nodularity, repigmentation, or pain to palpation.   - The left 3rd toe demonstrates a keratotic papule.   - Multiple tan/brown macules and papules scattered throughout exam, consistent with benign nevi. No concerning features on dermoscopy.   - Scattered tan, homogenous macules scattered on sun exposed skin, consistent with solar lentigines.   - Scattered waxy, stuck on appearing papules and patches, consistent with seborrheic keratoses.  - Several 1-2 mm red dome shaped symmetric papules, consistent with cherry angiomas.     - No other lesions of concern on areas examined.     Medications:  Current Outpatient Medications   Medication Sig Dispense Refill    atovaquone-proguanil (MALARONE) 250-100 MG tablet 1 tablet (atovaquone 250 mg/proguanil 100 mg per tablet) once daily; start 1 to 2 days prior to entering a malaria-endemic area, continue throughout the stay and for 7 days after returning (Patient not taking: Reported on 3/24/2025) 24 tablet 0    azithromycin (ZITHROMAX) 500 MG tablet Take 1 a day for 3 days at onset of travelers diarrhea (Patient not taking: Reported on 3/24/2025) 6 tablet 0    diclofenac (VOLTAREN) 1 % topical gel 1 gram to affected areas on left foot 2-3 times daily as needed for pain. (Patient not taking: Reported on 3/24/2025) 100 g 2    propafenone (RYTHMOL) 300 MG tablet Take 2 tablets (600 mg) by mouth once as needed For recurrence of atrial  fibrillation. One dose per 24 hours as needed (Patient not taking: Reported on 3/24/2025) 20 tablet 3     No current facility-administered medications for this visit.      Past Medical History:   Patient Active Problem List   Diagnosis    MILAGROS (obstructive sleep apnea)- moderate (AHI 25)    Chronic insomnia    Atrial fibrillation (H)     Past Medical History:   Diagnosis Date    Allergic state     Atrial fibrillation (H)     Chest pain 4/18/2019    Sleep apnea        CC Doug Figueredo MD  [address] [place] [address] on close of this encounter.

## 2025-06-30 NOTE — NURSING NOTE
Dermatology Rooming Note    Dimitris Carroll's goals for this visit include:   Chief Complaint   Patient presents with    Skin Check     FBSE - no areas of concern     VALERIE Palomino

## 2025-06-30 NOTE — LETTER
6/30/2025       RE: Dimitris Carroll  [address]  [facility] [address]     Dear Colleague,    Thank you for referring your patient, Dimitris Carroll, to the [facility] [place] at [facility]. Please see a copy of my visit note below.    [facility] Dermatology Note  Encounter Date: Jun 30, 2025  Office Visit     Reviewed patient's past medical history and pertinent chart review prior to patient's visit today.     Dermatology Problem List:  # NUB, right upper paraspinal back, shave biopsy 6/30/2025   # NUB, left 3rd toe, punch biopsy 6/30/2025     # Hx melanoma  - left superior medial upper back, breslow depth: 0.4 mm, s/p excision 12/19/2024 with [facility]     No known family history of skin cancer.    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 Shave biopsy today.    Procedure Note: Biopsy by shave technique  The risks and benefits of the procedure were described to the patient. These include but are not limited to bleeding, infection, scar, incomplete removal, and non-diagnostic biopsy. Verbal informed consent was obtained. The above site(s) was cleansed with an alcohol pad and injected with 1% lidocaine with epinephrine. Once anesthesia was obtained, a biopsy(ies) was performed with Gilette blade. The tissue(s) was placed in a labeled container(s) with formalin and sent to pathology. Hemostasis was achieved with aluminum chloride. Vaseline and a bandage were applied to the wound(s). The patient tolerated the procedure well and was given post biopsy care instructions.    # Neoplasm of uncertain behavior:  left 3rd toe  DDx includes corn vs wart vs SCC. Punch biopsy today.    Procedure Note: Biopsy  by punch technique  The risks and benefits of the procedure were described to the patient. These include but are not limited to bleeding, infection, scar, incomplete removal, and non-diagnostic biopsy. Verbal informed consent was obtained. The left 3rd toe was cleansed with an alcohol pad and injected with lidocaine with epinephrine. Once anesthesia was obtained, a 5 mm punch biopsy was performed. The tissue was placed in a labeled container with formalin and sent to pathology. Gel foam placed at the site. Vaseline and a bandage were applied to the wound. The patient tolerated the procedure well and was given post biopsy care instructions.     # Personal history of malignant melanoma  # Multiple nevi, trunk and extremities  # Solar lentigines  - No signs of recurrence. Continued observation recommended.   - Nevi demonstrate no concerning features on dermoscopy. We discussed the importance of self exams at home.   - ABCDEs: Counseled ABCDEs of melanoma: Asymmetry, Border (irregularity), Color (not uniform, changes in color), Diameter (greater than 6 mm which is about the size of a pencil eraser), and Evolving (any changes in preexisting moles).  - Sun protection: Counseled SPF 30+ sunscreen, UPF clothing, sun avoidance, tanning bed avoidance.    # Cherry angiomas  # Seborrheic keratoses  - We discussed the benign nature of the skin lesions. No treatment required. Continued observation recommended. Follow up with any concerns.      Follow-up:  6 months for follow up full body skin exam, as needed for new or changing lesions or new concerns    All risks, benefits and alternatives were discussed with patient.  Patient is in agreement and understands the assessment and plan.  All questions were answered.  Micki Quinones PA-C  [facility]    ____________________________________________    CC: Skin Check (FBSE - no areas of concern)    HPI:  Mr. Dimitris Carroll is a(n) 55 year old male who presents  today as a new patient for a full body skin cancer screening. The patient has a history of malignant melanoma. Today, the patient reports a rough spots on the left 3rd toe. This has been present for years and has grown over time. No other specific cutaneous concerns. The patient reports trying to be diligent with photoprotection.      Physical Exam:  Vitals: There were no vitals taken for this visit.  LYMPH: No cervical or axillary lymphadenopathy.   SKIN: Total skin excluding the genitalia areas was performed. The exam included the scalp, face, neck, bilateral arms, chest, back, abdomen, bilateral legs, digits, mons pubis, buttocks, and nails.   - Tolbert II.  - The right upper paraspinal back demonstrates a 0.3 x 0.3 cm brown macule with central blue hue on dermoscopy.   - The right superior upper medial back demonstrates a well healed scar with no nodularity, repigmentation, or pain to palpation.   - The left 3rd toe demonstrates a keratotic papule.   - Multiple tan/brown macules and papules scattered throughout exam, consistent with benign nevi. No concerning features on dermoscopy.   - Scattered tan, homogenous macules scattered on sun exposed skin, consistent with solar lentigines.   - Scattered waxy, stuck on appearing papules and patches, consistent with seborrheic keratoses.  - Several 1-2 mm red dome shaped symmetric papules, consistent with cherry angiomas.     - No other lesions of concern on areas examined.     Medications:  Current Outpatient Medications   Medication Sig Dispense Refill     atovaquone-proguanil (MALARONE) 250-100 MG tablet 1 tablet (atovaquone 250 mg/proguanil 100 mg per tablet) once daily; start 1 to 2 days prior to entering a malaria-endemic area, continue throughout the stay and for 7 days after returning (Patient not taking: Reported on 3/24/2025) 24 tablet 0     azithromycin (ZITHROMAX) 500 MG tablet Take 1 a day for 3 days at onset of travelers diarrhea (Patient not taking:  Reported on 3/24/2025) 6 tablet 0     diclofenac (VOLTAREN) 1 % topical gel 1 gram to affected areas on left foot 2-3 times daily as needed for pain. (Patient not taking: Reported on 3/24/2025) 100 g 2     propafenone (RYTHMOL) 300 MG tablet Take 2 tablets (600 mg) by mouth once as needed For recurrence of atrial fibrillation. One dose per 24 hours as needed (Patient not taking: Reported on 3/24/2025) 20 tablet 3     No current facility-administered medications for this visit.      Past Medical History:   Patient Active Problem List   Diagnosis     MILAGROS (obstructive sleep apnea)- moderate (AHI 25)     Chronic insomnia     Atrial fibrillation (H)     Past Medical History:   Diagnosis Date     Allergic state      Atrial fibrillation (H)      Chest pain 4/18/2019     Sleep apnea        CC Doug Figueredo MD  [address] [place] [phone] on close of this encounter.    Lidocaine-epinephrine 1-1:819144 % injection   0.8mL once for one use, starting 6/30/2025 ending 6/30/2025,  2mL disp, R-0, injection  Injected by VALERIE Palomino      Again, thank you for allowing me to participate in the care of your patient.      Sincerely,    Micki Quinones PA-C

## 2025-06-30 NOTE — PROGRESS NOTES
Lidocaine-epinephrine 1-1:373093 % injection   0.8mL once for one use, starting 6/30/2025 ending 6/30/2025,  2mL disp, R-0, injection  Injected by Lyndsey Castillo [place]

## 2025-07-01 ENCOUNTER — TELEPHONE (OUTPATIENT)
Dept: DERMATOLOGY | Facility: CLINIC | Age: 55
End: 2025-07-01
Payer: COMMERCIAL

## 2025-07-01 LAB
PATH REPORT.COMMENTS IMP SPEC: NORMAL
PATH REPORT.FINAL DX SPEC: NORMAL
PATH REPORT.GROSS SPEC: NORMAL
PATH REPORT.MICROSCOPIC SPEC OTHER STN: NORMAL
PATH REPORT.RELEVANT HX SPEC: NORMAL

## 2025-07-01 NOTE — TELEPHONE ENCOUNTER
7/1 Patient confirmed scheduled appointment:  Date: 2/24/26  Time: 7am  Visit type: Return Dermatology  Provider: Joni  Location: [facility]  Testing/imaging: na  Additional notes: na

## 2025-07-03 ENCOUNTER — RESULTS FOLLOW-UP (OUTPATIENT)
Dept: FAMILY MEDICINE | Facility: CLINIC | Age: 55
End: 2025-07-03

## 2025-08-07 ENCOUNTER — TELEPHONE (OUTPATIENT)
Dept: CARDIOLOGY | Facility: CLINIC | Age: 55
End: 2025-08-07
Payer: COMMERCIAL

## (undated) RX ORDER — FENTANYL CITRATE 50 UG/ML
INJECTION, SOLUTION INTRAMUSCULAR; INTRAVENOUS
Status: DISPENSED
Start: 2017-11-20

## (undated) RX ORDER — FENTANYL CITRATE 50 UG/ML
INJECTION, SOLUTION INTRAMUSCULAR; INTRAVENOUS
Status: DISPENSED
Start: 2022-08-10

## (undated) RX ORDER — REGADENOSON 0.08 MG/ML
INJECTION, SOLUTION INTRAVENOUS
Status: DISPENSED
Start: 2019-04-18